# Patient Record
Sex: MALE | Race: BLACK OR AFRICAN AMERICAN | NOT HISPANIC OR LATINO | Employment: OTHER | URBAN - METROPOLITAN AREA
[De-identification: names, ages, dates, MRNs, and addresses within clinical notes are randomized per-mention and may not be internally consistent; named-entity substitution may affect disease eponyms.]

---

## 2017-01-06 DIAGNOSIS — I10 ESSENTIAL HYPERTENSION: ICD-10-CM

## 2017-01-06 RX ORDER — LISINOPRIL AND HYDROCHLOROTHIAZIDE 12.5; 2 MG/1; MG/1
TABLET ORAL
Qty: 90 TABLET | Refills: 11 | Status: SHIPPED | OUTPATIENT
Start: 2017-01-06 | End: 2018-01-10 | Stop reason: SDUPTHER

## 2017-03-16 ENCOUNTER — OFFICE VISIT (OUTPATIENT)
Dept: INTERNAL MEDICINE | Facility: CLINIC | Age: 62
End: 2017-03-16
Payer: OTHER GOVERNMENT

## 2017-03-16 VITALS
OXYGEN SATURATION: 99 % | HEIGHT: 66 IN | SYSTOLIC BLOOD PRESSURE: 134 MMHG | WEIGHT: 152.56 LBS | HEART RATE: 72 BPM | DIASTOLIC BLOOD PRESSURE: 86 MMHG | BODY MASS INDEX: 24.52 KG/M2

## 2017-03-16 DIAGNOSIS — Z00.00 ROUTINE GENERAL MEDICAL EXAMINATION AT A HEALTH CARE FACILITY: Primary | ICD-10-CM

## 2017-03-16 PROCEDURE — 3079F DIAST BP 80-89 MM HG: CPT | Mod: S$GLB,,, | Performed by: INTERNAL MEDICINE

## 2017-03-16 PROCEDURE — 3075F SYST BP GE 130 - 139MM HG: CPT | Mod: S$GLB,,, | Performed by: INTERNAL MEDICINE

## 2017-03-16 PROCEDURE — 99999 PR PBB SHADOW E&M-EST. PATIENT-LVL III: CPT | Mod: PBBFAC,,, | Performed by: INTERNAL MEDICINE

## 2017-03-16 PROCEDURE — 99396 PREV VISIT EST AGE 40-64: CPT | Mod: S$GLB,,, | Performed by: INTERNAL MEDICINE

## 2017-03-16 NOTE — PROGRESS NOTES
Subjective:       Patient ID: Jeremy Martinez is a 62 y.o. male.    Chief Complaint: Annual Exam    HPI Comments: Here for yrly.    Still R shoulder arthritis, occ flares. Exercises from PT help.    No new other symptoms.    Review of Systems   Constitutional: Negative for appetite change and unexpected weight change.   Respiratory: Negative for chest tightness and shortness of breath.    Cardiovascular: Negative for chest pain.   Gastrointestinal: Negative for abdominal pain.   Genitourinary: Negative for difficulty urinating, scrotal swelling and testicular pain.   Musculoskeletal: Positive for arthralgias (R shoulder). Negative for joint swelling.   Skin:        No lesions       Objective:      Physical Exam   Constitutional: He is oriented to person, place, and time. He appears well-developed and well-nourished. No distress.   HENT:   Head: Normocephalic and atraumatic.   Eyes: No scleral icterus.   Neck: Normal range of motion. No thyromegaly present.   Cardiovascular: Normal rate, regular rhythm and normal heart sounds.  Exam reveals no gallop and no friction rub.    No murmur heard.  Pulmonary/Chest: Effort normal and breath sounds normal. No respiratory distress. He has no wheezes. He has no rales.   Abdominal: Soft. Bowel sounds are normal. He exhibits no distension and no mass. There is no tenderness. There is no rebound and no guarding.   Musculoskeletal: Normal range of motion. He exhibits no edema.   R shoulder crepituis and some focal tenderness to deep palpation at AC jt.  No rotator cuff weakness and FAROM     Lymphadenopathy:     He has no cervical adenopathy.   Neurological: He is alert and oriented to person, place, and time.   Skin: No lesion and no rash noted.   Psychiatric: He has a normal mood and affect. Thought content normal.       Assessment:       1. Routine general medical examination at a health care facility        Plan:       Jeremy ARORA was seen today for annual exam.    Diagnoses and  all orders for this visit:    Routine general medical examination at a health care facility  -     CBC auto differential; Future  -     Comprehensive metabolic panel; Future  -     Lipid panel; Future    HTN controlled    Health Maintenance       Date Due Completion Date    TETANUS VACCINE 3/1/1972 ---    Zoster Vaccine 3/1/2014 ---    Influenza Vaccine 8/1/2016 1/26/2016    Colonoscopy 6/4/2017 6/4/2007 (Done)    Override on 6/4/2007: Done    Lipid Panel 3/16/2022 3/16/2017        Next yr colo due.

## 2017-03-16 NOTE — MR AVS SNAPSHOT
[facility]  [address]  [facility] [address]  Phone: [phone]  Fax: [phone]                  Jeremy Martinez   3/16/2017 8:20 AM   Office Visit    Description:  Male : 1955   Provider:  Jose Abbott MD   Department:  [facility]           Reason for Visit     Annual Exam           Diagnoses this Visit        Comments    Routine general medical examination at a health care facility    -  Primary            To Do List           Future Appointments        Provider Department Dept Phone    3/16/2017 9:15 AM LAB, APPOINTMENT [facility] [phone]      Goals (5 Years of Data)     None      Follow-Up and Disposition     Return in about 1 year (around 3/16/2018).      [facility]     [facility] Care Line - 24/ Assistance  Registered nurses in the [facility] provide clinical advisement, health education, appointment booking, and other advisory services.  Call for this free service at [phone].             Medications           Message regarding Medications     Verify the changes and/or additions to your medication regime listed below are the same as discussed with your clinician today.  If any of these changes or additions are incorrect, please notify your healthcare provider.        STOP taking these medications     omeprazole (PRILOSEC) 20 MG capsule Take 20 mg by mouth daily as needed.    orphenadrine (NORFLEX) 100 mg tablet     psyllium seed, sugar, (METAMUCIL) Powd Take by mouth. 1 tblspf po in glass of water daily.    cetirizine (ZYRTEC) 10 MG tablet Take 1 tablet (10 mg total) by mouth once daily.           Verify that the below list of medications is an accurate representation of the medications you are currently taking.  If none reported, the list may be blank. If incorrect, please contact your healthcare provider. Carry this list with you in case of emergency.           Current  "Medications     chlorhexidine (PERIDEX) 0.12 % solution Use as directed 1 mL in the mouth or throat once daily.    lisinopril-hydrochlorothiazide (PRINZIDE,ZESTORETIC) 20-12.5 mg per tablet TAKE ONE TABLET BY MOUTH ONCE DAILY    VIAGRA 100 mg tablet TAKE ONE-HALF TABLET BY MOUTH ONCE DAILY AS NEEDED FOR ERECTILE DYSFUNCTION           Clinical Reference Information           Your Vitals Were     BP Pulse Height Weight SpO2 BMI    134/86 72 5' 6" (1.676 m) 69.2 kg (152 lb 8.9 oz) 99% 24.62 kg/m2      Blood Pressure          Most Recent Value    BP  134/86      Allergies as of 3/16/2017     Amoxicillin      Immunizations Administered on Date of Encounter - 3/16/2017     None      Orders Placed During Today's Visit     Future Labs/Procedures Expected by Expires    CBC auto differential  3/16/2017 6/14/2017    Comprehensive metabolic panel  3/16/2017 6/14/2017    Lipid panel  3/17/2018 (Approximate) 5/16/2018      [facility] Sign-Up     Activating your [facility] account is as easy as 1-2-3!     1) Visit [email or web address], select Sign Up Now, enter this activation code and your date of birth, then select Next.  [email or web address]  Expires: 4/30/2017  8:59 AM      2) Create a username and password to use when you visit [facility] in the future and select a security question in case you lose your password and select Next.    3) Enter your e-mail address and click Sign Up!    Additional Information  If you have questions, please e-mail [email or web address] or call [phone] to talk to our [facility] staff. Remember, [facility] is NOT to be used for urgent needs. For medical emergencies, dial 911.         Language Assistance Services     ATTENTION: Language assistance services are available, free of charge. Please call [phone].      ATENCIÓN: Si habla español, tiene a schaeffer disposición servicios gratuitos de asistencia lingüística. Llame al [phone].     CHÚ Ý: N?u b?n nói Ti?ng Vi?t, có các d?ch v? h? tr? ngôn ng? mi?n " phí dành cho b?n. G?i s? [phone].         [facility] complies with applicable Federal civil rights laws and does not discriminate on the basis of race, color, national origin, age, disability, or sex.

## 2017-06-21 DIAGNOSIS — Z12.11 SPECIAL SCREENING FOR MALIGNANT NEOPLASMS, COLON: Primary | ICD-10-CM

## 2017-06-21 RX ORDER — POLYETHYLENE GLYCOL 3350, SODIUM SULFATE ANHYDROUS, SODIUM BICARBONATE, SODIUM CHLORIDE, POTASSIUM CHLORIDE 236; 22.74; 6.74; 5.86; 2.97 G/4L; G/4L; G/4L; G/4L; G/4L
4 POWDER, FOR SOLUTION ORAL ONCE
Qty: 4000 ML | Refills: 0 | Status: SHIPPED | OUTPATIENT
Start: 2017-06-21 | End: 2017-06-21

## 2017-08-01 ENCOUNTER — HOSPITAL ENCOUNTER (OUTPATIENT)
Facility: HOSPITAL | Age: 62
Discharge: HOME | End: 2017-08-01
Attending: COLON & RECTAL SURGERY | Admitting: COLON & RECTAL SURGERY
Payer: OTHER GOVERNMENT

## 2017-08-01 ENCOUNTER — ANESTHESIA EVENT (OUTPATIENT)
Dept: ENDOSCOPY | Facility: HOSPITAL | Age: 62
End: 2017-08-01
Payer: OTHER GOVERNMENT

## 2017-08-01 ENCOUNTER — ANESTHESIA (OUTPATIENT)
Dept: ENDOSCOPY | Facility: HOSPITAL | Age: 62
End: 2017-08-01
Payer: OTHER GOVERNMENT

## 2017-08-01 VITALS
WEIGHT: 160 LBS | BODY MASS INDEX: 25.71 KG/M2 | TEMPERATURE: 98 F | SYSTOLIC BLOOD PRESSURE: 125 MMHG | OXYGEN SATURATION: 98 % | HEIGHT: 66 IN | RESPIRATION RATE: 14 BRPM | RESPIRATION RATE: 18 BRPM | HEART RATE: 60 BPM | DIASTOLIC BLOOD PRESSURE: 85 MMHG

## 2017-08-01 DIAGNOSIS — Z12.11 SCREENING FOR COLON CANCER: ICD-10-CM

## 2017-08-01 PROCEDURE — 63600175 PHARM REV CODE 636 W HCPCS: Performed by: NURSE ANESTHETIST, CERTIFIED REGISTERED

## 2017-08-01 PROCEDURE — D9220A PRA ANESTHESIA: Mod: 33,,, | Performed by: ANESTHESIOLOGY

## 2017-08-01 PROCEDURE — G0121 COLON CA SCRN NOT HI RSK IND: HCPCS | Mod: ,,, | Performed by: COLON & RECTAL SURGERY

## 2017-08-01 PROCEDURE — 37000009 HC ANESTHESIA EA ADD 15 MINS: Performed by: COLON & RECTAL SURGERY

## 2017-08-01 PROCEDURE — G0121 COLON CA SCRN NOT HI RSK IND: HCPCS | Performed by: COLON & RECTAL SURGERY

## 2017-08-01 PROCEDURE — 37000008 HC ANESTHESIA 1ST 15 MINUTES: Performed by: COLON & RECTAL SURGERY

## 2017-08-01 PROCEDURE — 25000003 PHARM REV CODE 250: Performed by: NURSE PRACTITIONER

## 2017-08-01 RX ORDER — PROPOFOL 10 MG/ML
VIAL (ML) INTRAVENOUS CONTINUOUS PRN
Status: DISCONTINUED | OUTPATIENT
Start: 2017-08-01 | End: 2017-08-01

## 2017-08-01 RX ORDER — PROPOFOL 10 MG/ML
VIAL (ML) INTRAVENOUS
Status: DISCONTINUED | OUTPATIENT
Start: 2017-08-01 | End: 2017-08-01

## 2017-08-01 RX ORDER — LIDOCAINE HCL/PF 100 MG/5ML
SYRINGE (ML) INTRAVENOUS
Status: DISCONTINUED | OUTPATIENT
Start: 2017-08-01 | End: 2017-08-01

## 2017-08-01 RX ORDER — SODIUM CHLORIDE 9 MG/ML
INJECTION, SOLUTION INTRAVENOUS CONTINUOUS
Status: DISCONTINUED | OUTPATIENT
Start: 2017-08-01 | End: 2017-08-01 | Stop reason: HOSPADM

## 2017-08-01 RX ADMIN — PROPOFOL 60 MG: 10 INJECTION, EMULSION INTRAVENOUS at 08:08

## 2017-08-01 RX ADMIN — PROPOFOL 200 MCG/KG/MIN: 10 INJECTION, EMULSION INTRAVENOUS at 08:08

## 2017-08-01 RX ADMIN — LIDOCAINE HYDROCHLORIDE 50 MG: 20 INJECTION, SOLUTION INTRAVENOUS at 08:08

## 2017-08-01 RX ADMIN — SODIUM CHLORIDE: 0.9 INJECTION, SOLUTION INTRAVENOUS at 07:08

## 2017-08-01 NOTE — PATIENT INSTRUCTIONS
Discharge Summary/Instructions after an Endoscopic Procedure  Patient Name: Jeremy Martinez  Patient MRN: [id number]  Patient YOB: 1955  Tuesday, August 01, 2017  Abilio Restrepo MD  RESTRICTIONS ON ACTIVITY:  - DO NOT drive a car, operate machinery, make legal/financial decisions, or   drink alcohol until the day after the procedure.    - The following day: return to full activity including work, except no heavy   lifting, straining or running for 3 days if polyps were removed.  - Diet: Eat and drink normally unless instructed otherwise.  TREATMENT FOR COMMON SIDE EFFECTS:  - Mild abdominal pain, bloating or excessive gas: rest, eat lightly and use   a heating pad.  - Sore Throat - treat with throat lozenges. Gargle with warm salt water.  SYMPTOMS TO WATCH FOR AND REPORT TO YOUR PHYSICIAN:  1. Severe abdominal pain or bloating.  2. Pain in chest.  3. Chills or fever occurring within 24 hours after a procedure.  4. A large amount of rectal bleeding, which would show as bright red,   maroon, or black stools. (A small amount of blood from the rectum is not   serious, especially if hemorrhoids are present.)  5. Because air was used during the procedure, expelling large amounts of air   from your rectum or belching is normal.  6. If a bowel prep was taken, you may not have a bowel movement for 1-3   days.  This is normal.  7. Go directly to the emergency room if you notice any of the following:   Chills and/or fever over 101 F   Persistent vomiting   Severe abdominal pain, other than gas cramps   Severe chest pain   Black, tarry stools   Any bleeding - exceeding one tablespoon  Your doctor recommends these additional instructions:  If any biopsies were performed, my office will call you in 5 to 6 business   days with any results.  You are being discharged to home.   You have a contact number available for emergencies.  The signs and symptoms   of potential delayed complications were discussed with you.  You may  return   to normal activities tomorrow.  Written discharge instructions were   provided to you.   Eat a high fiber diet for the rest of your life.   Continue your present medications.   Your physician has recommended a repeat colonoscopy in 10 years for   screening purposes.  For questions, problems or results please call your physician - Abilio Restrepo MD at Work:  [phone].  [facility], EMERGENCY ROOM PHONE NUMBER: [phone]  IF A COMPLICATION OR EMERGENCY SITUATION ARISES AND YOU ARE UNABLE TO REACH   YOUR PHYSICIAN - GO TO THE EMERGENCY ROOM.  Abilio Restrepo MD  8/1/2017 9:03:27 AM  This report has been verified and signed electronically.

## 2017-08-01 NOTE — TRANSFER OF CARE
"Anesthesia Transfer of Care Note    Patient: Jeremy Martinez    Procedure(s) Performed: Procedure(s) (LRB):  COLONOSCOPY (N/A)    Patient location: PACU    Transport from OR: Transported from OR on room air with adequate spontaneous ventilation    Post pain: adequate analgesia    Post assessment: no apparent anesthetic complications    Post vital signs: stable    Level of consciousness: awake    Nausea/Vomiting: no nausea/vomiting    Complications: none    Transfer of care protocol was followed      Last vitals:   Visit Vitals  /64 (BP Location: Left arm, Patient Position: Lying, BP Method: Automatic)   Pulse 73   Temp 36.8 °C (98.2 °F) (Oral)   Resp 16   Ht 5' 6" (1.676 m)   Wt 72.6 kg (160 lb)   SpO2 100%   BMI 25.82 kg/m²     "

## 2017-08-01 NOTE — ANESTHESIA PREPROCEDURE EVALUATION
08/01/2017  Jeremy Martinez is a 62 y.o., male.    Anesthesia Evaluation         Review of Systems  Social:  Former Smoker, Alcohol Use   Cardiovascular:   Hypertension hyperlipidemia    Hepatic/GI:   GERD        Physical Exam  General:  Well nourished    Airway/Jaw/Neck:  Airway Findings: Mouth Opening: Normal Tongue: Normal  General Airway Assessment: Adult  Mallampati: II      Dental:  Dental Findings: Upper Dentures        Mental Status:  Mental Status Findings:  Cooperative, Alert and Oriented         Anesthesia Plan  Type of Anesthesia, risks & benefits discussed:  Anesthesia Type:  general  Patient's Preference:   Intra-op Monitoring Plan: standard ASA monitors  Intra-op Monitoring Plan Comments:   Post Op Pain Control Plan:   Post Op Pain Control Plan Comments:   Induction:    Beta Blocker:  Patient is not currently on a Beta-Blocker (No further documentation required).       Informed Consent: Patient understands risks and agrees with Anesthesia plan.  Questions answered. Anesthesia consent signed with patient.  ASA Score: 2     Day of Surgery Review of History & Physical:    H&P update referred to the provider.         Ready For Surgery From Anesthesia Perspective.

## 2017-08-01 NOTE — ANESTHESIA POSTPROCEDURE EVALUATION
"Anesthesia Post Evaluation    Patient: Jeremy Martinez    Procedure(s) Performed: Procedure(s) (LRB):  COLONOSCOPY (N/A)    Final Anesthesia Type: general  Patient location during evaluation: GI PACU  Patient participation: Yes- Able to Participate  Level of consciousness: awake and alert  Post-procedure vital signs: reviewed and stable  Pain management: adequate  Airway patency: patent  PONV status at discharge: No PONV  Anesthetic complications: no      Cardiovascular status: blood pressure returned to baseline  Respiratory status: unassisted  Hydration status: euvolemic  Follow-up not needed.        Visit Vitals  /85 (BP Location: Left arm, Patient Position: Sitting, BP Method: Automatic)   Pulse 60   Temp 36.8 °C (98.2 °F) (Oral)   Resp 18   Ht 5' 6" (1.676 m)   Wt 72.6 kg (160 lb)   SpO2 98%   BMI 25.82 kg/m²       Pain/Mike Score: Pain Assessment Performed: Yes (8/1/2017  9:36 AM)  Presence of Pain: denies (8/1/2017  9:36 AM)  Mike Score: 9 (8/1/2017  9:06 AM)      "

## 2017-08-01 NOTE — H&P
Colonoscopy History and Physical      Procedure : Colonoscopy    Indications:  asymptomatic screening exam    Family Hx of CRC: none    Last Colonoscopy:  10    Hx of sedation problems: none  FHX of sedation problems: none    Past Medical History:   Diagnosis Date    Erectile dysfunction     GERD (gastroesophageal reflux disease) 2010    Hyperlipidemia 2008    Hypertension 2007       Past Surgical History:   Procedure Laterality Date    TOTAL KNEE ARTHROPLASTY      right       Review of patient's allergies indicates:   Allergen Reactions    Amoxicillin Rash       No current facility-administered medications on file prior to encounter.      Current Outpatient Prescriptions on File Prior to Encounter   Medication Sig Dispense Refill    lisinopril-hydrochlorothiazide (PRINZIDE,ZESTORETIC) 20-12.5 mg per tablet TAKE ONE TABLET BY MOUTH ONCE DAILY 90 tablet 11    chlorhexidine (PERIDEX) 0.12 % solution Use as directed 1 mL in the mouth or throat once daily.      VIAGRA 100 mg tablet TAKE ONE-HALF TABLET BY MOUTH ONCE DAILY AS NEEDED FOR ERECTILE DYSFUNCTION 12 tablet 11       Family History   Problem Relation Age of Onset    Diabetes Mother     Kidney disease Son      transplant    Cancer Neg Hx     Colon cancer Neg Hx     Colon polyps Neg Hx     Esophageal cancer Neg Hx     Stomach cancer Neg Hx     Rectal cancer Neg Hx     Ulcerative colitis Neg Hx     Crohn's disease Neg Hx        Social History     Social History    Marital status:      Spouse name: N/A    Number of children: N/A    Years of education: N/A     Occupational History    Not on file.     Social History Main Topics    Smoking status: Former Smoker     Years: 10.00     Quit date: 7/18/2000    Smokeless tobacco: Never Used    Alcohol use Yes      Comment: a few beers every days, shots on weekends    Drug use: No    Sexual activity: Not on file     Other Topics Concern    Not on file     Social History Narrative     , 5 kids. 4 grandkids. 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    Active at work. No formal exercise.       Review of Systems -   Respiratory ROS: negative  Cardiovascular ROS: negative  Gastrointestinal ROS: negative  Musculoskeletal ROS: negative  Neurological ROS: negative    Physical Exam:  General: no distress  Head: normocephalic  Oropharynx clear, Mallampati 1  Lungs:  normal respiratory effort  Heart: regular rate  Abdomen: soft,  Non-tender  Extremities: warm and well perfused  Neuro awake and alert    ASA: II    Patient cleared for Anesthesia:  MAC    Anesthesia/Surgery risks, benefits, and alternative options discussed and understood by patient/family.

## 2017-08-08 ENCOUNTER — TELEPHONE (OUTPATIENT)
Dept: ENDOSCOPY | Facility: HOSPITAL | Age: 62
End: 2017-08-08

## 2018-01-10 ENCOUNTER — TELEPHONE (OUTPATIENT)
Dept: INTERNAL MEDICINE | Facility: CLINIC | Age: 63
End: 2018-01-10

## 2018-01-10 DIAGNOSIS — Z00.00 ROUTINE GENERAL MEDICAL EXAMINATION AT A HEALTH CARE FACILITY: Primary | ICD-10-CM

## 2018-01-10 DIAGNOSIS — I10 ESSENTIAL HYPERTENSION: ICD-10-CM

## 2018-01-10 RX ORDER — LISINOPRIL AND HYDROCHLOROTHIAZIDE 12.5; 2 MG/1; MG/1
1 TABLET ORAL DAILY
Qty: 90 TABLET | Refills: 3 | Status: SHIPPED | OUTPATIENT
Start: 2018-01-10 | End: 2019-01-07 | Stop reason: SDUPTHER

## 2018-01-10 NOTE — TELEPHONE ENCOUNTER
----- Message from Sara Maddox sent at 1/10/2018 10:08 AM CST -----  Contact: self/ [phone]  Doctor appointment and lab have been scheduled.  Please link lab orders to the lab appointment.  Date of doctor appointment:  3/16/18  Physical or EP:  phys  Date of lab appointment:    Comments:Patient need orders to go to [facility] in March.

## 2018-01-10 NOTE — TELEPHONE ENCOUNTER
"----- Message from Sara Maddox sent at 1/10/2018 10:03 AM CST -----  Contact: self/ [phone]  RX request - refill or new RX.  Is this a refill or new RX:    RX name and strength: lisinopril-hydrochlorothiazide (PRINZIDE,ZESTORETIC) 20-12.5 mg per tablet 90 tablet   Directions:   Is this a 30 day or 90 day RX:    Pharmacy name and phone # (DON'T enter "on file" or "in chart"): [facility] [address] [place] [address] (N), [place] - [address] [phone] (Phone)  [phone] (Fax)      Comments:        "

## 2018-03-09 LAB
ALBUMIN SERPL-MCNC: 4.4 G/DL (ref 3.6–5.1)
ALBUMIN/GLOB SERPL: 1.4 (CALC) (ref 1–2.5)
ALP SERPL-CCNC: 62 U/L (ref 40–115)
ALT SERPL-CCNC: 14 U/L (ref 9–46)
AST SERPL-CCNC: 17 U/L (ref 10–35)
BASOPHILS # BLD AUTO: 21 CELLS/UL (ref 0–200)
BASOPHILS NFR BLD AUTO: 0.4 %
BILIRUB SERPL-MCNC: 0.8 MG/DL (ref 0.2–1.2)
BUN SERPL-MCNC: 9 MG/DL (ref 7–25)
BUN/CREAT SERPL: ABNORMAL (CALC) (ref 6–22)
CALCIUM SERPL-MCNC: 9.2 MG/DL (ref 8.6–10.3)
CHLORIDE SERPL-SCNC: 95 MMOL/L (ref 98–110)
CHOLEST SERPL-MCNC: 217 MG/DL
CHOLEST/HDLC SERPL: 3.4 (CALC)
CO2 SERPL-SCNC: 26 MMOL/L (ref 20–31)
CREAT SERPL-MCNC: 0.96 MG/DL (ref 0.7–1.25)
EOSINOPHIL # BLD AUTO: 80 CELLS/UL (ref 15–500)
EOSINOPHIL NFR BLD AUTO: 1.5 %
ERYTHROCYTE [DISTWIDTH] IN BLOOD BY AUTOMATED COUNT: 13.9 % (ref 11–15)
GFR SERPL CREATININE-BSD FRML MDRD: 83 ML/MIN/1.73M2
GLOBULIN SER CALC-MCNC: 3.2 G/DL (CALC) (ref 1.9–3.7)
GLUCOSE SERPL-MCNC: 91 MG/DL (ref 65–99)
HCT VFR BLD AUTO: 39.2 % (ref 38.5–50)
HDLC SERPL-MCNC: 63 MG/DL
HGB BLD-MCNC: 12.4 G/DL (ref 13.2–17.1)
LDLC SERPL CALC-MCNC: 129 MG/DL (CALC)
LYMPHOCYTES # BLD AUTO: 2300 CELLS/UL (ref 850–3900)
LYMPHOCYTES NFR BLD AUTO: 43.4 %
MCH RBC QN AUTO: 24.4 PG (ref 27–33)
MCHC RBC AUTO-ENTMCNC: 31.6 G/DL (ref 32–36)
MCV RBC AUTO: 77 FL (ref 80–100)
MONOCYTES # BLD AUTO: 610 CELLS/UL (ref 200–950)
MONOCYTES NFR BLD AUTO: 11.5 %
NEUTROPHILS # BLD AUTO: 2290 CELLS/UL (ref 1500–7800)
NEUTROPHILS NFR BLD AUTO: 43.2 %
NONHDLC SERPL-MCNC: 154 MG/DL (CALC)
PLATELET # BLD AUTO: 295 THOUSAND/UL (ref 140–400)
PMV BLD REES-ECKER: 9.7 FL (ref 7.5–12.5)
POTASSIUM SERPL-SCNC: 4.3 MMOL/L (ref 3.5–5.3)
PROT SERPL-MCNC: 7.6 G/DL (ref 6.1–8.1)
RBC # BLD AUTO: 5.09 MILLION/UL (ref 4.2–5.8)
SODIUM SERPL-SCNC: 128 MMOL/L (ref 135–146)
TRIGL SERPL-MCNC: 142 MG/DL
WBC # BLD AUTO: 5.3 THOUSAND/UL (ref 3.8–10.8)

## 2018-03-16 ENCOUNTER — OFFICE VISIT (OUTPATIENT)
Dept: INTERNAL MEDICINE | Facility: CLINIC | Age: 63
End: 2018-03-16
Payer: OTHER GOVERNMENT

## 2018-03-16 VITALS
BODY MASS INDEX: 26.4 KG/M2 | HEART RATE: 75 BPM | DIASTOLIC BLOOD PRESSURE: 76 MMHG | OXYGEN SATURATION: 99 % | WEIGHT: 164.25 LBS | SYSTOLIC BLOOD PRESSURE: 130 MMHG | HEIGHT: 66 IN

## 2018-03-16 DIAGNOSIS — E78.5 HYPERLIPIDEMIA, UNSPECIFIED HYPERLIPIDEMIA TYPE: ICD-10-CM

## 2018-03-16 DIAGNOSIS — I10 ESSENTIAL HYPERTENSION: ICD-10-CM

## 2018-03-16 DIAGNOSIS — Z00.00 ROUTINE GENERAL MEDICAL EXAMINATION AT A HEALTH CARE FACILITY: Primary | ICD-10-CM

## 2018-03-16 PROCEDURE — 99999 PR PBB SHADOW E&M-EST. PATIENT-LVL III: CPT | Mod: PBBFAC,,, | Performed by: INTERNAL MEDICINE

## 2018-03-16 PROCEDURE — 99396 PREV VISIT EST AGE 40-64: CPT | Mod: S$GLB,,, | Performed by: INTERNAL MEDICINE

## 2018-03-16 RX ORDER — ATORVASTATIN CALCIUM 10 MG/1
10 TABLET, FILM COATED ORAL DAILY
Qty: 90 TABLET | Refills: 3 | Status: SHIPPED | OUTPATIENT
Start: 2018-03-16 | End: 2019-03-18

## 2018-03-16 NOTE — PROGRESS NOTES
Subjective:       Patient ID: Jeremy Martinez is a 63 y.o. male.    Chief Complaint: Annual Exam    Here for annual exam.    L knee stiff and a bit swollen.    No new other issues.      Review of Systems   Constitutional: Negative for appetite change and unexpected weight change.   Respiratory: Negative for chest tightness and shortness of breath.    Cardiovascular: Negative for chest pain.   Gastrointestinal: Negative for abdominal pain.   Genitourinary: Negative for difficulty urinating, scrotal swelling and testicular pain.   Musculoskeletal: Positive for arthralgias (knees). Negative for joint swelling.   Skin:        No lesions       Objective:      Physical Exam   Constitutional: He is oriented to person, place, and time. He appears well-developed and well-nourished. No distress.   HENT:   Head: Normocephalic and atraumatic.   Eyes: No scleral icterus.   Neck: Normal range of motion. No thyromegaly present.   Cardiovascular: Normal rate, regular rhythm and normal heart sounds.  Exam reveals no gallop and no friction rub.    No murmur heard.  Pulmonary/Chest: Effort normal and breath sounds normal. No respiratory distress. He has no wheezes. He has no rales.   Abdominal: Soft. Bowel sounds are normal. He exhibits no distension and no mass. There is no tenderness. There is no rebound and no guarding.   Musculoskeletal: Normal range of motion. He exhibits no edema.   L knee patella somewhat hypermobile, maintained rom L knee. Knee not warm or swollen.     Lymphadenopathy:     He has no cervical adenopathy.   Neurological: He is alert and oriented to person, place, and time.   Skin: No lesion and no rash noted.   Psychiatric: He has a normal mood and affect. Thought content normal.       Assessment:       1. Essential hypertension    2. Hyperlipidemia, unspecified hyperlipidemia type        Plan:         Jeremy ARORA was seen today for annual exam.    Diagnoses and all orders for this visit:    Essential  hypertension  controlled    Hyperlipidemia, unspecified hyperlipidemia type  -     atorvastatin (LIPITOR) 10 MG tablet; Take 1 tablet (10 mg total) by mouth once daily.    Lengthy discussion re importance of cutting back etoh.    Health Maintenance       Date Due Completion Date    TETANUS VACCINE 03/01/1972 ---    Zoster Vaccine 03/01/2014 ---    Influenza Vaccine 08/01/2017 1/26/2016    Lipid Panel 03/08/2023 3/8/2018    Colonoscopy 08/01/2027 8/1/2017    Override on 6/4/2007: Done          Follow-up in about 1 year (around 3/16/2019).            The 10-year ASCVD risk score (Wading Rivernova ROSS Jr., et al., 2013) is: 15.3%    Values used to calculate the score:      Age: 63 years      Sex: Male      Is Non- : Yes      Diabetic: No      Tobacco smoker: No      Systolic Blood Pressure: 130 mmHg      Is BP treated: Yes      HDL Cholesterol: 63 mg/dL      Total Cholesterol: 217 mg/dL

## 2018-12-27 ENCOUNTER — TELEPHONE (OUTPATIENT)
Dept: INTERNAL MEDICINE | Facility: CLINIC | Age: 63
End: 2018-12-27

## 2018-12-27 DIAGNOSIS — Z00.00 ROUTINE GENERAL MEDICAL EXAMINATION AT A HEALTH CARE FACILITY: Primary | ICD-10-CM

## 2018-12-27 NOTE — TELEPHONE ENCOUNTER
Hi, please contact the patient to assist in scheduling    Orders Placed This Encounter    CBC auto differential    Comprehensive metabolic panel    Lipid panel    Hemoglobin A1c       Thank you, Jose Abbott

## 2018-12-27 NOTE — TELEPHONE ENCOUNTER
----- Message from Dionna Milian sent at 12/27/2018 10:10 AM CST -----  Contact: Pt Mobile [phone]   Doctor appointment and lab have been scheduled.  Please link lab orders to the lab appointment.  Date of doctor appointment:  03/18/2019  Physical or EP:  Physical   Date of lab appointment:    Comments: Patient would like to put in an order for his labs to be done at [facility] please.

## 2019-01-07 DIAGNOSIS — I10 ESSENTIAL HYPERTENSION: ICD-10-CM

## 2019-01-07 RX ORDER — LISINOPRIL AND HYDROCHLOROTHIAZIDE 12.5; 2 MG/1; MG/1
TABLET ORAL
Qty: 90 TABLET | Refills: 3 | Status: SHIPPED | OUTPATIENT
Start: 2019-01-07 | End: 2019-01-10 | Stop reason: SDUPTHER

## 2019-01-10 DIAGNOSIS — I10 ESSENTIAL HYPERTENSION: ICD-10-CM

## 2019-01-10 RX ORDER — LISINOPRIL AND HYDROCHLOROTHIAZIDE 12.5; 2 MG/1; MG/1
1 TABLET ORAL DAILY
Qty: 90 TABLET | Refills: 3 | Status: SHIPPED | OUTPATIENT
Start: 2019-01-10 | End: 2020-01-08

## 2019-01-10 NOTE — TELEPHONE ENCOUNTER
----- Message from Matilda Nicole sent at 1/10/2019  1:41 PM CST -----  Contact: Jeremy Martinez [phone]  The patient is requesting a refill.    lisinopril-hydrochlorothiazide (PRINZIDE,ZESTORETIC) 20-12.5 mg per tablet    Pharmacy:[address] (), [place] - [address] [phone] (Phone)  [phone] (Fax)

## 2019-01-15 ENCOUNTER — OFFICE VISIT (OUTPATIENT)
Dept: INTERNAL MEDICINE | Facility: CLINIC | Age: 64
End: 2019-01-15
Payer: OTHER GOVERNMENT

## 2019-01-15 VITALS
SYSTOLIC BLOOD PRESSURE: 138 MMHG | HEART RATE: 76 BPM | BODY MASS INDEX: 26.65 KG/M2 | WEIGHT: 165.81 LBS | HEIGHT: 66 IN | OXYGEN SATURATION: 99 % | DIASTOLIC BLOOD PRESSURE: 80 MMHG

## 2019-01-15 DIAGNOSIS — R21 RASH: Primary | ICD-10-CM

## 2019-01-15 DIAGNOSIS — B35.9 TINEA: ICD-10-CM

## 2019-01-15 PROCEDURE — 99999 PR PBB SHADOW E&M-EST. PATIENT-LVL IV: ICD-10-PCS | Mod: PBBFAC,,, | Performed by: PHYSICIAN ASSISTANT

## 2019-01-15 PROCEDURE — 99213 PR OFFICE/OUTPT VISIT, EST, LEVL III, 20-29 MIN: ICD-10-PCS | Mod: S$GLB,,, | Performed by: PHYSICIAN ASSISTANT

## 2019-01-15 PROCEDURE — 99999 PR PBB SHADOW E&M-EST. PATIENT-LVL IV: CPT | Mod: PBBFAC,,, | Performed by: PHYSICIAN ASSISTANT

## 2019-01-15 PROCEDURE — 99213 OFFICE O/P EST LOW 20 MIN: CPT | Mod: S$GLB,,, | Performed by: PHYSICIAN ASSISTANT

## 2019-01-15 RX ORDER — HYDROXYZINE HYDROCHLORIDE 25 MG/1
25 TABLET, FILM COATED ORAL 3 TIMES DAILY
Qty: 30 TABLET | Refills: 0 | Status: SHIPPED | OUTPATIENT
Start: 2019-01-15 | End: 2019-03-18

## 2019-01-15 RX ORDER — KETOCONAZOLE 20 MG/G
CREAM TOPICAL 2 TIMES DAILY
Qty: 30 G | Refills: 0 | Status: SHIPPED | OUTPATIENT
Start: 2019-01-15 | End: 2019-01-23 | Stop reason: SDUPTHER

## 2019-01-15 NOTE — PROGRESS NOTES
"Subjective:       Patient ID: Jeremy Martinez is a 63 y.o. male.    Chief Complaint: Rash    HPI     C/o rash under both arms, onset about 1 month ago.   Using alcohol rub, apple cider water without significant relief. He notes he does sweat heavily in this region. No recent change of deodorants. States his wife often changes laundry detergent. No rash on other parts of body. No new medications, No fevers, arthralgias or drainage.     Review of patient's allergies indicates:   Allergen Reactions    Amoxicillin Hives and Rash     Social History     Tobacco Use    Smoking status: Former Smoker     Years: 10.00     Last attempt to quit: 2000     Years since quittin.5    Smokeless tobacco: Never Used   Substance Use Topics    Alcohol use: Yes     Comment: a few beers daily, some days more. not drinking 6 pack.    Drug use: No     Past Medical History:   Diagnosis Date    Erectile dysfunction     GERD (gastroesophageal reflux disease)     Hyperlipidemia 2008    Hypertension 2007           Review of Systems   Constitutional: Negative for chills, diaphoresis, fatigue, fever and unexpected weight change.   Eyes: Negative for visual disturbance.   Respiratory: Negative for cough and shortness of breath.    Cardiovascular: Negative for chest pain and leg swelling.   Gastrointestinal: Negative for abdominal pain, nausea and vomiting.   Musculoskeletal: Negative for arthralgias.   Skin: Positive for rash.   Neurological: Negative for weakness and headaches.   Hematological: Negative for adenopathy.       Objective: /80   Pulse 76   Ht 5' 6" (1.676 m)   Wt 75.2 kg (165 lb 12.6 oz)   SpO2 99%   BMI 26.76 kg/m²         Physical Exam   Constitutional: He is oriented to person, place, and time. He appears well-developed and well-nourished. No distress.   HENT:   Head: Normocephalic and atraumatic.   Mouth/Throat: Oropharynx is clear and moist.   Cardiovascular: Normal rate and regular rhythm. Exam " reveals no friction rub.   No murmur heard.  Pulmonary/Chest: Effort normal and breath sounds normal. He has no wheezes. He has no rales.   Abdominal: Soft. Bowel sounds are normal. There is no tenderness.   Lymphadenopathy:     He has no cervical adenopathy.   Neurological: He is alert and oriented to person, place, and time.   Skin: Skin is warm and dry. Capillary refill takes less than 2 seconds. Rash noted.   See photos below  Large patch to bilateral axilla, well demarcated, mild eyrthematous border with central clearing.    Psychiatric: He has a normal mood and affect.   Vitals reviewed.                    Assessment:       1. Rash    2. Tinea        Plan:         Jeremy ARORA was seen today for rash.    Diagnoses and all orders for this visit:    Rash  -     hydrOXYzine HCl (ATARAX) 25 MG tablet; Take 1 tablet (25 mg total) by mouth 3 (three) times daily.  -     ketoconazole (NIZORAL) 2 % cream; Apply topically 2 (two) times daily. For 2 weeks.    Tinea  -     hydrOXYzine HCl (ATARAX) 25 MG tablet; Take 1 tablet (25 mg total) by mouth 3 (three) times daily.  -     ketoconazole (NIZORAL) 2 % cream; Apply topically 2 (two) times daily. For 2 weeks.      Suspect fungal rash of axilla  Trial of topical antifungal  Keep area clean and dry  RTC if symptoms worsen or fail to improve  Consider Derm referral if no resolution  Keep PCP follow up.     Future Appointments   Date Time Provider Department Center   3/18/2019  8:00 AM Jose Abbott MD [facility]

## 2019-01-15 NOTE — PATIENT INSTRUCTIONS
Fungal Skin Infection (Tinea) (Child)  A fungal infection happens when too much fungus grows on or in the body. Fungus normally lives on the skin in small amounts and does not cause harm. But when too much grows on the skin, it causes an infection. This is also known as tinea. Fungal skin infections are common in children and usually not serious.  The infection often starts as a small red area the size of a pea. The skin may turn dry and flaky. The area may itch. As the fungus grows, it spreads out in a red [place]. Because of how it looks, fungal skin infection is often called ringworm, but it is not caused by a worm. Fungal skin infections can occur on many parts of the body. They can grow on the head, chest, arms, or legs. They can occur on the buttocks. On the feet, fungal infection is known as athletes foot. It causes itchy, sometimes painful sores between the toes and on the bottom or sides of the feet.  In babies and children, a fungal skin infection is often caused by contact with a person or animal that is infected. A child who has been on antibiotics can get the infection more easily. A child with a weakened immune system can also get fungal infections more easily. Children who have diabetes or are obese also are more likely to get a fungal infection.   In most cases, treatment is done with antifungal cream or ointment. If the infection is on your childs scalp, oral medicine may be given. In some cases, a tiny piece of the skin may be taken. This is so it can be tested in a lab.  Home care  Follow all instructions when using antifungal cream or ointment on your child. For diaper areas, the healthcare provider may advise using cornstarch powder to keep the skin dry or petroleum jelly to provide a barrier. Dont use talcum powder. It can harm the lungs.  General care  · Expose the affected skin to the air so that it dries completely. Don't use a hair dryer on the skin. Carefully dry the feet and between  the toes after bathing.  · Dress your child in loose-fitting cotton clothing.  · Make sure your child does not scratch the affected area. This can delay healing and may spread the infection. It can also cause a bacterial infection. You may need to use scratch mittens that cover your childs hands.  · Keep your childs skin clean, but dont wash the skin too much. This can irritate the skin.  For children in diapers:  · Keep your childs skin dry by changing wet or soiled diapers right away.  · Use cold cream on a cotton ball to wipe urine off the skin. Use warm water and a mild soap to clean stool off the skin.  · Use mineral oil on a cotton ball to gently remove soiled ointment. Keep clean ointment on the skin. Apply more ointment after each diaper change.  · Use superabsorbent disposable diapers to help keep your child's skin dry. If you use cloth diapers, use overwraps that breathe. Don't use rubber pants over the diaper.  Follow-up care  Follow up with your childs healthcare provider, or as advised.  Special note to parents  Wash your hands well with soap and warm water before and after caring for your child. This is to help avoid spreading the infection.  When to seek medical advice  Call your child's healthcare provider right away if any of these occur:  · Fever of 100.4°F (38°C) or higher, or as directed by your child's healthcare provider  · Redness or swelling that gets worse  · Pain that gets worse  · Foul-smelling fluid leaking from the skin  Date Last Reviewed: 1/1/2017 © 2000-2017 [employer]. [address] [place] [address], [place] [address]. All rights reserved. This information is not intended as a substitute for professional medical care. Always follow your healthcare professional's instructions.

## 2019-01-23 DIAGNOSIS — R21 RASH: ICD-10-CM

## 2019-01-23 DIAGNOSIS — B35.9 TINEA: ICD-10-CM

## 2019-01-23 RX ORDER — KETOCONAZOLE 20 MG/G
CREAM TOPICAL 2 TIMES DAILY
Qty: 30 G | Refills: 11 | Status: SHIPPED | OUTPATIENT
Start: 2019-01-23 | End: 2020-01-13

## 2019-01-23 NOTE — TELEPHONE ENCOUNTER
----- Message from Sandi Antunez sent at 1/23/2019  8:39 AM CST -----  Contact: [phone]  RX request - refill or new RX.  Is this a refill or new RX:  Refill   RX name and strength: ketoconazole (NIZORAL) 2 % cream    Local pharmacy or mail order pharmacy:  [address] ([address]), [place] - [address]     [phone] (Phone)  [phone] (Fax)      Comments:  Please advise ,thanks

## 2019-03-18 ENCOUNTER — OFFICE VISIT (OUTPATIENT)
Dept: INTERNAL MEDICINE | Facility: CLINIC | Age: 64
End: 2019-03-18
Payer: OTHER GOVERNMENT

## 2019-03-18 VITALS
HEIGHT: 66 IN | SYSTOLIC BLOOD PRESSURE: 118 MMHG | HEART RATE: 56 BPM | OXYGEN SATURATION: 99 % | BODY MASS INDEX: 26.93 KG/M2 | DIASTOLIC BLOOD PRESSURE: 66 MMHG | WEIGHT: 167.56 LBS

## 2019-03-18 DIAGNOSIS — E78.5 HYPERLIPIDEMIA, UNSPECIFIED HYPERLIPIDEMIA TYPE: ICD-10-CM

## 2019-03-18 DIAGNOSIS — B35.9 TINEA: ICD-10-CM

## 2019-03-18 DIAGNOSIS — Z00.00 ROUTINE GENERAL MEDICAL EXAMINATION AT A HEALTH CARE FACILITY: Primary | ICD-10-CM

## 2019-03-18 DIAGNOSIS — I10 ESSENTIAL HYPERTENSION: ICD-10-CM

## 2019-03-18 DIAGNOSIS — R21 RASH: ICD-10-CM

## 2019-03-18 PROCEDURE — 99999 PR PBB SHADOW E&M-EST. PATIENT-LVL III: ICD-10-PCS | Mod: PBBFAC,,, | Performed by: INTERNAL MEDICINE

## 2019-03-18 PROCEDURE — 99397 PR PREVENTIVE VISIT,EST,65 & OVER: ICD-10-PCS | Mod: S$GLB,,, | Performed by: INTERNAL MEDICINE

## 2019-03-18 PROCEDURE — 99397 PER PM REEVAL EST PAT 65+ YR: CPT | Mod: S$GLB,,, | Performed by: INTERNAL MEDICINE

## 2019-03-18 PROCEDURE — 99999 PR PBB SHADOW E&M-EST. PATIENT-LVL III: CPT | Mod: PBBFAC,,, | Performed by: INTERNAL MEDICINE

## 2019-03-18 NOTE — PROGRESS NOTES
Subjective:       Patient ID: Jeremy Martinez is a 64 y.o. male.    Chief Complaint: Annual Exam    Herd for checkup and Patient is here for followup for chronic conditions.    L ring finger knuckles stiffness and pains. Mild injury -- jammed it 6 weeks ago. No locking.    Underarm rash itching again but rash improved with recent med.          Review of Systems   Constitutional: Negative for appetite change and unexpected weight change.   Respiratory: Negative for chest tightness and shortness of breath.    Cardiovascular: Negative for chest pain.   Gastrointestinal: Negative for abdominal pain.   Genitourinary: Negative for difficulty urinating, scrotal swelling and testicular pain.   Musculoskeletal: Positive for arthralgias (L ring finger). Negative for joint swelling and myalgias.   Skin: Positive for rash (under arms).        No lesions   Neurological: Negative for tremors, syncope and weakness.   Hematological: Negative for adenopathy. Does not bruise/bleed easily.       Objective:      Physical Exam   Constitutional: He is oriented to person, place, and time. He appears well-developed and well-nourished. No distress.   HENT:   Head: Normocephalic and atraumatic.   Eyes: No scleral icterus.   Neck: Normal range of motion. No thyromegaly present.   Cardiovascular: Normal rate, regular rhythm and normal heart sounds. Exam reveals no gallop and no friction rub.   No murmur heard.  Pulmonary/Chest: Effort normal and breath sounds normal. No respiratory distress. He has no wheezes. He has no rales.   Abdominal: Soft. Bowel sounds are normal. He exhibits no distension and no mass. There is no tenderness. There is no rebound and no guarding.   Musculoskeletal: Normal range of motion. He exhibits no edema.   bilat knees normal rom    L ring finger, mild MCP tenderness w/o any swelling or warmth, no triggering and no nodule detected.   Lymphadenopathy:     He has no cervical adenopathy.   Neurological: He is alert and  oriented to person, place, and time.   Skin: No lesion and no rash noted.   Chronic under arm hyperpigmented rash seen   Psychiatric: He has a normal mood and affect. Thought content normal.       Assessment:       1. Essential hypertension    2. Rash    3. Tinea    4. Hyperlipidemia, unspecified hyperlipidemia type    5. Routine general medical examination at a health care facility        Plan:       Jeremy ARORA was seen today for annual exam.    Diagnoses and all orders for this visit:    Essential hypertension  controlled    Rash  He will work more with nizoral and Explained that if not better in 1-2 weeks, pt should rtc/call PCP then see derm  Tinea    Hyperlipidemia, unspecified hyperlipidemia type  He prefers no statin, discussed kevin ASCVD risk. He only would like statin if numbers very high    L ring finger jamming inj -- discussed working with hand and finger with stretching and resistance ball.  Explained that if not better in 1-2 mos, pt should rtc/call PCP        Routine general medical examination at a health care facility  -     CBC auto differential; Future  -     Comprehensive metabolic panel; Future  -     Lipid panel; Future  -     CBC auto differential  -     Comprehensive metabolic panel  -     Lipid panel        Health Maintenance       Date Due Completion Date    TETANUS VACCINE 03/01/1972 ---    Zoster Vaccine 03/01/2014 ---    Influenza Vaccine 08/01/2018 1/26/2016    Pneumococcal Vaccine (65+ Low/Medium Risk) (1 of 2 - PCV13) 03/01/2019 ---    Abdominal Aortic Aneurysm Screening 03/01/2019 ---    Lipid Panel 03/08/2023 3/8/2018    Colonoscopy 08/01/2027 8/1/2017    Override on 6/4/2007: Done      Next time AAA screening and vaccines    Follow-up in about 1 year (around 3/18/2020).    No future appointments.

## 2019-03-19 LAB
ALBUMIN SERPL-MCNC: 4.6 G/DL (ref 3.6–5.1)
ALBUMIN/GLOB SERPL: 1.3 (CALC) (ref 1–2.5)
ALP SERPL-CCNC: 76 U/L (ref 40–115)
ALT SERPL-CCNC: 14 U/L (ref 9–46)
AST SERPL-CCNC: 20 U/L (ref 10–35)
BASOPHILS # BLD AUTO: 28 CELLS/UL (ref 0–200)
BASOPHILS NFR BLD AUTO: 0.5 %
BILIRUB SERPL-MCNC: 0.5 MG/DL (ref 0.2–1.2)
BUN SERPL-MCNC: 9 MG/DL (ref 7–25)
BUN/CREAT SERPL: ABNORMAL (CALC) (ref 6–22)
CALCIUM SERPL-MCNC: 9.7 MG/DL (ref 8.6–10.3)
CHLORIDE SERPL-SCNC: 94 MMOL/L (ref 98–110)
CHOLEST SERPL-MCNC: 201 MG/DL
CHOLEST/HDLC SERPL: 3 (CALC)
CO2 SERPL-SCNC: 25 MMOL/L (ref 20–32)
CREAT SERPL-MCNC: 0.94 MG/DL (ref 0.7–1.25)
EOSINOPHIL # BLD AUTO: 72 CELLS/UL (ref 15–500)
EOSINOPHIL NFR BLD AUTO: 1.3 %
ERYTHROCYTE [DISTWIDTH] IN BLOOD BY AUTOMATED COUNT: 15 % (ref 11–15)
GFRSERPLBLD MDRD-ARVRAT: 85 ML/MIN/1.73M2
GLOBULIN SER CALC-MCNC: 3.6 G/DL (CALC) (ref 1.9–3.7)
GLUCOSE SERPL-MCNC: 97 MG/DL (ref 65–99)
HCT VFR BLD AUTO: 40 % (ref 38.5–50)
HDLC SERPL-MCNC: 67 MG/DL
HGB BLD-MCNC: 12.7 G/DL (ref 13.2–17.1)
LDLC SERPL CALC-MCNC: 112 MG/DL (CALC)
LYMPHOCYTES # BLD AUTO: 2096 CELLS/UL (ref 850–3900)
LYMPHOCYTES NFR BLD AUTO: 38.1 %
MCH RBC QN AUTO: 24.4 PG (ref 27–33)
MCHC RBC AUTO-ENTMCNC: 31.8 G/DL (ref 32–36)
MCV RBC AUTO: 76.9 FL (ref 80–100)
MONOCYTES # BLD AUTO: 627 CELLS/UL (ref 200–950)
MONOCYTES NFR BLD AUTO: 11.4 %
NEUTROPHILS # BLD AUTO: 2679 CELLS/UL (ref 1500–7800)
NEUTROPHILS NFR BLD AUTO: 48.7 %
NONHDLC SERPL-MCNC: 134 MG/DL (CALC)
PLATELET # BLD AUTO: 305 THOUSAND/UL (ref 140–400)
PMV BLD REES-ECKER: 10.2 FL (ref 7.5–12.5)
POTASSIUM SERPL-SCNC: 4.2 MMOL/L (ref 3.5–5.3)
PROT SERPL-MCNC: 8.2 G/DL (ref 6.1–8.1)
RBC # BLD AUTO: 5.2 MILLION/UL (ref 4.2–5.8)
SODIUM SERPL-SCNC: 132 MMOL/L (ref 135–146)
TRIGL SERPL-MCNC: 112 MG/DL
WBC # BLD AUTO: 5.5 THOUSAND/UL (ref 3.8–10.8)

## 2020-01-08 ENCOUNTER — TELEPHONE (OUTPATIENT)
Dept: INTERNAL MEDICINE | Facility: CLINIC | Age: 65
End: 2020-01-08

## 2020-01-08 DIAGNOSIS — I10 ESSENTIAL HYPERTENSION: ICD-10-CM

## 2020-01-08 RX ORDER — LISINOPRIL AND HYDROCHLOROTHIAZIDE 12.5; 2 MG/1; MG/1
1 TABLET ORAL DAILY
Qty: 90 TABLET | Refills: 0 | Status: SHIPPED | OUTPATIENT
Start: 2020-01-08 | End: 2020-03-19

## 2020-01-08 NOTE — TELEPHONE ENCOUNTER
Hi, fyi prescription electronically sent in, Jose Abbott    Future Appointments   Date Time Provider Department Center   3/19/2020  8:00 AM [facility]

## 2020-01-08 NOTE — TELEPHONE ENCOUNTER
----- Message from Mila Way sent at 1/8/2020 10:41 AM CST -----  Contact: Patient [phone]  1.  Patient is calling for an RX refill or new RX.  Is this a refill or new RX:  refill  RX name and strength: lisinopril-hydrochlorothiazide (PRINZIDE,ZESTORETIC) 20-12.5 mg per tablet  Directions (copy/paste from chart):  Take 1 tablet by mouth once daily. - Oral   Is this a 30 day or 90 day RX:  90  Local pharmacy or mail order pharmacy:  local  Pharmacy name and phone #[facility] - [id number] - [address] [phone] (Phone) [phone] (Fax)    Comments:      2.  Doctor appointment has been scheduled.  Please schedule and link lab orders to the lab appointment.  Date of doctor appointment:  3/19/21      Physical or EP: Physical

## 2020-01-13 ENCOUNTER — OFFICE VISIT (OUTPATIENT)
Dept: INTERNAL MEDICINE | Facility: CLINIC | Age: 65
End: 2020-01-13
Payer: OTHER GOVERNMENT

## 2020-01-13 VITALS
WEIGHT: 166.69 LBS | OXYGEN SATURATION: 99 % | HEART RATE: 80 BPM | SYSTOLIC BLOOD PRESSURE: 150 MMHG | DIASTOLIC BLOOD PRESSURE: 90 MMHG | BODY MASS INDEX: 26.9 KG/M2

## 2020-01-13 DIAGNOSIS — R21 RASH: Primary | ICD-10-CM

## 2020-01-13 DIAGNOSIS — B35.9 TINEA: ICD-10-CM

## 2020-01-13 PROCEDURE — 99999 PR PBB SHADOW E&M-EST. PATIENT-LVL III: ICD-10-PCS | Mod: PBBFAC,,, | Performed by: PHYSICIAN ASSISTANT

## 2020-01-13 PROCEDURE — 99999 PR PBB SHADOW E&M-EST. PATIENT-LVL III: CPT | Mod: PBBFAC,,, | Performed by: PHYSICIAN ASSISTANT

## 2020-01-13 PROCEDURE — 99213 PR OFFICE/OUTPT VISIT, EST, LEVL III, 20-29 MIN: ICD-10-PCS | Mod: S$GLB,,, | Performed by: PHYSICIAN ASSISTANT

## 2020-01-13 PROCEDURE — 99213 OFFICE O/P EST LOW 20 MIN: CPT | Mod: S$GLB,,, | Performed by: PHYSICIAN ASSISTANT

## 2020-01-13 RX ORDER — CLOTRIMAZOLE AND BETAMETHASONE DIPROPIONATE 10; .64 MG/G; MG/G
CREAM TOPICAL 2 TIMES DAILY
Qty: 45 G | Refills: 0 | Status: SHIPPED | OUTPATIENT
Start: 2020-01-13 | End: 2023-02-18 | Stop reason: CLARIF

## 2020-01-13 NOTE — PROGRESS NOTES
Subjective:       Patient ID: Jeremy Martinez is a 64 y.o. male.    Chief Complaint: Rash (right arm x2 weeks )    HPI   Established pt of Jose Abbott MD     C/o rash to right upper arm. Onset about 2 week ago. Initially  thought it was an insect bite. He works frequently in his garden. Rash is occ itchy. He tried alcohol rub and neosporin without much improvement. He also tried left over ketoconazole cream for a few days. Made it more scaly. No new medications or other exposures.       Past Medical History:   Diagnosis Date    Erectile dysfunction     GERD (gastroesophageal reflux disease)     Hyperlipidemia 2008    Hypertension      Social History     Tobacco Use    Smoking status: Former Smoker     Years: 10.00     Last attempt to quit: 2000     Years since quittin.5    Smokeless tobacco: Never Used   Substance Use Topics    Alcohol use: Yes     Comment: beers 5 d per week, 3 at a time    Drug use: No     Review of patient's allergies indicates:   Allergen Reactions    Amoxicillin Hives and Rash          Review of Systems   Constitutional: Negative for chills and fever.   Respiratory: Negative for cough and shortness of breath.    Cardiovascular: Negative for chest pain and leg swelling.   Gastrointestinal: Negative for nausea and vomiting.   Skin: Positive for rash. Negative for color change and wound.   Neurological: Negative for weakness and headaches.       Objective: BP (!) 150/90   Pulse 80   Wt 75.6 kg (166 lb 10.7 oz)   SpO2 99%   BMI 26.90 kg/m²         Physical Exam   Constitutional: He appears well-developed and well-nourished. No distress.   HENT:   Head: Normocephalic and atraumatic.   Mouth/Throat: Oropharynx is clear and moist.   Cardiovascular: Normal rate and regular rhythm. Exam reveals no friction rub.   No murmur heard.  Pulmonary/Chest: Effort normal and breath sounds normal. He has no wheezes. He has no rales.   Neurological: He is alert.   Skin: Skin is warm  and dry. Capillary refill takes less than 2 seconds. No rash noted.   annular scaly patch to RUE with well demarcated border   Vitals reviewed.              Assessment:       1. Rash    2. Tinea        Plan:       Jeremy ARORA was seen today for rash.    Diagnoses and all orders for this visit:    Rash  Tinea  -     clotrimazole-betamethasone 1-0.05% (LOTRISONE) cream; Apply topically 2 (two) times daily.  - Notify if no improvement in 1-3 weeks, if so will place Derm referral for follow up.     Teri Webster PA-C

## 2020-01-13 NOTE — PATIENT INSTRUCTIONS
IF RASH DOES NOT IMPROVE OR WORSEN PLEASE CALL US AND WE WILL GET YOU SETUP IN DERMATOLOGY CLINIC.

## 2020-03-16 ENCOUNTER — TELEPHONE (OUTPATIENT)
Dept: INTERNAL MEDICINE | Facility: CLINIC | Age: 65
End: 2020-03-16

## 2020-03-16 DIAGNOSIS — Z00.00 ROUTINE GENERAL MEDICAL EXAMINATION AT A HEALTH CARE FACILITY: Primary | ICD-10-CM

## 2020-03-16 NOTE — TELEPHONE ENCOUNTER
----- Message from Sahara Carrillo sent at 3/16/2020  7:54 AM CDT -----  Contact: Jeremy--[phone]  Patient Requesting Order    Order Needed:  Blood work going to [facility]    Communication Preference: Jeremy-- [phone] or [phone]       Additional Information:  Mr. Luna is requesting a call back to schedule is blood work at the [facility] and needs to get his orders in order to go. He has an appt on 3/19 with the provider.

## 2020-03-16 NOTE — TELEPHONE ENCOUNTER
Hi, these orders are in for [facility] --  Orders Placed This Encounter    CBC auto differential    Comprehensive metabolic panel    Lipid panel    PSA, Screening     Let me know if patient has any questions.  Thank you, Jose Abbott

## 2020-03-18 LAB
ALBUMIN SERPL-MCNC: 4.3 G/DL (ref 3.6–5.1)
ALBUMIN/GLOB SERPL: 1.4 (CALC) (ref 1–2.5)
ALP SERPL-CCNC: 72 U/L (ref 35–144)
ALT SERPL-CCNC: 15 U/L (ref 9–46)
AST SERPL-CCNC: 16 U/L (ref 10–35)
BASOPHILS # BLD AUTO: 12 CELLS/UL (ref 0–200)
BASOPHILS NFR BLD AUTO: 0.2 %
BILIRUB SERPL-MCNC: 0.6 MG/DL (ref 0.2–1.2)
BUN SERPL-MCNC: 9 MG/DL (ref 7–25)
BUN/CREAT SERPL: ABNORMAL (CALC) (ref 6–22)
CALCIUM SERPL-MCNC: 9.4 MG/DL (ref 8.6–10.3)
CHLORIDE SERPL-SCNC: 96 MMOL/L (ref 98–110)
CHOLEST SERPL-MCNC: 213 MG/DL
CHOLEST/HDLC SERPL: 3.3 (CALC)
CO2 SERPL-SCNC: 24 MMOL/L (ref 20–32)
CREAT SERPL-MCNC: 0.96 MG/DL (ref 0.7–1.25)
EOSINOPHIL # BLD AUTO: 71 CELLS/UL (ref 15–500)
EOSINOPHIL NFR BLD AUTO: 1.2 %
ERYTHROCYTE [DISTWIDTH] IN BLOOD BY AUTOMATED COUNT: 14.6 % (ref 11–15)
GFRSERPLBLD MDRD-ARVRAT: 82 ML/MIN/1.73M2
GLOBULIN SER CALC-MCNC: 3 G/DL (CALC) (ref 1.9–3.7)
GLUCOSE SERPL-MCNC: 94 MG/DL (ref 65–99)
HCT VFR BLD AUTO: 39.1 % (ref 38.5–50)
HDLC SERPL-MCNC: 65 MG/DL
HGB BLD-MCNC: 12.5 G/DL (ref 13.2–17.1)
LDLC SERPL CALC-MCNC: 126 MG/DL (CALC)
LYMPHOCYTES # BLD AUTO: 2761 CELLS/UL (ref 850–3900)
LYMPHOCYTES NFR BLD AUTO: 46.8 %
MCH RBC QN AUTO: 24.5 PG (ref 27–33)
MCHC RBC AUTO-ENTMCNC: 32 G/DL (ref 32–36)
MCV RBC AUTO: 76.7 FL (ref 80–100)
MONOCYTES # BLD AUTO: 578 CELLS/UL (ref 200–950)
MONOCYTES NFR BLD AUTO: 9.8 %
NEUTROPHILS # BLD AUTO: 2478 CELLS/UL (ref 1500–7800)
NEUTROPHILS NFR BLD AUTO: 42 %
NONHDLC SERPL-MCNC: 148 MG/DL (CALC)
PLATELET # BLD AUTO: 297 THOUSAND/UL (ref 140–400)
PMV BLD REES-ECKER: 9.5 FL (ref 7.5–12.5)
POTASSIUM SERPL-SCNC: 4.2 MMOL/L (ref 3.5–5.3)
PROT SERPL-MCNC: 7.3 G/DL (ref 6.1–8.1)
PSA SERPL-MCNC: 0.4 NG/ML
RBC # BLD AUTO: 5.1 MILLION/UL (ref 4.2–5.8)
SODIUM SERPL-SCNC: 131 MMOL/L (ref 135–146)
TRIGL SERPL-MCNC: 109 MG/DL
WBC # BLD AUTO: 5.9 THOUSAND/UL (ref 3.8–10.8)

## 2020-03-19 ENCOUNTER — CLINICAL SUPPORT (OUTPATIENT)
Dept: INTERNAL MEDICINE | Facility: CLINIC | Age: 65
End: 2020-03-19
Payer: OTHER GOVERNMENT

## 2020-03-19 ENCOUNTER — OFFICE VISIT (OUTPATIENT)
Dept: INTERNAL MEDICINE | Facility: CLINIC | Age: 65
End: 2020-03-19
Payer: OTHER GOVERNMENT

## 2020-03-19 VITALS
DIASTOLIC BLOOD PRESSURE: 80 MMHG | SYSTOLIC BLOOD PRESSURE: 130 MMHG | HEART RATE: 68 BPM | OXYGEN SATURATION: 99 % | HEIGHT: 66 IN | WEIGHT: 164.88 LBS | BODY MASS INDEX: 26.5 KG/M2

## 2020-03-19 DIAGNOSIS — I10 ESSENTIAL HYPERTENSION: ICD-10-CM

## 2020-03-19 PROCEDURE — 90662 IIV NO PRSV INCREASED AG IM: CPT | Mod: S$GLB,,, | Performed by: INTERNAL MEDICINE

## 2020-03-19 PROCEDURE — 99214 OFFICE O/P EST MOD 30 MIN: CPT | Mod: 25,S$GLB,, | Performed by: INTERNAL MEDICINE

## 2020-03-19 PROCEDURE — 90471 IMMUNIZATION ADMIN: CPT | Mod: S$GLB,,, | Performed by: INTERNAL MEDICINE

## 2020-03-19 PROCEDURE — 99214 PR OFFICE/OUTPT VISIT, EST, LEVL IV, 30-39 MIN: ICD-10-PCS | Mod: 25,S$GLB,, | Performed by: INTERNAL MEDICINE

## 2020-03-19 PROCEDURE — 99999 PR PBB SHADOW E&M-EST. PATIENT-LVL I: ICD-10-PCS | Mod: PBBFAC,,,

## 2020-03-19 PROCEDURE — 90471 FLU VACCINE - HIGH DOSE (65+) PRESERVATIVE FREE IM: ICD-10-PCS | Mod: S$GLB,,, | Performed by: INTERNAL MEDICINE

## 2020-03-19 PROCEDURE — 99999 PR PBB SHADOW E&M-EST. PATIENT-LVL I: CPT | Mod: PBBFAC,,,

## 2020-03-19 PROCEDURE — 90662 FLU VACCINE - HIGH DOSE (65+) PRESERVATIVE FREE IM: ICD-10-PCS | Mod: S$GLB,,, | Performed by: INTERNAL MEDICINE

## 2020-03-19 PROCEDURE — 99999 PR PBB SHADOW E&M-EST. PATIENT-LVL III: ICD-10-PCS | Mod: PBBFAC,,, | Performed by: INTERNAL MEDICINE

## 2020-03-19 PROCEDURE — 99999 PR PBB SHADOW E&M-EST. PATIENT-LVL III: CPT | Mod: PBBFAC,,, | Performed by: INTERNAL MEDICINE

## 2020-03-19 RX ORDER — LISINOPRIL AND HYDROCHLOROTHIAZIDE 12.5; 2 MG/1; MG/1
1 TABLET ORAL DAILY
Qty: 90 TABLET | Refills: 11 | Status: SHIPPED | OUTPATIENT
Start: 2020-03-19 | End: 2021-01-05

## 2020-03-19 NOTE — PROGRESS NOTES
Subjective     HPI:  Mr. Jeremy Martinez, a 65 year old gentleman with a PMHx of HTN presents to clinic today for an annual exam. He reports he has been well and has no complaints about his health. In January, he saw BRITTANEY Avendano for a skin rash on his R upper arm. He was prescribed a topical anti-fungal and it has since resolved. It has left a scar in the location of the rash. His underarm rash from last year also resolved with anti-fungal cream. He reports good compliance with his HTN medications. He states that he remains very active in both landscaping and working in his vegetable garden at his home. He reports his diet could use some work.    Review of Systems   Constitutional: Negative for chills, fever and malaise/fatigue.   HENT: Negative for congestion, hearing loss and sore throat.    Eyes: Negative for blurred vision and double vision.        Uses artificial tears for dry eyes. Needs reading glasses   Respiratory: Negative for cough, hemoptysis and shortness of breath.    Cardiovascular: Negative for chest pain, palpitations and leg swelling.   Gastrointestinal: Negative for abdominal pain, constipation, diarrhea, nausea and vomiting.   Genitourinary: Negative for dysuria and hematuria.   Musculoskeletal: Negative for back pain and joint pain.   Skin: Negative for rash (resolved (both underarm and R upper arm)).   Neurological: Negative for dizziness, tremors and headaches.   Psychiatric/Behavioral: Negative for depression. The patient is not nervous/anxious.        Objective     Vitals:    03/19/20 0757   BP: 130/80   Pulse: 68     Physical Exam   Constitutional: He is oriented to person, place, and time. He appears well-developed and well-nourished. No distress.   HENT:   Mouth/Throat: Oropharynx is clear and moist. No oropharyngeal exudate.   Eyes: Conjunctivae and EOM are normal. No scleral icterus.   Neck: Normal range of motion. Neck supple.   Cardiovascular: Normal rate, regular rhythm and normal  heart sounds. Exam reveals no friction rub.   No murmur heard.  Pulmonary/Chest: Effort normal and breath sounds normal. No respiratory distress. He has no wheezes.   Abdominal: Soft. Bowel sounds are normal. He exhibits no distension. There is no tenderness.   Musculoskeletal: Normal range of motion. He exhibits no edema.   Lymphadenopathy:     He has no cervical adenopathy.   Neurological: He is alert and oriented to person, place, and time.   Skin: Skin is warm and dry. No rash noted.   Psychiatric: He has a normal mood and affect. His behavior is normal.   Vitals reviewed.    Assessment     1. HTN  2. HLD  3. Microcytic Anemia  4. Chronic Hyponatremia  5. Preventative Health    Plan     1. HTN - well controlled on current regimen   - Continue Lisinopril-HCTZ 20-12.5 mg daily   - Recommend continued exercise and dietary modifications    2. HLD - labs stable from previous 2 years   - Last visit, Mr. Martinez preferred not to begin statin therapy because of adverse effects from the medication (myalgia)   - We discussed lifestyle modifications he could adapt to try and reduce his LDL and cholesterol levels   - Will continue to monitor and discuss beginning a statin therapy if levels increase   - Repeat Lipid Panel in 1 year    3. Microcytic Anemia - chronic, stable, asymptomatic   - Repeat CBC in 1 year    4. Chronic Hyponatremia - chronic, stable, asymptomatic   - Repeat CMP in 1 year    5. Preventative Health   - Discussed pneumococcal, influenza, shingles vaccines   - He would like to receive the Flu vaccine today   - He is going to read about the shingles and pneumonia vaccines and schedule an appointment if he would like to receive them. We discussed the risks of both of these infections if he were to not receive the vaccines.   - He is going to consider a AAA screen due to his smoking history. Handout given on AAA screening in men over 65 years.    Patient Instructions   Consider getting the --  Pneumonia and  shingles vaccines    Consider screening for aneurysm    Consider seeing an eye doctor        RTC in 1 year for annual exam or as needed. Please contact if planning to receive the vaccine/screening test.    Everton Gaming, MS4  [employer]    I hereby acknowledge that I am relying upon documentation authored by a medical student working under my supervision and further I hereby attest that I have verified the student documentation or findings by personally performing the physical exam and medical decision making activities of the Evaluation and Management service to be billed.  Jose Abbott

## 2020-03-19 NOTE — PATIENT INSTRUCTIONS
Consider getting the --  Pneumonia and shingles vaccines    Consider screening for aneurysm    Consider seeing an eye doctor

## 2020-12-23 ENCOUNTER — NURSE TRIAGE (OUTPATIENT)
Dept: ADMINISTRATIVE | Facility: CLINIC | Age: 65
End: 2020-12-23

## 2020-12-23 NOTE — TELEPHONE ENCOUNTER
Pt offered and accepted ready responders. Pt stated he has been fluttering in his chest like his heart is racing. Pt stated it happens more when he is thinking about it. Pt stated he not sure if its anxiety. Pt stated it comes and goes. Nasal congestion. Yesterday bp was elevated but his heart was fine. Pt denies any chest pain. No difficulty breathing. Pt denies fluttering feeling at present time. Stated it happens more when he thinks about it. Care advice recommend pt see Md today. No appointments available. Reason for Disposition   Heart beating very rapidly (e.g., > 140 / minute) and not present now (Exception: during exercise)    Additional Information   Negative: Passed out (i.e., fainted, collapsed and was not responding)   Negative: Shock suspected (e.g., cold/pale/clammy skin, too weak to stand, low BP, rapid pulse)   Negative: Difficult to awaken or acting confused (e.g., disoriented, slurred speech)   Negative: Visible sweat on face or sweat dripping down face   Negative: Unable to walk, or can only walk with assistance (e.g., requires support)   Negative: Received SHOCK from implantable cardiac defibrillator and has persisting symptoms (i.e., palpitations, lightheadedness)   Negative: Dizziness, lightheadedness, or weakness and heart beating very slowly (e.g., < 50 / minute)   Negative: Dizziness, lightheadedness, or weakness and heart beating very rapidly (e.g., > 140 / minute)   Negative: Sounds like a life-threatening emergency to the triager   Negative: Chest pain   Negative: Difficulty breathing   Negative: Heart beating very rapidly (e.g., > 140 / minute) and present now (Exception: during exercise)   Negative: Dizziness, lightheadedness, or weakness   Negative: Heart beating very slowly (e.g., < 50 / minute) (Exception: athlete)   Negative: New or worsened shortness of breath with activity (dyspnea on exertion)   Negative: Patient sounds very sick or weak to the triager    Negative: Wearing a 'Holter monitor' or 'cardiac event monitor'   Negative: Received SHOCK from implantable cardiac defibrillator (and now feels well)    Protocols used: HEART RATE AND HEARTBEAT [id number]

## 2021-01-05 ENCOUNTER — OFFICE VISIT (OUTPATIENT)
Dept: INTERNAL MEDICINE | Facility: CLINIC | Age: 66
End: 2021-01-05
Payer: OTHER GOVERNMENT

## 2021-01-05 VITALS
BODY MASS INDEX: 25.54 KG/M2 | OXYGEN SATURATION: 99 % | DIASTOLIC BLOOD PRESSURE: 84 MMHG | WEIGHT: 158.94 LBS | HEART RATE: 60 BPM | SYSTOLIC BLOOD PRESSURE: 138 MMHG | HEIGHT: 66 IN

## 2021-01-05 DIAGNOSIS — E78.5 HYPERLIPIDEMIA, UNSPECIFIED HYPERLIPIDEMIA TYPE: ICD-10-CM

## 2021-01-05 DIAGNOSIS — D64.9 ANEMIA, UNSPECIFIED TYPE: ICD-10-CM

## 2021-01-05 DIAGNOSIS — I10 ESSENTIAL HYPERTENSION: Primary | ICD-10-CM

## 2021-01-05 DIAGNOSIS — E87.1 HYPONATREMIA: ICD-10-CM

## 2021-01-05 PROCEDURE — 99999 PR PBB SHADOW E&M-EST. PATIENT-LVL III: CPT | Mod: PBBFAC,,, | Performed by: NURSE PRACTITIONER

## 2021-01-05 PROCEDURE — 99214 PR OFFICE/OUTPT VISIT, EST, LEVL IV, 30-39 MIN: ICD-10-PCS | Mod: S$GLB,,, | Performed by: NURSE PRACTITIONER

## 2021-01-05 PROCEDURE — 99999 PR PBB SHADOW E&M-EST. PATIENT-LVL III: ICD-10-PCS | Mod: PBBFAC,,, | Performed by: NURSE PRACTITIONER

## 2021-01-05 PROCEDURE — 99214 OFFICE O/P EST MOD 30 MIN: CPT | Mod: S$GLB,,, | Performed by: NURSE PRACTITIONER

## 2021-01-05 RX ORDER — LISINOPRIL 40 MG/1
40 TABLET ORAL DAILY
Qty: 90 TABLET | Refills: 3 | Status: SHIPPED | OUTPATIENT
Start: 2021-01-05 | End: 2021-12-21

## 2021-01-26 ENCOUNTER — LAB VISIT (OUTPATIENT)
Dept: LAB | Facility: HOSPITAL | Age: 66
End: 2021-01-26
Payer: OTHER GOVERNMENT

## 2021-01-26 ENCOUNTER — OFFICE VISIT (OUTPATIENT)
Dept: INTERNAL MEDICINE | Facility: CLINIC | Age: 66
End: 2021-01-26
Payer: OTHER GOVERNMENT

## 2021-01-26 VITALS
BODY MASS INDEX: 25.94 KG/M2 | WEIGHT: 161.38 LBS | OXYGEN SATURATION: 99 % | SYSTOLIC BLOOD PRESSURE: 138 MMHG | HEART RATE: 90 BPM | DIASTOLIC BLOOD PRESSURE: 80 MMHG | HEIGHT: 66 IN

## 2021-01-26 DIAGNOSIS — E87.1 HYPONATREMIA: ICD-10-CM

## 2021-01-26 DIAGNOSIS — I10 ESSENTIAL HYPERTENSION: ICD-10-CM

## 2021-01-26 DIAGNOSIS — E78.5 HYPERLIPIDEMIA, UNSPECIFIED HYPERLIPIDEMIA TYPE: ICD-10-CM

## 2021-01-26 DIAGNOSIS — I10 ESSENTIAL HYPERTENSION: Primary | ICD-10-CM

## 2021-01-26 DIAGNOSIS — D64.9 ANEMIA, UNSPECIFIED TYPE: ICD-10-CM

## 2021-01-26 DIAGNOSIS — Z13.6 ENCOUNTER FOR ABDOMINAL AORTIC ANEURYSM (AAA) SCREENING: ICD-10-CM

## 2021-01-26 LAB
ANION GAP SERPL CALC-SCNC: 7 MMOL/L (ref 8–16)
BUN SERPL-MCNC: 10 MG/DL (ref 8–23)
CALCIUM SERPL-MCNC: 9.4 MG/DL (ref 8.7–10.5)
CHLORIDE SERPL-SCNC: 103 MMOL/L (ref 95–110)
CO2 SERPL-SCNC: 26 MMOL/L (ref 23–29)
CREAT SERPL-MCNC: 1 MG/DL (ref 0.5–1.4)
EST. GFR  (AFRICAN AMERICAN): >60 ML/MIN/1.73 M^2
EST. GFR  (NON AFRICAN AMERICAN): >60 ML/MIN/1.73 M^2
GLUCOSE SERPL-MCNC: 92 MG/DL (ref 70–110)
POTASSIUM SERPL-SCNC: 4.8 MMOL/L (ref 3.5–5.1)
SODIUM SERPL-SCNC: 136 MMOL/L (ref 136–145)

## 2021-01-26 PROCEDURE — 99999 PR PBB SHADOW E&M-EST. PATIENT-LVL III: ICD-10-PCS | Mod: PBBFAC,,, | Performed by: NURSE PRACTITIONER

## 2021-01-26 PROCEDURE — 80048 BASIC METABOLIC PNL TOTAL CA: CPT

## 2021-01-26 PROCEDURE — 99213 PR OFFICE/OUTPT VISIT, EST, LEVL III, 20-29 MIN: ICD-10-PCS | Mod: S$GLB,,, | Performed by: NURSE PRACTITIONER

## 2021-01-26 PROCEDURE — 99213 OFFICE O/P EST LOW 20 MIN: CPT | Mod: S$GLB,,, | Performed by: NURSE PRACTITIONER

## 2021-01-26 PROCEDURE — 99999 PR PBB SHADOW E&M-EST. PATIENT-LVL III: CPT | Mod: PBBFAC,,, | Performed by: NURSE PRACTITIONER

## 2021-01-26 PROCEDURE — 36415 COLL VENOUS BLD VENIPUNCTURE: CPT

## 2021-01-28 ENCOUNTER — HOSPITAL ENCOUNTER (OUTPATIENT)
Dept: CARDIOLOGY | Facility: HOSPITAL | Age: 66
Discharge: HOME | End: 2021-01-28
Attending: NURSE PRACTITIONER
Payer: OTHER GOVERNMENT

## 2021-01-28 DIAGNOSIS — Z13.6 ENCOUNTER FOR ABDOMINAL AORTIC ANEURYSM (AAA) SCREENING: ICD-10-CM

## 2021-01-28 LAB
ABDOMINAL IMA AP: 1.71 CM
ABDOMINAL IMA ED VEL: 0 CM/S
ABDOMINAL IMA PS VEL: 84 CM/S
ABDOMINAL IMA TRANS: 1.74 CM
ABDOMINAL INFRARENAL AORTA AP: 2.01 CM
ABDOMINAL INFRARENAL AORTA ED VEL: 0 CM/S
ABDOMINAL INFRARENAL AORTA PS VEL: 88 CM/S
ABDOMINAL INFRARENAL AORTA TRANS: 1.98 CM
ABDOMINAL JUXTARENAL AORTA AP: 1.97 CM
ABDOMINAL JUXTARENAL AORTA ED VEL: 0 CM/S
ABDOMINAL JUXTARENAL AORTA PS VEL: 66 CM/S
ABDOMINAL JUXTARENAL AORTA TRANS: 1.8 CM
ABDOMINAL LT COM ILIAC AP: 1.29 CM
ABDOMINAL LT COM ILIAC TRANS: 1.32 CM
ABDOMINAL LT COM ILIAC VEL: 77 CM/S
ABDOMINAL LT COM ILLIAC ED VEL: 0 CM/S
ABDOMINAL RT COM ILIAC AP: 1.46 CM
ABDOMINAL RT COM ILIAC TRANS: 1.37 CM
ABDOMINAL RT COM ILIAC VEL: 94 CM/S
ABDOMINAL RT COM ILLIAC ED VEL: 0 CM/S
ABDOMINAL SUPRARENAL AORTA AP: 2.5 CM
ABDOMINAL SUPRARENAL AORTA ED VEL: 11 CM/S
ABDOMINAL SUPRARENAL AORTA PS VEL: 60 CM/S
ABDOMINAL SUPRARENAL AORTA TRANS: 2.25 CM

## 2021-01-28 PROCEDURE — 93978 VASCULAR STUDY: CPT

## 2021-01-28 PROCEDURE — 93978 CV US ABDOMINAL AORTA EVALUATION (CUPID ONLY): ICD-10-PCS | Mod: 26,,, | Performed by: INTERNAL MEDICINE

## 2021-01-28 PROCEDURE — 93978 VASCULAR STUDY: CPT | Mod: 26,,, | Performed by: INTERNAL MEDICINE

## 2021-02-20 ENCOUNTER — IMMUNIZATION (OUTPATIENT)
Dept: PRIMARY CARE CLINIC | Facility: CLINIC | Age: 66
End: 2021-02-20

## 2021-02-20 DIAGNOSIS — Z23 NEED FOR VACCINATION: Primary | ICD-10-CM

## 2021-02-20 PROCEDURE — 91300 PR SARS-COV- 2 COVID-19 VACCINE, NO PRSV, 30MCG/0.3ML, IM: CPT | Mod: S$GLB,,, | Performed by: INTERNAL MEDICINE

## 2021-02-20 PROCEDURE — 0001A PR IMMUNIZ ADMIN, SARS-COV-2 COVID-19 VACC, 30MCG/0.3ML, 1ST DOSE: ICD-10-PCS | Mod: CV19,S$GLB,, | Performed by: INTERNAL MEDICINE

## 2021-02-20 PROCEDURE — 91300 PR SARS-COV- 2 COVID-19 VACCINE, NO PRSV, 30MCG/0.3ML, IM: ICD-10-PCS | Mod: S$GLB,,, | Performed by: INTERNAL MEDICINE

## 2021-02-20 PROCEDURE — 0001A PR IMMUNIZ ADMIN, SARS-COV-2 COVID-19 VACC, 30MCG/0.3ML, 1ST DOSE: CPT | Mod: CV19,S$GLB,, | Performed by: INTERNAL MEDICINE

## 2021-02-20 RX ADMIN — Medication 0.3 ML: at 02:02

## 2021-03-13 ENCOUNTER — IMMUNIZATION (OUTPATIENT)
Dept: PRIMARY CARE CLINIC | Facility: CLINIC | Age: 66
End: 2021-03-13
Payer: OTHER GOVERNMENT

## 2021-03-13 DIAGNOSIS — Z23 NEED FOR VACCINATION: Primary | ICD-10-CM

## 2021-03-13 PROCEDURE — 0002A PR IMMUNIZ ADMIN, SARS-COV-2 COVID-19 VACC, 30MCG/0.3ML, 2ND DOSE: ICD-10-PCS | Mod: CV19,S$GLB,, | Performed by: INTERNAL MEDICINE

## 2021-03-13 PROCEDURE — 0002A PR IMMUNIZ ADMIN, SARS-COV-2 COVID-19 VACC, 30MCG/0.3ML, 2ND DOSE: CPT | Mod: CV19,S$GLB,, | Performed by: INTERNAL MEDICINE

## 2021-03-13 PROCEDURE — 91300 PR SARS-COV- 2 COVID-19 VACCINE, NO PRSV, 30MCG/0.3ML, IM: ICD-10-PCS | Mod: S$GLB,,, | Performed by: INTERNAL MEDICINE

## 2021-03-13 PROCEDURE — 91300 PR SARS-COV- 2 COVID-19 VACCINE, NO PRSV, 30MCG/0.3ML, IM: CPT | Mod: S$GLB,,, | Performed by: INTERNAL MEDICINE

## 2021-03-13 RX ADMIN — Medication 0.3 ML: at 12:03

## 2021-06-11 RX ORDER — SILDENAFIL 100 MG/1
100 TABLET, FILM COATED ORAL
Qty: 12 TABLET | Refills: 1 | Status: SHIPPED | OUTPATIENT
Start: 2021-06-11 | End: 2022-04-26 | Stop reason: SDUPTHER

## 2021-07-06 ENCOUNTER — PATIENT OUTREACH (OUTPATIENT)
Dept: ADMINISTRATIVE | Facility: HOSPITAL | Age: 66
End: 2021-07-06

## 2021-07-06 ENCOUNTER — PATIENT MESSAGE (OUTPATIENT)
Dept: ADMINISTRATIVE | Facility: HOSPITAL | Age: 66
End: 2021-07-06

## 2021-07-09 ENCOUNTER — TELEPHONE (OUTPATIENT)
Dept: INTERNAL MEDICINE | Facility: CLINIC | Age: 66
End: 2021-07-09

## 2021-07-09 DIAGNOSIS — E78.5 HYPERLIPIDEMIA, UNSPECIFIED HYPERLIPIDEMIA TYPE: ICD-10-CM

## 2021-07-09 DIAGNOSIS — I10 ESSENTIAL HYPERTENSION: Primary | ICD-10-CM

## 2021-07-09 DIAGNOSIS — E87.1 HYPONATREMIA: ICD-10-CM

## 2021-07-09 DIAGNOSIS — Z12.5 SCREENING PSA (PROSTATE SPECIFIC ANTIGEN): ICD-10-CM

## 2021-09-24 LAB
ALBUMIN SERPL-MCNC: 4.3 G/DL (ref 3.6–5.1)
ALBUMIN/GLOB SERPL: 1.4 (CALC) (ref 1–2.5)
ALP SERPL-CCNC: 63 U/L (ref 35–144)
ALT SERPL-CCNC: 10 U/L (ref 9–46)
AST SERPL-CCNC: 17 U/L (ref 10–35)
BASOPHILS # BLD AUTO: 20 CELLS/UL (ref 0–200)
BASOPHILS NFR BLD AUTO: 0.3 %
BILIRUB SERPL-MCNC: 0.9 MG/DL (ref 0.2–1.2)
BUN SERPL-MCNC: 15 MG/DL (ref 7–25)
BUN/CREAT SERPL: ABNORMAL (CALC) (ref 6–22)
CALCIUM SERPL-MCNC: 9.1 MG/DL (ref 8.6–10.3)
CHLORIDE SERPL-SCNC: 95 MMOL/L (ref 98–110)
CHOLEST SERPL-MCNC: 220 MG/DL
CHOLEST/HDLC SERPL: 3.5 (CALC)
CO2 SERPL-SCNC: 24 MMOL/L (ref 20–32)
CREAT SERPL-MCNC: 1.1 MG/DL (ref 0.7–1.25)
EOSINOPHIL # BLD AUTO: 150 CELLS/UL (ref 15–500)
EOSINOPHIL NFR BLD AUTO: 2.3 %
ERYTHROCYTE [DISTWIDTH] IN BLOOD BY AUTOMATED COUNT: 14.7 % (ref 11–15)
GLOBULIN SER CALC-MCNC: 3.1 G/DL (CALC) (ref 1.9–3.7)
GLUCOSE SERPL-MCNC: 75 MG/DL (ref 65–99)
HCT VFR BLD AUTO: 39 % (ref 38.5–50)
HDLC SERPL-MCNC: 63 MG/DL
HGB BLD-MCNC: 11.9 G/DL (ref 13.2–17.1)
LDLC SERPL CALC-MCNC: 140 MG/DL (CALC)
LYMPHOCYTES # BLD AUTO: 3231 CELLS/UL (ref 850–3900)
LYMPHOCYTES NFR BLD AUTO: 49.7 %
MCH RBC QN AUTO: 23.7 PG (ref 27–33)
MCHC RBC AUTO-ENTMCNC: 30.5 G/DL (ref 32–36)
MCV RBC AUTO: 77.5 FL (ref 80–100)
MONOCYTES # BLD AUTO: 644 CELLS/UL (ref 200–950)
MONOCYTES NFR BLD AUTO: 9.9 %
NEUTROPHILS # BLD AUTO: 2457 CELLS/UL (ref 1500–7800)
NEUTROPHILS NFR BLD AUTO: 37.8 %
NONHDLC SERPL-MCNC: 157 MG/DL (CALC)
PLATELET # BLD AUTO: 283 THOUSAND/UL (ref 140–400)
PMV BLD REES-ECKER: 9.9 FL (ref 7.5–12.5)
POTASSIUM SERPL-SCNC: 4.5 MMOL/L (ref 3.5–5.3)
PROT SERPL-MCNC: 7.4 G/DL (ref 6.1–8.1)
PSA SERPL-MCNC: 0.56 NG/ML
RBC # BLD AUTO: 5.03 MILLION/UL (ref 4.2–5.8)
SODIUM SERPL-SCNC: 129 MMOL/L (ref 135–146)
TRIGL SERPL-MCNC: 76 MG/DL
WBC # BLD AUTO: 6.5 THOUSAND/UL (ref 3.8–10.8)

## 2021-09-27 ENCOUNTER — OFFICE VISIT (OUTPATIENT)
Dept: INTERNAL MEDICINE | Facility: CLINIC | Age: 66
End: 2021-09-27
Payer: OTHER GOVERNMENT

## 2021-09-27 VITALS
BODY MASS INDEX: 26.58 KG/M2 | DIASTOLIC BLOOD PRESSURE: 84 MMHG | WEIGHT: 165.38 LBS | HEIGHT: 66 IN | SYSTOLIC BLOOD PRESSURE: 138 MMHG | RESPIRATION RATE: 18 BRPM | OXYGEN SATURATION: 98 % | HEART RATE: 70 BPM

## 2021-09-27 DIAGNOSIS — E87.1 HYPONATREMIA: Primary | ICD-10-CM

## 2021-09-27 PROCEDURE — 99214 PR OFFICE/OUTPT VISIT, EST, LEVL IV, 30-39 MIN: ICD-10-PCS | Mod: S$GLB,,, | Performed by: INTERNAL MEDICINE

## 2021-09-27 PROCEDURE — 99999 PR PBB SHADOW E&M-EST. PATIENT-LVL IV: ICD-10-PCS | Mod: PBBFAC,,, | Performed by: INTERNAL MEDICINE

## 2021-09-27 PROCEDURE — 99214 OFFICE O/P EST MOD 30 MIN: CPT | Mod: S$GLB,,, | Performed by: INTERNAL MEDICINE

## 2021-09-27 PROCEDURE — 99999 PR PBB SHADOW E&M-EST. PATIENT-LVL IV: CPT | Mod: PBBFAC,,, | Performed by: INTERNAL MEDICINE

## 2021-09-29 LAB
CORTIS AM PEAK SERPL-MCNC: 15.8 MCG/DL
TSH SERPL-ACNC: 1.45 MIU/L (ref 0.4–4.5)

## 2022-03-28 DIAGNOSIS — E87.1 HYPONATREMIA: ICD-10-CM

## 2022-03-28 DIAGNOSIS — I10 ESSENTIAL HYPERTENSION: ICD-10-CM

## 2022-03-29 RX ORDER — LISINOPRIL 40 MG/1
40 TABLET ORAL DAILY
Qty: 90 TABLET | Refills: 0 | Status: SHIPPED | OUTPATIENT
Start: 2022-03-29 | End: 2022-06-27

## 2022-04-27 RX ORDER — SILDENAFIL 100 MG/1
100 TABLET, FILM COATED ORAL
Qty: 12 TABLET | Refills: 1 | Status: SHIPPED | OUTPATIENT
Start: 2022-04-27 | End: 2023-09-06 | Stop reason: SDUPTHER

## 2022-04-27 NOTE — TELEPHONE ENCOUNTER
Refill Authorization Note   Jeremy Martinez  is requesting a refill authorization.  Brief Assessment and Rationale for Refill:  Approve     Medication Therapy Plan:       Medication Reconciliation Completed: No   Comments:     No Care Gaps recommended.     Note composed:11:00 AM 04/27/2022

## 2022-04-27 NOTE — TELEPHONE ENCOUNTER
No new care gaps identified.  Powered by [employer]. Reference number: [id number].   4/26/2022 7:02:17 PM CDT

## 2022-04-27 NOTE — TELEPHONE ENCOUNTER
Refill Routing Note   Medication(s) are not appropriate for processing by [facility] for the following reason(s):      - Patient displays non-adherence to medications (has run out of medication supply over 6 months ago)    [facility] action(s):  Defer Medication-related problems identified: Non-adherence - intentional        Medication reconciliation completed: No     Appointments  past 12m or future 3m with PCP    Date Provider   Last Visit   9/27/2021 Jose Abbott MD   Next Visit   Visit date not found Jose Abbott MD   ED visits in past 90 days: 0        Note composed:3:32 PM 04/27/2022

## 2022-10-03 ENCOUNTER — OFFICE VISIT (OUTPATIENT)
Dept: INTERNAL MEDICINE | Facility: CLINIC | Age: 67
End: 2022-10-03
Payer: MEDICARE

## 2022-10-03 ENCOUNTER — LAB VISIT (OUTPATIENT)
Dept: LAB | Facility: HOSPITAL | Age: 67
End: 2022-10-03
Attending: INTERNAL MEDICINE
Payer: MEDICARE

## 2022-10-03 ENCOUNTER — IMMUNIZATION (OUTPATIENT)
Dept: INTERNAL MEDICINE | Facility: CLINIC | Age: 67
End: 2022-10-03
Payer: MEDICARE

## 2022-10-03 VITALS
DIASTOLIC BLOOD PRESSURE: 96 MMHG | WEIGHT: 158.75 LBS | HEIGHT: 66 IN | OXYGEN SATURATION: 98 % | SYSTOLIC BLOOD PRESSURE: 150 MMHG | BODY MASS INDEX: 25.51 KG/M2 | HEART RATE: 82 BPM | RESPIRATION RATE: 18 BRPM

## 2022-10-03 DIAGNOSIS — M65.4 TENOSYNOVITIS, DE QUERVAIN: ICD-10-CM

## 2022-10-03 DIAGNOSIS — E87.1 HYPONATREMIA: ICD-10-CM

## 2022-10-03 DIAGNOSIS — Z23 NEED FOR VACCINATION: Primary | ICD-10-CM

## 2022-10-03 DIAGNOSIS — Z12.5 SCREENING PSA (PROSTATE SPECIFIC ANTIGEN): ICD-10-CM

## 2022-10-03 DIAGNOSIS — I10 ESSENTIAL HYPERTENSION: ICD-10-CM

## 2022-10-03 DIAGNOSIS — I10 ESSENTIAL HYPERTENSION: Primary | ICD-10-CM

## 2022-10-03 LAB
ALBUMIN SERPL BCP-MCNC: 4 G/DL (ref 3.5–5.2)
ALP SERPL-CCNC: 75 U/L (ref 55–135)
ALT SERPL W/O P-5'-P-CCNC: 12 U/L (ref 10–44)
ANION GAP SERPL CALC-SCNC: 12 MMOL/L (ref 8–16)
AST SERPL-CCNC: 18 U/L (ref 10–40)
BASOPHILS # BLD AUTO: 0.02 K/UL (ref 0–0.2)
BASOPHILS NFR BLD: 0.3 % (ref 0–1.9)
BILIRUB SERPL-MCNC: 0.6 MG/DL (ref 0.1–1)
BUN SERPL-MCNC: 8 MG/DL (ref 8–23)
CALCIUM SERPL-MCNC: 9.7 MG/DL (ref 8.7–10.5)
CHLORIDE SERPL-SCNC: 97 MMOL/L (ref 95–110)
CHOLEST SERPL-MCNC: 232 MG/DL (ref 120–199)
CHOLEST/HDLC SERPL: 4 {RATIO} (ref 2–5)
CO2 SERPL-SCNC: 20 MMOL/L (ref 23–29)
COMPLEXED PSA SERPL-MCNC: 0.45 NG/ML (ref 0–4)
CREAT SERPL-MCNC: 1 MG/DL (ref 0.5–1.4)
DIFFERENTIAL METHOD: ABNORMAL
EOSINOPHIL # BLD AUTO: 0.1 K/UL (ref 0–0.5)
EOSINOPHIL NFR BLD: 1 % (ref 0–8)
ERYTHROCYTE [DISTWIDTH] IN BLOOD BY AUTOMATED COUNT: 15.3 % (ref 11.5–14.5)
EST. GFR  (NO RACE VARIABLE): >60 ML/MIN/1.73 M^2
GLUCOSE SERPL-MCNC: 81 MG/DL (ref 70–110)
HCT VFR BLD AUTO: 40.6 % (ref 40–54)
HDLC SERPL-MCNC: 58 MG/DL (ref 40–75)
HDLC SERPL: 25 % (ref 20–50)
HGB BLD-MCNC: 12.9 G/DL (ref 14–18)
IMM GRANULOCYTES # BLD AUTO: 0.01 K/UL (ref 0–0.04)
IMM GRANULOCYTES NFR BLD AUTO: 0.2 % (ref 0–0.5)
LDLC SERPL CALC-MCNC: 158.4 MG/DL (ref 63–159)
LYMPHOCYTES # BLD AUTO: 2.3 K/UL (ref 1–4.8)
LYMPHOCYTES NFR BLD: 37.9 % (ref 18–48)
MCH RBC QN AUTO: 24 PG (ref 27–31)
MCHC RBC AUTO-ENTMCNC: 31.8 G/DL (ref 32–36)
MCV RBC AUTO: 76 FL (ref 82–98)
MONOCYTES # BLD AUTO: 0.7 K/UL (ref 0.3–1)
MONOCYTES NFR BLD: 10.8 % (ref 4–15)
NEUTROPHILS # BLD AUTO: 3 K/UL (ref 1.8–7.7)
NEUTROPHILS NFR BLD: 49.8 % (ref 38–73)
NONHDLC SERPL-MCNC: 174 MG/DL
NRBC BLD-RTO: 0 /100 WBC
PLATELET # BLD AUTO: 326 K/UL (ref 150–450)
PMV BLD AUTO: 10.3 FL (ref 9.2–12.9)
POTASSIUM SERPL-SCNC: 5.1 MMOL/L (ref 3.5–5.1)
PROT SERPL-MCNC: 8.1 G/DL (ref 6–8.4)
RBC # BLD AUTO: 5.37 M/UL (ref 4.6–6.2)
SODIUM SERPL-SCNC: 129 MMOL/L (ref 136–145)
TRIGL SERPL-MCNC: 78 MG/DL (ref 30–150)
WBC # BLD AUTO: 6.04 K/UL (ref 3.9–12.7)

## 2022-10-03 PROCEDURE — 3077F PR MOST RECENT SYSTOLIC BLOOD PRESSURE >= 140 MM HG: ICD-10-PCS | Mod: CPTII,S$GLB,, | Performed by: INTERNAL MEDICINE

## 2022-10-03 PROCEDURE — 91312 COVID-19, MRNA, LNP-S, BIVALENT BOOSTER, PF, 30 MCG/0.3 ML DOSE: CPT | Mod: S$GLB,,, | Performed by: INTERNAL MEDICINE

## 2022-10-03 PROCEDURE — 99214 OFFICE O/P EST MOD 30 MIN: CPT | Mod: S$GLB,,, | Performed by: INTERNAL MEDICINE

## 2022-10-03 PROCEDURE — 99999 PR PBB SHADOW E&M-EST. PATIENT-LVL IV: CPT | Mod: PBBFAC,,, | Performed by: INTERNAL MEDICINE

## 2022-10-03 PROCEDURE — 1125F AMNT PAIN NOTED PAIN PRSNT: CPT | Mod: CPTII,S$GLB,, | Performed by: INTERNAL MEDICINE

## 2022-10-03 PROCEDURE — 0124A PR IMMUNIZ ADMIN, SARS-COV- 2 COVID-19 VACCINE, 30MCG/0.3ML, BIVALENT, BOOSTER DOSE: CPT | Mod: S$GLB,,, | Performed by: INTERNAL MEDICINE

## 2022-10-03 PROCEDURE — 80053 COMPREHEN METABOLIC PANEL: CPT | Performed by: INTERNAL MEDICINE

## 2022-10-03 PROCEDURE — 80061 LIPID PANEL: CPT | Performed by: INTERNAL MEDICINE

## 2022-10-03 PROCEDURE — 91312 COVID-19, MRNA, LNP-S, BIVALENT BOOSTER, PF, 30 MCG/0.3 ML DOSE: ICD-10-PCS | Mod: S$GLB,,, | Performed by: INTERNAL MEDICINE

## 2022-10-03 PROCEDURE — 99999 PR PBB SHADOW E&M-EST. PATIENT-LVL IV: ICD-10-PCS | Mod: PBBFAC,,, | Performed by: INTERNAL MEDICINE

## 2022-10-03 PROCEDURE — 36415 COLL VENOUS BLD VENIPUNCTURE: CPT | Performed by: INTERNAL MEDICINE

## 2022-10-03 PROCEDURE — 3288F PR FALLS RISK ASSESSMENT DOCUMENTED: ICD-10-PCS | Mod: CPTII,S$GLB,, | Performed by: INTERNAL MEDICINE

## 2022-10-03 PROCEDURE — 1125F PR PAIN SEVERITY QUANTIFIED, PAIN PRESENT: ICD-10-PCS | Mod: CPTII,S$GLB,, | Performed by: INTERNAL MEDICINE

## 2022-10-03 PROCEDURE — 84153 ASSAY OF PSA TOTAL: CPT | Performed by: INTERNAL MEDICINE

## 2022-10-03 PROCEDURE — 1101F PR PT FALLS ASSESS DOC 0-1 FALLS W/OUT INJ PAST YR: ICD-10-PCS | Mod: CPTII,S$GLB,, | Performed by: INTERNAL MEDICINE

## 2022-10-03 PROCEDURE — 0124A PR IMMUNIZ ADMIN, SARS-COV- 2 COVID-19 VACCINE, 30MCG/0.3ML, BIVALENT, BOOSTER DOSE: ICD-10-PCS | Mod: S$GLB,,, | Performed by: INTERNAL MEDICINE

## 2022-10-03 PROCEDURE — 4010F PR ACE/ARB THEARPY RXD/TAKEN: ICD-10-PCS | Mod: CPTII,S$GLB,, | Performed by: INTERNAL MEDICINE

## 2022-10-03 PROCEDURE — 85025 COMPLETE CBC W/AUTO DIFF WBC: CPT | Performed by: INTERNAL MEDICINE

## 2022-10-03 PROCEDURE — 1101F PT FALLS ASSESS-DOCD LE1/YR: CPT | Mod: CPTII,S$GLB,, | Performed by: INTERNAL MEDICINE

## 2022-10-03 PROCEDURE — 3288F FALL RISK ASSESSMENT DOCD: CPT | Mod: CPTII,S$GLB,, | Performed by: INTERNAL MEDICINE

## 2022-10-03 PROCEDURE — 99214 PR OFFICE/OUTPT VISIT, EST, LEVL IV, 30-39 MIN: ICD-10-PCS | Mod: S$GLB,,, | Performed by: INTERNAL MEDICINE

## 2022-10-03 PROCEDURE — 4010F ACE/ARB THERAPY RXD/TAKEN: CPT | Mod: CPTII,S$GLB,, | Performed by: INTERNAL MEDICINE

## 2022-10-03 PROCEDURE — 0124A COVID-19, MRNA, LNP-S, BIVALENT BOOSTER, PF, 30 MCG/0.3 ML DOSE: CPT | Mod: CV19,PBBFAC | Performed by: INTERNAL MEDICINE

## 2022-10-03 PROCEDURE — 3080F PR MOST RECENT DIASTOLIC BLOOD PRESSURE >= 90 MM HG: ICD-10-PCS | Mod: CPTII,S$GLB,, | Performed by: INTERNAL MEDICINE

## 2022-10-03 PROCEDURE — 3077F SYST BP >= 140 MM HG: CPT | Mod: CPTII,S$GLB,, | Performed by: INTERNAL MEDICINE

## 2022-10-03 PROCEDURE — 3080F DIAST BP >= 90 MM HG: CPT | Mod: CPTII,S$GLB,, | Performed by: INTERNAL MEDICINE

## 2022-10-03 RX ORDER — AMLODIPINE BESYLATE 10 MG/1
10 TABLET ORAL DAILY
Qty: 90 TABLET | Refills: 11 | Status: SHIPPED | OUTPATIENT
Start: 2022-10-03 | End: 2023-02-18 | Stop reason: CLARIF

## 2022-10-03 RX ORDER — LISINOPRIL 40 MG/1
40 TABLET ORAL DAILY
Qty: 90 TABLET | Refills: 11 | Status: SHIPPED | OUTPATIENT
Start: 2022-10-03 | End: 2023-09-05 | Stop reason: SDUPTHER

## 2022-10-03 RX ORDER — ACETAMINOPHEN 325 MG/1
325 TABLET ORAL EVERY 6 HOURS PRN
Status: ON HOLD | COMMUNITY
End: 2023-03-31 | Stop reason: HOSPADM

## 2022-10-03 NOTE — PROGRESS NOTES
.Clinic Note  10/03/2022      Subjective:           Chief Complaint: Annual Exam    Patient ID: Jeremy Martinez is a 67 y.o. male being seen for an established visit.    Pt is a 67 year old male with PMH of HTN, HLD, GERD, and ED presenting for a follow-up and also complaining of some reflux symptoms and L wrist pain. The pain started after he had been picking up and moving things a few weeks ago. Pt reports worsening with pulling back on his thumb. Pt has tried voltaron gel and ace wraps with some benefit but does not fully go away. Pt is a very active hesham and works outside and does not feel like he can take a break and rest it.     Pt's GERD symptoms are on and off, says mainly with acidic, belches, has become a little more persistent and having some pain in the epigastric reason.    Review of Systems   Constitutional:  Negative for activity change, chills, fatigue and fever.   HENT:  Positive for congestion. Negative for sinus pain and sore throat.    Eyes:  Negative for photophobia and visual disturbance.   Respiratory:  Negative for cough and shortness of breath.    Cardiovascular:  Negative for chest pain and leg swelling.   Gastrointestinal:  Positive for abdominal pain (epigastric, with reflux.). Negative for constipation and diarrhea.   Genitourinary:  Negative for difficulty urinating, dysuria and hematuria.   Musculoskeletal:  Positive for arthralgias (with work). Negative for back pain.   Skin:  Negative for rash and wound.   Neurological:  Negative for dizziness, weakness, light-headedness and headaches.   Psychiatric/Behavioral:  Negative for confusion and decreased concentration.      Patient's Medications   New Prescriptions    AMLODIPINE (NORVASC) 10 MG TABLET    Take 1 tablet (10 mg total) by mouth once daily.   Previous Medications    ACETAMINOPHEN (TYLENOL) 325 MG TABLET    Take 325 mg by mouth every 6 (six) hours as needed for Pain.    CLOTRIMAZOLE-BETAMETHASONE 1-0.05% (LOTRISONE) CREAM    Apply  "topically 2 (two) times daily.    SILDENAFIL (VIAGRA) 100 MG TABLET    Take 1 tablet (100 mg total) by mouth as needed for Erectile Dysfunction.   Modified Medications    Modified Medication Previous Medication    LISINOPRIL (PRINIVIL,ZESTRIL) 40 MG TABLET lisinopriL (PRINIVIL,ZESTRIL) 40 MG tablet       Take 1 tablet (40 mg total) by mouth once daily.    Take 1 tablet by mouth once daily   Discontinued Medications    No medications on file       Patient Active Problem List    Diagnosis Date Noted    Screening for colon cancer 08/01/2017    Anemia 10/14/2013    Hypertension     Hyperlipidemia     GERD (gastroesophageal reflux disease)     Erectile dysfunction            Objective:      BP (!) 150/96   Pulse 82   Resp 18   Ht 5' 6" (1.676 m)   Wt 72 kg (158 lb 11.7 oz)   SpO2 98%   BMI 25.62 kg/m²   Estimated body mass index is 25.62 kg/m² as calculated from the following:    Height as of this encounter: 5' 6" (1.676 m).    Weight as of this encounter: 72 kg (158 lb 11.7 oz).    Physical Exam  Vitals reviewed.   Constitutional:       General: He is awake. He is not in acute distress.     Appearance: Normal appearance. He is overweight.   HENT:      Head: Normocephalic and atraumatic.      Nose: Nose normal.   Cardiovascular:      Rate and Rhythm: Normal rate and regular rhythm.      Pulses: Normal pulses.   Pulmonary:      Effort: Pulmonary effort is normal. No respiratory distress.      Breath sounds: Normal breath sounds.   Abdominal:      General: Abdomen is flat. Bowel sounds are normal. There is no distension.      Palpations: Abdomen is soft.      Tenderness: There is no abdominal tenderness.   Musculoskeletal:         General: Tenderness present.      Right lower leg: No edema.      Left lower leg: No edema.      Comments: Pt had some tenderness on his Left wrist near the tendons of the snuff box. Pt has increased tenderness when thumbs was passively extended.   Skin:     General: Skin is warm and dry. "      Findings: No lesion or rash.   Neurological:      General: No focal deficit present.      Mental Status: He is alert and oriented to person, place, and time.      Motor: No weakness.   Psychiatric:         Mood and Affect: Mood normal.         Speech: Speech normal.         Behavior: Behavior normal. Behavior is cooperative.         Thought Content: Thought content normal.         Judgment: Judgment normal.       Assessment & Plan:   Jeremy ARORA was seen today for annual exam.    Diagnoses and all orders for this visit:    Essential hypertension  -     amLODIPine (NORVASC) 10 MG tablet; Take 1 tablet (10 mg total) by mouth once daily.  -     CBC Auto Differential; Future  -     Comprehensive Metabolic Panel; Future  -     Lipid Panel; Future  -     lisinopriL (PRINIVIL,ZESTRIL) 40 MG tablet; Take 1 tablet (40 mg total) by mouth once daily.  - Pt has been having continued elevated BP. Decided to add a second medication on.    Tenosynovitis, de Quervain  -     Ambulatory referral/consult to Orthopedics; Future    Screening PSA (prostate specific antigen)  -     PSA, Screening; Future    Hyponatremia  -     lisinopriL (PRINIVIL,ZESTRIL) 40 MG tablet; Take 1 tablet (40 mg total) by mouth once daily.  - Avoid diuretics due to episodes of Hyponatremia    Other orders   -Pt stated he wanted to get the flu and covid shot today, but did not want any other vaccines at this time.        Patient seen and plan of care discussed with Dr. Abbott     RTGLORIA in 4 weeks for a BP check, then follow up in a year if BP is in a good range and no other complaints    Ulices Amato DO, PGY1  [facility]

## 2022-10-11 DIAGNOSIS — R52 PAIN: Primary | ICD-10-CM

## 2022-10-12 ENCOUNTER — HOSPITAL ENCOUNTER (OUTPATIENT)
Dept: RADIOLOGY | Facility: OTHER | Age: 67
Discharge: HOME | End: 2022-10-12
Attending: PLASTIC SURGERY
Payer: MEDICARE

## 2022-10-12 ENCOUNTER — OFFICE VISIT (OUTPATIENT)
Dept: ORTHOPEDICS | Facility: CLINIC | Age: 67
End: 2022-10-12
Payer: MEDICARE

## 2022-10-12 VITALS
DIASTOLIC BLOOD PRESSURE: 79 MMHG | WEIGHT: 158.75 LBS | HEIGHT: 66 IN | HEART RATE: 77 BPM | BODY MASS INDEX: 25.51 KG/M2 | SYSTOLIC BLOOD PRESSURE: 146 MMHG

## 2022-10-12 DIAGNOSIS — R52 PAIN: ICD-10-CM

## 2022-10-12 DIAGNOSIS — M65.4 DE QUERVAIN'S TENOSYNOVITIS, LEFT: ICD-10-CM

## 2022-10-12 PROCEDURE — 4010F ACE/ARB THERAPY RXD/TAKEN: CPT | Mod: CPTII,S$GLB,, | Performed by: PLASTIC SURGERY

## 2022-10-12 PROCEDURE — 3078F PR MOST RECENT DIASTOLIC BLOOD PRESSURE < 80 MM HG: ICD-10-PCS | Mod: CPTII,S$GLB,, | Performed by: PLASTIC SURGERY

## 2022-10-12 PROCEDURE — 4010F PR ACE/ARB THEARPY RXD/TAKEN: ICD-10-PCS | Mod: CPTII,S$GLB,, | Performed by: PLASTIC SURGERY

## 2022-10-12 PROCEDURE — 73130 XR HAND COMPLETE 3 VIEW LEFT: ICD-10-PCS | Mod: 26,LT,, | Performed by: RADIOLOGY

## 2022-10-12 PROCEDURE — 73130 X-RAY EXAM OF HAND: CPT | Mod: 26,LT,, | Performed by: RADIOLOGY

## 2022-10-12 PROCEDURE — 99203 PR OFFICE/OUTPT VISIT, NEW, LEVL III, 30-44 MIN: ICD-10-PCS | Mod: 25,S$GLB,, | Performed by: PLASTIC SURGERY

## 2022-10-12 PROCEDURE — 1101F PR PT FALLS ASSESS DOC 0-1 FALLS W/OUT INJ PAST YR: ICD-10-PCS | Mod: CPTII,S$GLB,, | Performed by: PLASTIC SURGERY

## 2022-10-12 PROCEDURE — 99999 PR PBB SHADOW E&M-EST. PATIENT-LVL III: ICD-10-PCS | Mod: PBBFAC,,, | Performed by: PLASTIC SURGERY

## 2022-10-12 PROCEDURE — 3077F SYST BP >= 140 MM HG: CPT | Mod: CPTII,S$GLB,, | Performed by: PLASTIC SURGERY

## 2022-10-12 PROCEDURE — 73130 X-RAY EXAM OF HAND: CPT | Mod: TC,FY,LT

## 2022-10-12 PROCEDURE — 73110 XR WRIST COMPLETE 3 VIEWS LEFT: ICD-10-PCS | Mod: 26,LT,, | Performed by: RADIOLOGY

## 2022-10-12 PROCEDURE — 20550 PR INJECT TENDON SHEATH/LIGAMENT: ICD-10-PCS | Mod: LT,S$GLB,, | Performed by: PLASTIC SURGERY

## 2022-10-12 PROCEDURE — 3288F FALL RISK ASSESSMENT DOCD: CPT | Mod: CPTII,S$GLB,, | Performed by: PLASTIC SURGERY

## 2022-10-12 PROCEDURE — 3008F BODY MASS INDEX DOCD: CPT | Mod: CPTII,S$GLB,, | Performed by: PLASTIC SURGERY

## 2022-10-12 PROCEDURE — 1159F MED LIST DOCD IN RCRD: CPT | Mod: CPTII,S$GLB,, | Performed by: PLASTIC SURGERY

## 2022-10-12 PROCEDURE — 73110 X-RAY EXAM OF WRIST: CPT | Mod: TC,FY,LT

## 2022-10-12 PROCEDURE — 99999 PR PBB SHADOW E&M-EST. PATIENT-LVL III: CPT | Mod: PBBFAC,,, | Performed by: PLASTIC SURGERY

## 2022-10-12 PROCEDURE — 1125F AMNT PAIN NOTED PAIN PRSNT: CPT | Mod: CPTII,S$GLB,, | Performed by: PLASTIC SURGERY

## 2022-10-12 PROCEDURE — 3288F PR FALLS RISK ASSESSMENT DOCUMENTED: ICD-10-PCS | Mod: CPTII,S$GLB,, | Performed by: PLASTIC SURGERY

## 2022-10-12 PROCEDURE — 1159F PR MEDICATION LIST DOCUMENTED IN MEDICAL RECORD: ICD-10-PCS | Mod: CPTII,S$GLB,, | Performed by: PLASTIC SURGERY

## 2022-10-12 PROCEDURE — 99203 OFFICE O/P NEW LOW 30 MIN: CPT | Mod: 25,S$GLB,, | Performed by: PLASTIC SURGERY

## 2022-10-12 PROCEDURE — 1101F PT FALLS ASSESS-DOCD LE1/YR: CPT | Mod: CPTII,S$GLB,, | Performed by: PLASTIC SURGERY

## 2022-10-12 PROCEDURE — 20550 NJX 1 TENDON SHEATH/LIGAMENT: CPT | Mod: LT,S$GLB,, | Performed by: PLASTIC SURGERY

## 2022-10-12 PROCEDURE — 3077F PR MOST RECENT SYSTOLIC BLOOD PRESSURE >= 140 MM HG: ICD-10-PCS | Mod: CPTII,S$GLB,, | Performed by: PLASTIC SURGERY

## 2022-10-12 PROCEDURE — 73110 X-RAY EXAM OF WRIST: CPT | Mod: 26,LT,, | Performed by: RADIOLOGY

## 2022-10-12 PROCEDURE — 3008F PR BODY MASS INDEX (BMI) DOCUMENTED: ICD-10-PCS | Mod: CPTII,S$GLB,, | Performed by: PLASTIC SURGERY

## 2022-10-12 PROCEDURE — 1125F PR PAIN SEVERITY QUANTIFIED, PAIN PRESENT: ICD-10-PCS | Mod: CPTII,S$GLB,, | Performed by: PLASTIC SURGERY

## 2022-10-12 PROCEDURE — 3078F DIAST BP <80 MM HG: CPT | Mod: CPTII,S$GLB,, | Performed by: PLASTIC SURGERY

## 2022-10-12 RX ORDER — TRIAMCINOLONE ACETONIDE 40 MG/ML
40 INJECTION, SUSPENSION INTRA-ARTICULAR; INTRAMUSCULAR
Status: COMPLETED | OUTPATIENT
Start: 2022-10-12 | End: 2022-10-12

## 2022-10-12 RX ADMIN — TRIAMCINOLONE ACETONIDE 40 MG: 40 INJECTION, SUSPENSION INTRA-ARTICULAR; INTRAMUSCULAR at 11:10

## 2022-10-12 NOTE — PROGRESS NOTES
Chief Complaint: Pain of the Left Wrist      HPI:    Jeremy Matrinez is a right hand dominant 67 y.o. male presenting today for L left radial sided wrist pain.  The patient states that he was recently working in the [employer] when he began to experience pain on the  radial aspect of his  left wrist.   He complains of swelling and tenderness.  He also complains of difficulty with moving the thumb without eliciting pain.  He denies any recent history of trauma.  He has no other complaints no numbness or tingling  Past Medical History:   Diagnosis Date    Erectile dysfunction     GERD (gastroesophageal reflux disease)     Hyperlipidemia     Hypertension        Past Surgical History:   Procedure Laterality Date    COLONOSCOPY N/A 2017    Procedure: COLONOSCOPY;  Surgeon: AMBER Restrepo MD;  Location: [facility] ([address] [place] [address]);  Service: Endoscopy;  Laterality: N/A;    TOTAL KNEE ARTHROPLASTY  2010    right        Family History   Problem Relation Age of Onset    Diabetes Mother     Stroke Father     Kidney disease Son         transplant    Cancer Neg Hx     Colon cancer Neg Hx     Colon polyps Neg Hx     Esophageal cancer Neg Hx     Stomach cancer Neg Hx     Rectal cancer Neg Hx     Ulcerative colitis Neg Hx     Crohn's disease Neg Hx        Social History     Socioeconomic History    Marital status:    Tobacco Use    Smoking status: Former     Years: 10.00     Types: Cigarettes     Quit date: 2000     Years since quittin.2    Smokeless tobacco: Never   Substance and Sexual Activity    Alcohol use: Yes     Comment: beers 5 d per week, 3 at a time    Drug use: No   Social History Narrative     (in gen healthy), 5 kids. 4 grandkids. 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 No formal exercise but active. 1 son living with him.       Review of patient's allergies indicates:   Allergen Reactions    Amoxicillin Hives and Rash         Current Outpatient Medications:     acetaminophen (TYLENOL) 325 MG tablet,  "Take 325 mg by mouth every 6 (six) hours as needed for Pain., Disp: , Rfl:     amLODIPine (NORVASC) 10 MG tablet, Take 1 tablet (10 mg total) by mouth once daily., Disp: 90 tablet, Rfl: 11    clotrimazole-betamethasone 1-0.05% (LOTRISONE) cream, Apply topically 2 (two) times daily. (Patient not taking: No sig reported), Disp: 45 g, Rfl: 0    lisinopriL (PRINIVIL,ZESTRIL) 40 MG tablet, Take 1 tablet (40 mg total) by mouth once daily., Disp: 90 tablet, Rfl: 11    sildenafiL (VIAGRA) 100 MG tablet, Take 1 tablet (100 mg total) by mouth as needed for Erectile Dysfunction., Disp: 12 tablet, Rfl: 1    Current Facility-Administered Medications:     [COMPLETED] triamcinolone acetonide injection 40 mg, 40 mg, Other, 1 time in Clinic/HOD, Jer Briggs Jr., MD, 40 mg at 10/12/22 1130        Review of Systems:  Constitutional: no fever or chills  ENT: no nasal congestion or sore throat  Respiratory: no cough or shortness of breath  Cardiovascular: no chest pain or palpitations  Gastrointestinal: no nausea or vomiting, PUD, GERD, NSAID intolerance  Genitourinary: no hematuria or dysuria  Integument/Breast: no rash or pruritis  Hematologic/Lymphatic: no easy bruising or lymphadenopathy  Musculoskeletal: see HPI  Neurological: no seizures or tremors  Behavioral/Psych: no auditory or visual hallucinations      Objective:      PHYSICAL EXAM:  Vitals:    10/12/22 1102   BP: (!) 146/79   Pulse: 77   Weight: 72 kg (158 lb 11.7 oz)   Height: 5' 6" (1.676 m)   PainSc:   5     General Appearance: WDWN, NAD  Neuro/Psych: Mood & affect appropriate  Lungs: Respirations equal and unlabored.   CV: No apparent CV dysfunction, distal pulses intact, RRR  Skin: Intact throughout  Extremities:   Left Hand Exam     Tenderness   Left hand tenderness location: tenderness and swelling over the 1st dorsal compartment.     Range of Motion   The patient has normal left wrist ROM.  Hand   MP Thumb: normal   DIP Thumb: normal     Tests "   Finkelstein's test: positive    Other   Erythema: absent  Scars: absent  Sensation: normal  Pulse: present    Comments:    No tenderness to compression over the CMC joint there is moderate swelling over the 1st dorsal compartment and radial styloid            DIAGNOSTICS/IMAGING:    Radiologist's Impression:  XR HAND COMPLETE 3 VIEW LEFT     CLINICAL HISTORY:  . Pain, unspecified     TECHNIQUE:  PA, lateral, and oblique views of the left hand were performed.     COMPARISON:  None     FINDINGS:  No acute fracture or dislocation seen.  Advanced degenerative change 1st CMC joint.  Degenerative change in the PIP joints.     Radiopaque retained foreign body along the volar aspect of the PIP joint 4th digit measures 1 mm..     Impression:     Please see above.          Comments: I have personnaly reviewed the imaging and I agree with the above radiologist's report.    I have explained the risks, benefits, and alternatives of the procedure in detail.  The patient voices understanding and all questions have been answered.  The patient agrees to proceed as planned. After a sterile prep of the skin in the normal the left 1st dorsal compartment is injected from the radial approach using a 25 gauge needle with a combination of 1cc 1% plain xylocaine and 40mg of Kenalog.  The patient is cautioned and immediate relief of pain is secondary to the local anesthetic and will be temporary.  After the anesthetic wears off there may be a increase in pain that may last for a few hours or a few days and they should use ice to help alleviate this flair up of pain.         Assessment:     Encounter Diagnosis   Name Primary?    De Quervain's tenosynovitis, left         Plan/Discussion:   Jeremy ARORA was seen today for pain.    Diagnoses and all orders for this visit:    De Quervain's tenosynovitis, left  -     Ambulatory referral/consult to Orthopedics    Other orders  -     triamcinolone acetonide injection 40 mg       Based on the patient's  presenting symptoms and physical exam he has left de Quervain tenosynovitis.  In addition he was found to have moderate advance degenerative changes of the CMC joint of the left thumb.  On exam he is not tender within the CMC joint and his tenderness is located over the 1st dorsal compartment consistent with de Quervain.  I discussed the pathophysiology progression treatment of this condition and he was offered a corticosteroid injection into the 1st dorsal compartment to help alleviate his symptoms.  He was advised to return in 8 weeks if his symptoms fail to improve worsen otherwise he may follow up p.r.n.

## 2022-10-28 ENCOUNTER — TELEPHONE (OUTPATIENT)
Dept: INTERNAL MEDICINE | Facility: CLINIC | Age: 67
End: 2022-10-28
Payer: MEDICARE

## 2023-01-17 ENCOUNTER — OFFICE VISIT (OUTPATIENT)
Dept: INTERNAL MEDICINE | Facility: CLINIC | Age: 68
End: 2023-01-17
Payer: MEDICARE

## 2023-01-17 VITALS
HEART RATE: 85 BPM | OXYGEN SATURATION: 100 % | HEIGHT: 66 IN | SYSTOLIC BLOOD PRESSURE: 134 MMHG | BODY MASS INDEX: 24.17 KG/M2 | WEIGHT: 150.38 LBS | DIASTOLIC BLOOD PRESSURE: 70 MMHG

## 2023-01-17 DIAGNOSIS — K21.9 GASTROESOPHAGEAL REFLUX DISEASE, UNSPECIFIED WHETHER ESOPHAGITIS PRESENT: Primary | ICD-10-CM

## 2023-01-17 PROCEDURE — 3078F PR MOST RECENT DIASTOLIC BLOOD PRESSURE < 80 MM HG: ICD-10-PCS | Mod: HCNC,CPTII,S$GLB, | Performed by: INTERNAL MEDICINE

## 2023-01-17 PROCEDURE — 3075F PR MOST RECENT SYSTOLIC BLOOD PRESS GE 130-139MM HG: ICD-10-PCS | Mod: HCNC,CPTII,S$GLB, | Performed by: INTERNAL MEDICINE

## 2023-01-17 PROCEDURE — 1160F PR REVIEW ALL MEDS BY PRESCRIBER/CLIN PHARMACIST DOCUMENTED: ICD-10-PCS | Mod: HCNC,CPTII,S$GLB, | Performed by: INTERNAL MEDICINE

## 2023-01-17 PROCEDURE — 3008F BODY MASS INDEX DOCD: CPT | Mod: HCNC,CPTII,S$GLB, | Performed by: INTERNAL MEDICINE

## 2023-01-17 PROCEDURE — 1125F AMNT PAIN NOTED PAIN PRSNT: CPT | Mod: HCNC,CPTII,S$GLB, | Performed by: INTERNAL MEDICINE

## 2023-01-17 PROCEDURE — 3078F DIAST BP <80 MM HG: CPT | Mod: HCNC,CPTII,S$GLB, | Performed by: INTERNAL MEDICINE

## 2023-01-17 PROCEDURE — 99213 OFFICE O/P EST LOW 20 MIN: CPT | Mod: HCNC,S$GLB,, | Performed by: INTERNAL MEDICINE

## 2023-01-17 PROCEDURE — 1159F MED LIST DOCD IN RCRD: CPT | Mod: HCNC,CPTII,S$GLB, | Performed by: INTERNAL MEDICINE

## 2023-01-17 PROCEDURE — 3075F SYST BP GE 130 - 139MM HG: CPT | Mod: HCNC,CPTII,S$GLB, | Performed by: INTERNAL MEDICINE

## 2023-01-17 PROCEDURE — 1159F PR MEDICATION LIST DOCUMENTED IN MEDICAL RECORD: ICD-10-PCS | Mod: HCNC,CPTII,S$GLB, | Performed by: INTERNAL MEDICINE

## 2023-01-17 PROCEDURE — 1160F RVW MEDS BY RX/DR IN RCRD: CPT | Mod: HCNC,CPTII,S$GLB, | Performed by: INTERNAL MEDICINE

## 2023-01-17 PROCEDURE — 99999 PR PBB SHADOW E&M-EST. PATIENT-LVL IV: ICD-10-PCS | Mod: PBBFAC,HCNC,, | Performed by: INTERNAL MEDICINE

## 2023-01-17 PROCEDURE — 99213 PR OFFICE/OUTPT VISIT, EST, LEVL III, 20-29 MIN: ICD-10-PCS | Mod: HCNC,S$GLB,, | Performed by: INTERNAL MEDICINE

## 2023-01-17 PROCEDURE — 99999 PR PBB SHADOW E&M-EST. PATIENT-LVL IV: CPT | Mod: PBBFAC,HCNC,, | Performed by: INTERNAL MEDICINE

## 2023-01-17 PROCEDURE — 3008F PR BODY MASS INDEX (BMI) DOCUMENTED: ICD-10-PCS | Mod: HCNC,CPTII,S$GLB, | Performed by: INTERNAL MEDICINE

## 2023-01-17 PROCEDURE — 1125F PR PAIN SEVERITY QUANTIFIED, PAIN PRESENT: ICD-10-PCS | Mod: HCNC,CPTII,S$GLB, | Performed by: INTERNAL MEDICINE

## 2023-01-17 RX ORDER — OMEPRAZOLE 40 MG/1
40 CAPSULE, DELAYED RELEASE ORAL DAILY
Qty: 90 CAPSULE | Refills: 0 | Status: SHIPPED | OUTPATIENT
Start: 2023-01-17 | End: 2023-03-03 | Stop reason: SDUPTHER

## 2023-01-17 NOTE — PROGRESS NOTES
"Subjective:       Patient ID: Jeremy Martinez is a 67 y.o. male.    Chief Complaint: Gastroesophageal Reflux  This is a 67-year-old who presents today with reflux symptoms reports he is had before and has not been taking Prilosec although he has used that in the past he reports that went on a cruise recently but even before that he was having bit of burning and increased reflux belching and gas patient reports he gets some relief with milk but sometimes certain foods will aggravate his stomach he denies any diarrhea or blood in his stool he did take some Pepto-Bismol and Tums which helped with the symptoms but they continued to happen intermittently.  He denies any pain today  Does get constipation intermittently and is taking Metamucil which usually helps him with his bowels.  He denies any weakness or dizziness  He is not been taking any anti-inflammatories regularly     Gastroesophageal Reflux  Pertinent negatives include no fatigue.   Review of Systems   Constitutional:  Negative for fatigue and fever.   Respiratory:  Negative for shortness of breath.    Gastrointestinal:         Reflux burning at times  No vomiting or nausea   Occasional cramping constipation ueses metamucil      Objective:    Blood pressure 134/70, pulse 85, height 5' 6" (1.676 m), weight 68.2 kg (150 lb 5.7 oz), SpO2 100 %.   Physical Exam  Constitutional:       General: He is not in acute distress.  HENT:      Head: Normocephalic.      Mouth/Throat:      Pharynx: Oropharynx is clear.   Eyes:      General: No scleral icterus.  Cardiovascular:      Rate and Rhythm: Normal rate and regular rhythm.      Heart sounds: Normal heart sounds. No murmur heard.    No friction rub. No gallop.   Pulmonary:      Effort: Pulmonary effort is normal. No respiratory distress.      Breath sounds: Normal breath sounds.   Abdominal:      General: Bowel sounds are normal.      Palpations: Abdomen is soft. There is no mass.      Tenderness: There is no abdominal " tenderness.   Musculoskeletal:      Cervical back: Neck supple.   Skin:     Findings: No erythema.   Neurological:      Mental Status: He is alert.   Psychiatric:         Mood and Affect: Mood normal.       Assessment:       1. Gastroesophageal reflux disease, unspecified whether esophagitis present          Plan:       Jeremy ARORA was seen today for gastroesophageal reflux.    Diagnoses and all orders for this visit:    Gastroesophageal reflux disease, unspecified whether esophagitis present  -     Ambulatory referral/consult to Gastroenterology; Future  -     omeprazole (PRILOSEC) 40 MG capsule; Take 1 capsule (40 mg total) by mouth once daily.  Discussed with patient will start omeprazole then he can use Pepcid or Tums if needed for breakthrough symptoms we discussed dietary measures bland diet and advance as tolerated avoidance of anti-inflammatory medications and minimize alcohol    Referral placed for GI if no improvement or increased concerns follow-up with PCP as recommended

## 2023-01-25 ENCOUNTER — TELEPHONE (OUTPATIENT)
Dept: GASTROENTEROLOGY | Facility: CLINIC | Age: 68
End: 2023-01-25
Payer: MEDICARE

## 2023-01-25 NOTE — TELEPHONE ENCOUNTER
Contact and spoke with patient. Schedule patient appointment with Fellow Dr. Ludwig. Patient verbalized understanding.

## 2023-01-25 NOTE — TELEPHONE ENCOUNTER
----- Message from Josie Lemus sent at 1/25/2023  8:30 AM CST -----  Jeremy Martinez calling regarding Appointment Access  from referral for #GERD (Acid Reflux); Dysphagia, burning in the stomach, call back [phone]

## 2023-02-07 ENCOUNTER — TELEPHONE (OUTPATIENT)
Dept: GASTROENTEROLOGY | Facility: CLINIC | Age: 68
End: 2023-02-07
Payer: MEDICARE

## 2023-02-07 DIAGNOSIS — Z00.00 ENCOUNTER FOR MEDICARE ANNUAL WELLNESS EXAM: ICD-10-CM

## 2023-02-07 NOTE — TELEPHONE ENCOUNTER
LVM for the patient that we do not have any appts sooner that the one that he has on March 8/2023        ----- Message from Anabela Yanes sent at 2/7/2023  1:43 PM CST -----  Contact: Patient wif  Type: Appointment Request please do not cancel [facility] if no appointment       Name of Caller:Patient   Would the patient rather a call back or a response via [facility]? Call back   Best Call Back Number:[phone]  Additional Information: Please assist

## 2023-02-09 DIAGNOSIS — Z00.00 ENCOUNTER FOR MEDICARE ANNUAL WELLNESS EXAM: ICD-10-CM

## 2023-02-19 ENCOUNTER — HOSPITAL ENCOUNTER (INPATIENT)
Facility: HOSPITAL | Age: 68
LOS: 3 days | Discharge: HOME | DRG: 842 | End: 2023-02-22
Attending: HOSPITALIST | Admitting: INTERNAL MEDICINE
Payer: MEDICARE

## 2023-02-19 DIAGNOSIS — K22.89 ESOPHAGEAL MASS: Primary | ICD-10-CM

## 2023-02-19 LAB
FERRITIN SERPL-MCNC: 235 NG/ML (ref 20–300)
IRON SERPL-MCNC: 27 UG/DL (ref 45–160)
SATURATED IRON: 12 % (ref 20–50)
TOTAL IRON BINDING CAPACITY: 218 UG/DL (ref 250–450)
TRANSFERRIN SERPL-MCNC: 147 MG/DL (ref 200–375)

## 2023-02-19 PROCEDURE — 99222 PR INITIAL HOSPITAL CARE,LEVL II: ICD-10-PCS | Mod: AI,HCNC,, | Performed by: INTERNAL MEDICINE

## 2023-02-19 PROCEDURE — 36415 COLL VENOUS BLD VENIPUNCTURE: CPT | Mod: HCNC | Performed by: INTERNAL MEDICINE

## 2023-02-19 PROCEDURE — 20600001 HC STEP DOWN PRIVATE ROOM: Mod: HCNC

## 2023-02-19 PROCEDURE — 25000003 PHARM REV CODE 250: Mod: HCNC | Performed by: HOSPITALIST

## 2023-02-19 PROCEDURE — 99222 1ST HOSP IP/OBS MODERATE 55: CPT | Mod: AI,HCNC,, | Performed by: INTERNAL MEDICINE

## 2023-02-19 PROCEDURE — 82728 ASSAY OF FERRITIN: CPT | Mod: HCNC | Performed by: INTERNAL MEDICINE

## 2023-02-19 PROCEDURE — 84466 ASSAY OF TRANSFERRIN: CPT | Mod: HCNC | Performed by: INTERNAL MEDICINE

## 2023-02-19 PROCEDURE — 63600175 PHARM REV CODE 636 W HCPCS: Mod: HCNC | Performed by: INTERNAL MEDICINE

## 2023-02-19 PROCEDURE — 25000003 PHARM REV CODE 250: Mod: HCNC | Performed by: INTERNAL MEDICINE

## 2023-02-19 RX ORDER — DEXTROSE 40 %
30 GEL (GRAM) ORAL
Status: DISCONTINUED | OUTPATIENT
Start: 2023-02-19 | End: 2023-02-22 | Stop reason: HOSPADM

## 2023-02-19 RX ORDER — TALC
6 POWDER (GRAM) TOPICAL NIGHTLY PRN
Status: DISCONTINUED | OUTPATIENT
Start: 2023-02-19 | End: 2023-02-22 | Stop reason: HOSPADM

## 2023-02-19 RX ORDER — HYDROCODONE BITARTRATE AND ACETAMINOPHEN 5; 325 MG/1; MG/1
1 TABLET ORAL EVERY 6 HOURS PRN
Status: DISCONTINUED | OUTPATIENT
Start: 2023-02-19 | End: 2023-02-22 | Stop reason: HOSPADM

## 2023-02-19 RX ORDER — LISINOPRIL 20 MG/1
40 TABLET ORAL DAILY
Status: DISCONTINUED | OUTPATIENT
Start: 2023-02-19 | End: 2023-02-22 | Stop reason: HOSPADM

## 2023-02-19 RX ORDER — DEXTROSE 40 %
15 GEL (GRAM) ORAL
Status: DISCONTINUED | OUTPATIENT
Start: 2023-02-19 | End: 2023-02-22 | Stop reason: HOSPADM

## 2023-02-19 RX ORDER — GLUCAGON 1 MG
1 KIT INJECTION
Status: DISCONTINUED | OUTPATIENT
Start: 2023-02-19 | End: 2023-02-22 | Stop reason: HOSPADM

## 2023-02-19 RX ORDER — SODIUM CHLORIDE 0.9 % (FLUSH) 0.9 %
10 SYRINGE (ML) INJECTION EVERY 12 HOURS PRN
Status: DISCONTINUED | OUTPATIENT
Start: 2023-02-19 | End: 2023-02-22 | Stop reason: HOSPADM

## 2023-02-19 RX ORDER — HYDROMORPHONE HYDROCHLORIDE 1 MG/ML
1 INJECTION, SOLUTION INTRAMUSCULAR; INTRAVENOUS; SUBCUTANEOUS EVERY 6 HOURS PRN
Status: DISCONTINUED | OUTPATIENT
Start: 2023-02-19 | End: 2023-02-22 | Stop reason: HOSPADM

## 2023-02-19 RX ORDER — NALOXONE HCL 0.4 MG/ML
0.02 VIAL (ML) INJECTION
Status: DISCONTINUED | OUTPATIENT
Start: 2023-02-19 | End: 2023-02-22 | Stop reason: HOSPADM

## 2023-02-19 RX ORDER — ONDANSETRON 2 MG/ML
4 INJECTION INTRAMUSCULAR; INTRAVENOUS EVERY 8 HOURS PRN
Status: DISCONTINUED | OUTPATIENT
Start: 2023-02-19 | End: 2023-02-22 | Stop reason: HOSPADM

## 2023-02-19 RX ORDER — PANTOPRAZOLE SODIUM 40 MG/1
40 TABLET, DELAYED RELEASE ORAL
Status: DISCONTINUED | OUTPATIENT
Start: 2023-02-19 | End: 2023-02-22 | Stop reason: HOSPADM

## 2023-02-19 RX ADMIN — LISINOPRIL 40 MG: 20 TABLET ORAL at 12:02

## 2023-02-19 RX ADMIN — ONDANSETRON 4 MG: 2 INJECTION INTRAMUSCULAR; INTRAVENOUS at 02:02

## 2023-02-19 RX ADMIN — Medication 6 MG: at 09:02

## 2023-02-19 RX ADMIN — PANTOPRAZOLE SODIUM 40 MG: 40 TABLET, DELAYED RELEASE ORAL at 12:02

## 2023-02-19 RX ADMIN — HYDROMORPHONE HYDROCHLORIDE 1 MG: 1 INJECTION, SOLUTION INTRAMUSCULAR; INTRAVENOUS; SUBCUTANEOUS at 02:02

## 2023-02-19 NOTE — H&P
[facility]    History & Physical      Patient Name: Jeremy Martinez  MRN: [id number]  Admission Date: 2/19/2023  Attending Physician: Abilio Macias MD   Primary Care Provider: Jose Abbott MD         Patient information was obtained from patient and ER records.     Subjective:     Principal Problem:Esophageal mass    Chief Complaint:  Worsening epigastric pain for 1 day     HPI:  67-year-old gentleman past medical history of hypertension, hyperlipidemia and GERD with of a couple months of epigastric pain was well until yesterday he had excruciating pain to the epigastrium associated with 1 episode of vomiting.  He went to the emergency room and underwent CT imaging which was suggestive of esophageal mass, he was then transferred to [facility] for further evaluation by specialist.  At time of my review patient had no complaints.  His previous hemoglobin around 12( microcytic anemia) yesterday was 9.9. No reports of rectal bleeding or hematemesis     Past Medical History:   Diagnosis Date    Erectile dysfunction     GERD (gastroesophageal reflux disease) 2010    Hyperlipidemia 2008    Hypertension 2007       Past Surgical History:   Procedure Laterality Date    COLONOSCOPY N/A 8/1/2017    Procedure: COLONOSCOPY;  Surgeon: ABMER Restrepo MD;  Location: [address];  Service: Endoscopy;  Laterality: N/A;    TOTAL KNEE ARTHROPLASTY  2010    right       Review of patient's allergies indicates:   Allergen Reactions    Amoxicillin Hives and Rash       Current Facility-Administered Medications on File Prior to Encounter   Medication    [COMPLETED] aluminum-magnesium hydroxide-simethicone 200-200-20 mg/5 mL suspension 30 mL    And    [COMPLETED] LIDOcaine HCl 2% oral solution 15 mL    [COMPLETED] HYDROmorphone injection 1 mg    [COMPLETED] iohexoL (OMNIPAQUE 350) injection 75 mL    [COMPLETED] lactated ringers bolus 1,000 mL    [COMPLETED] LORazepam injection 0.5 mg    [COMPLETED] morphine injection 4 mg      Current Outpatient Medications on File Prior to Encounter   Medication Sig    acetaminophen (TYLENOL) 325 MG tablet Take 325 mg by mouth every 6 (six) hours as needed for Pain.    lisinopriL (PRINIVIL,ZESTRIL) 40 MG tablet Take 1 tablet (40 mg total) by mouth once daily.    omeprazole (PRILOSEC) 40 MG capsule Take 1 capsule (40 mg total) by mouth once daily.    sildenafiL (VIAGRA) 100 MG tablet Take 1 tablet (100 mg total) by mouth as needed for Erectile Dysfunction.     Family History       Problem Relation (Age of Onset)    Diabetes Mother    Kidney disease Son    Stroke Father          Tobacco Use    Smoking status: Former     Years: 10.00     Types: Cigarettes     Quit date: 2000     Years since quittin.6    Smokeless tobacco: Never   Substance and Sexual Activity    Alcohol use: Yes     Comment: beers 5 d per week, 3 at a time    Drug use: No    Sexual activity: Not on file     Review of Systems   Constitutional:  Negative for activity change and appetite change.   Gastrointestinal:  Positive for abdominal pain and vomiting. Negative for diarrhea.   Objective:     Vital Signs (Most Recent):  Temp: 98 °F (36.7 °C) (23 1201)  Pulse: 84 (23 1201)  Resp: 18 (23 1201)  BP: 130/64 (23 1201)  SpO2: 98 % (23 1201) Vital Signs (24h Range):  Temp:  [96.7 °F (35.9 °C)-98 °F (36.7 °C)] 98 °F (36.7 °C)  Pulse:  [77-97] 84  Resp:  [11-19] 18  SpO2:  [97 %-100 %] 98 %  BP: (115-160)/(64-90) 130/64     Weight: 66 kg (145 lb 8.1 oz)  Body mass index is 23.48 kg/m².    Physical Exam  Constitutional:       General: He is not in acute distress.     Appearance: He is normal weight.   HENT:      Head: Normocephalic.      Right Ear: External ear normal.      Left Ear: External ear normal.      Nose: Nose normal.      Mouth/Throat:      Mouth: Mucous membranes are moist.   Eyes:      General: No scleral icterus.  Cardiovascular:      Rate and Rhythm: Normal rate.      Heart sounds: Normal  heart sounds.   Pulmonary:      Breath sounds: Normal breath sounds.   Abdominal:      Palpations: Abdomen is soft.      Tenderness: There is no abdominal tenderness.   Musculoskeletal:      Right lower leg: No edema.      Left lower leg: No edema.   Skin:     General: Skin is warm.   Neurological:      General: No focal deficit present.      Mental Status: He is alert and oriented to person, place, and time.   Psychiatric:         Mood and Affect: Mood normal.         Thought Content: Thought content normal.          Significant Labs: All pertinent labs within the past 24 hours have been reviewed.    Significant Imaging: I have reviewed all pertinent imaging results/findings within the past 24 hours.    Assessment/Plan:     Active Diagnoses:    Diagnosis Date Noted POA    PRINCIPAL PROBLEM:  Esophageal mass [K22.89] 02/19/2023 Unknown    Anemia [D64.9] 10/14/2013 Yes    Hypertension [I10]  Yes    GERD (gastroesophageal reflux disease) [K21.9]  Yes      Problems Resolved During this Admission:     VTE Risk Mitigation (From admission, onward)           Ordered     IP VTE LOW RISK PATIENT  Once         02/19/23 1212     Place sequential compression device  Until discontinued         02/19/23 1212                  Esophageal mass.  Clear liquid diet for now.  NPO after midnight.  GI consult.  Supportive care  History of GERD.  Continue Protonix  Microcytic anemia.  Monitor hemoglobin.  Iron studies  Hypertension.  Resume home antihypertensive    DVT prophylaxis.  SCDs.  Fall precautions    Wife updated at bedside    Abilio Macias MD  Department of Hospital Medicine   [facility]  [facility]

## 2023-02-20 ENCOUNTER — ANESTHESIA (OUTPATIENT)
Dept: ENDOSCOPY | Facility: HOSPITAL | Age: 68
DRG: 842 | End: 2023-02-20
Payer: MEDICARE

## 2023-02-20 ENCOUNTER — ANESTHESIA EVENT (OUTPATIENT)
Dept: ENDOSCOPY | Facility: HOSPITAL | Age: 68
DRG: 842 | End: 2023-02-20
Payer: MEDICARE

## 2023-02-20 LAB
ANION GAP SERPL CALC-SCNC: 10 MMOL/L (ref 8–16)
BASOPHILS # BLD AUTO: 0.03 K/UL (ref 0–0.2)
BASOPHILS NFR BLD: 0.6 % (ref 0–1.9)
BUN SERPL-MCNC: 7 MG/DL (ref 8–23)
CALCIUM SERPL-MCNC: 9.2 MG/DL (ref 8.7–10.5)
CHLORIDE SERPL-SCNC: 103 MMOL/L (ref 95–110)
CO2 SERPL-SCNC: 24 MMOL/L (ref 23–29)
CREAT SERPL-MCNC: 0.9 MG/DL (ref 0.5–1.4)
DIFFERENTIAL METHOD: ABNORMAL
EOSINOPHIL # BLD AUTO: 0.1 K/UL (ref 0–0.5)
EOSINOPHIL NFR BLD: 2.7 % (ref 0–8)
ERYTHROCYTE [DISTWIDTH] IN BLOOD BY AUTOMATED COUNT: 14.6 % (ref 11.5–14.5)
EST. GFR  (NO RACE VARIABLE): >60 ML/MIN/1.73 M^2
GLUCOSE SERPL-MCNC: 70 MG/DL (ref 70–110)
HCT VFR BLD AUTO: 28.8 % (ref 40–54)
HGB BLD-MCNC: 9.3 G/DL (ref 14–18)
IMM GRANULOCYTES # BLD AUTO: 0.01 K/UL (ref 0–0.04)
IMM GRANULOCYTES NFR BLD AUTO: 0.2 % (ref 0–0.5)
LYMPHOCYTES # BLD AUTO: 1.1 K/UL (ref 1–4.8)
LYMPHOCYTES NFR BLD: 21.7 % (ref 18–48)
MCH RBC QN AUTO: 23.9 PG (ref 27–31)
MCHC RBC AUTO-ENTMCNC: 32.3 G/DL (ref 32–36)
MCV RBC AUTO: 74 FL (ref 82–98)
MONOCYTES # BLD AUTO: 0.7 K/UL (ref 0.3–1)
MONOCYTES NFR BLD: 12.9 % (ref 4–15)
NEUTROPHILS # BLD AUTO: 3.2 K/UL (ref 1.8–7.7)
NEUTROPHILS NFR BLD: 61.9 % (ref 38–73)
NRBC BLD-RTO: 0 /100 WBC
PLATELET # BLD AUTO: 365 K/UL (ref 150–450)
PMV BLD AUTO: 8.9 FL (ref 9.2–12.9)
POTASSIUM SERPL-SCNC: 4.4 MMOL/L (ref 3.5–5.1)
RBC # BLD AUTO: 3.89 M/UL (ref 4.6–6.2)
SODIUM SERPL-SCNC: 137 MMOL/L (ref 136–145)
WBC # BLD AUTO: 5.21 K/UL (ref 3.9–12.7)

## 2023-02-20 PROCEDURE — 99223 1ST HOSP IP/OBS HIGH 75: CPT | Mod: 25,HCNC,, | Performed by: INTERNAL MEDICINE

## 2023-02-20 PROCEDURE — 25000003 PHARM REV CODE 250: Mod: HCNC | Performed by: NURSE ANESTHETIST, CERTIFIED REGISTERED

## 2023-02-20 PROCEDURE — 43237 PR ENDOSCOPIC US EXAM, ESOPH: ICD-10-PCS | Mod: HCNC,,, | Performed by: INTERNAL MEDICINE

## 2023-02-20 PROCEDURE — S5010 5% DEXTROSE AND 0.45% SALINE: HCPCS | Mod: HCNC | Performed by: INTERNAL MEDICINE

## 2023-02-20 PROCEDURE — 99223 PR INITIAL HOSPITAL CARE,LEVL III: ICD-10-PCS | Mod: 25,HCNC,, | Performed by: INTERNAL MEDICINE

## 2023-02-20 PROCEDURE — D9220A PRA ANESTHESIA: Mod: HCNC,CRNA,, | Performed by: NURSE ANESTHETIST, CERTIFIED REGISTERED

## 2023-02-20 PROCEDURE — 94761 N-INVAS EAR/PLS OXIMETRY MLT: CPT | Mod: HCNC

## 2023-02-20 PROCEDURE — 88365 INSITU HYBRIDIZATION (FISH): CPT | Mod: HCNC | Performed by: PATHOLOGY

## 2023-02-20 PROCEDURE — D9220A PRA ANESTHESIA: ICD-10-PCS | Mod: HCNC,CRNA,, | Performed by: NURSE ANESTHETIST, CERTIFIED REGISTERED

## 2023-02-20 PROCEDURE — 88305 TISSUE EXAM BY PATHOLOGIST: ICD-10-PCS | Mod: 26,HCNC,, | Performed by: PATHOLOGY

## 2023-02-20 PROCEDURE — 99232 PR SUBSEQUENT HOSPITAL CARE,LEVL II: ICD-10-PCS | Mod: HCNC,,, | Performed by: INTERNAL MEDICINE

## 2023-02-20 PROCEDURE — 88377 M/PHMTRC ALYS ISHQUANT/SEMIQ: CPT | Mod: HCNC | Performed by: PATHOLOGY

## 2023-02-20 PROCEDURE — D9220A PRA ANESTHESIA: Mod: HCNC,ANES,, | Performed by: ANESTHESIOLOGY

## 2023-02-20 PROCEDURE — 88360 PR  TUMOR IMMUNOHISTOCHEM/MANUAL: ICD-10-PCS | Mod: 26,HCNC,, | Performed by: PATHOLOGY

## 2023-02-20 PROCEDURE — 88365 INSITU HYBRIDIZATION (FISH): CPT | Mod: 26,HCNC,, | Performed by: PATHOLOGY

## 2023-02-20 PROCEDURE — 27201012 HC FORCEPS, HOT/COLD, DISP: Mod: HCNC | Performed by: INTERNAL MEDICINE

## 2023-02-20 PROCEDURE — 88342 IMHCHEM/IMCYTCHM 1ST ANTB: CPT | Mod: HCNC | Performed by: PATHOLOGY

## 2023-02-20 PROCEDURE — 25000003 PHARM REV CODE 250: Mod: HCNC | Performed by: HOSPITALIST

## 2023-02-20 PROCEDURE — 88341 IMHCHEM/IMCYTCHM EA ADD ANTB: CPT | Mod: HCNC | Performed by: PATHOLOGY

## 2023-02-20 PROCEDURE — 88365 PR  TISSUE HYBRIDIZATION: ICD-10-PCS | Mod: 26,HCNC,, | Performed by: PATHOLOGY

## 2023-02-20 PROCEDURE — 88342 IMHCHEM/IMCYTCHM 1ST ANTB: CPT | Mod: 91,HCNC | Performed by: PATHOLOGY

## 2023-02-20 PROCEDURE — 43237 ENDOSCOPIC US EXAM ESOPH: CPT | Mod: HCNC | Performed by: INTERNAL MEDICINE

## 2023-02-20 PROCEDURE — 43237 ENDOSCOPIC US EXAM ESOPH: CPT | Mod: HCNC,,, | Performed by: INTERNAL MEDICINE

## 2023-02-20 PROCEDURE — 88342 CHG IMMUNOCYTOCHEMISTRY: ICD-10-PCS | Mod: 26,HCNC,59, | Performed by: PATHOLOGY

## 2023-02-20 PROCEDURE — D9220A PRA ANESTHESIA: ICD-10-PCS | Mod: HCNC,ANES,, | Performed by: ANESTHESIOLOGY

## 2023-02-20 PROCEDURE — 43239 EGD BIOPSY SINGLE/MULTIPLE: CPT | Mod: 59,HCNC | Performed by: INTERNAL MEDICINE

## 2023-02-20 PROCEDURE — 20600001 HC STEP DOWN PRIVATE ROOM: Mod: HCNC

## 2023-02-20 PROCEDURE — 43239 EGD BIOPSY SINGLE/MULTIPLE: CPT | Mod: 59,HCNC,, | Performed by: INTERNAL MEDICINE

## 2023-02-20 PROCEDURE — 43239 PR EGD, FLEX, W/BIOPSY, SGL/MULTI: ICD-10-PCS | Mod: 59,HCNC,, | Performed by: INTERNAL MEDICINE

## 2023-02-20 PROCEDURE — 88360 TUMOR IMMUNOHISTOCHEM/MANUAL: CPT | Mod: 26,HCNC,, | Performed by: PATHOLOGY

## 2023-02-20 PROCEDURE — 63600175 PHARM REV CODE 636 W HCPCS: Mod: HCNC | Performed by: NURSE ANESTHETIST, CERTIFIED REGISTERED

## 2023-02-20 PROCEDURE — 37000008 HC ANESTHESIA 1ST 15 MINUTES: Mod: HCNC | Performed by: INTERNAL MEDICINE

## 2023-02-20 PROCEDURE — 88305 TISSUE EXAM BY PATHOLOGIST: CPT | Mod: 26,HCNC,, | Performed by: PATHOLOGY

## 2023-02-20 PROCEDURE — 99232 SBSQ HOSP IP/OBS MODERATE 35: CPT | Mod: HCNC,,, | Performed by: INTERNAL MEDICINE

## 2023-02-20 PROCEDURE — 85025 COMPLETE CBC W/AUTO DIFF WBC: CPT | Mod: HCNC | Performed by: INTERNAL MEDICINE

## 2023-02-20 PROCEDURE — 88341 PR IHC OR ICC EACH ADD'L SINGLE ANTIBODY  STAINPR: ICD-10-PCS | Mod: 26,HCNC,59, | Performed by: PATHOLOGY

## 2023-02-20 PROCEDURE — 25000003 PHARM REV CODE 250: Mod: HCNC | Performed by: INTERNAL MEDICINE

## 2023-02-20 PROCEDURE — 80048 BASIC METABOLIC PNL TOTAL CA: CPT | Mod: HCNC | Performed by: INTERNAL MEDICINE

## 2023-02-20 PROCEDURE — 88305 TISSUE EXAM BY PATHOLOGIST: CPT | Mod: HCNC | Performed by: PATHOLOGY

## 2023-02-20 PROCEDURE — 36415 COLL VENOUS BLD VENIPUNCTURE: CPT | Mod: HCNC | Performed by: INTERNAL MEDICINE

## 2023-02-20 PROCEDURE — 37000009 HC ANESTHESIA EA ADD 15 MINS: Mod: HCNC | Performed by: INTERNAL MEDICINE

## 2023-02-20 PROCEDURE — 88342 IMHCHEM/IMCYTCHM 1ST ANTB: CPT | Mod: 26,HCNC,59, | Performed by: PATHOLOGY

## 2023-02-20 PROCEDURE — 88341 IMHCHEM/IMCYTCHM EA ADD ANTB: CPT | Mod: 26,HCNC,59, | Performed by: PATHOLOGY

## 2023-02-20 RX ORDER — PROPOFOL 10 MG/ML
VIAL (ML) INTRAVENOUS
Status: DISCONTINUED | OUTPATIENT
Start: 2023-02-20 | End: 2023-02-20

## 2023-02-20 RX ORDER — SODIUM CHLORIDE 0.9 % (FLUSH) 0.9 %
3 SYRINGE (ML) INJECTION
Status: DISCONTINUED | OUTPATIENT
Start: 2023-02-20 | End: 2023-02-20 | Stop reason: HOSPADM

## 2023-02-20 RX ORDER — DEXTROSE MONOHYDRATE AND SODIUM CHLORIDE 5; .45 G/100ML; G/100ML
INJECTION, SOLUTION INTRAVENOUS CONTINUOUS
Status: ACTIVE | OUTPATIENT
Start: 2023-02-20 | End: 2023-02-20

## 2023-02-20 RX ORDER — PROPOFOL 10 MG/ML
VIAL (ML) INTRAVENOUS CONTINUOUS PRN
Status: DISCONTINUED | OUTPATIENT
Start: 2023-02-20 | End: 2023-02-20

## 2023-02-20 RX ORDER — LIDOCAINE HYDROCHLORIDE 20 MG/ML
INJECTION INTRAVENOUS
Status: DISCONTINUED | OUTPATIENT
Start: 2023-02-20 | End: 2023-02-20

## 2023-02-20 RX ADMIN — PROPOFOL 70 MG: 10 INJECTION, EMULSION INTRAVENOUS at 02:02

## 2023-02-20 RX ADMIN — HYDROCODONE BITARTRATE AND ACETAMINOPHEN 1 TABLET: 5; 325 TABLET ORAL at 10:02

## 2023-02-20 RX ADMIN — Medication 6 MG: at 10:02

## 2023-02-20 RX ADMIN — LISINOPRIL 40 MG: 20 TABLET ORAL at 08:02

## 2023-02-20 RX ADMIN — DEXTROSE AND SODIUM CHLORIDE: 5; 450 INJECTION, SOLUTION INTRAVENOUS at 09:02

## 2023-02-20 RX ADMIN — LIDOCAINE HYDROCHLORIDE 80 MG: 20 INJECTION INTRAVENOUS at 02:02

## 2023-02-20 RX ADMIN — Medication 150 MCG/KG/MIN: at 02:02

## 2023-02-20 RX ADMIN — SODIUM CHLORIDE: 9 INJECTION, SOLUTION INTRAVENOUS at 01:02

## 2023-02-20 NOTE — ANESTHESIA PREPROCEDURE EVALUATION
02/20/2023  Jeremy Martinez is a 67 y.o., male.      Pre-op Assessment    I have reviewed the Patient Summary Reports.       I have reviewed the Medications.     Review of Systems  Anesthesia Hx:  History of prior surgery of interest to airway management or planning:   Cardiovascular:   Hypertension hyperlipidemia    Hepatic/GI:   GERD GE junction mass       Physical Exam  General: Well nourished    Airway:  Mallampati: III / II  Mouth Opening: Normal  TM Distance: Normal  Tongue: Normal  Neck ROM: Normal ROM    Chest/Lungs:  Normal Respiratory Rate    Heart:  Rate: Normal        Anesthesia Plan  Type of Anesthesia, risks & benefits discussed:    Anesthesia Type: Gen ETT  Intra-op Monitoring Plan: Standard ASA Monitors  Post Op Pain Control Plan: multimodal analgesia  Induction:  IV  Informed Consent: Informed consent signed with the Patient and all parties understand the risks and agree with anesthesia plan.  All questions answered.   ASA Score: 3  Day of Surgery Review of History & Physical: H&P Update referred to the surgeon/provider.  Anesthesia Plan Notes: NPO confirmed.   No history of anesthesia problems.     Ready For Surgery From Anesthesia Perspective.     .

## 2023-02-20 NOTE — CONSULTS
"[facility]  Gastroenterology  Consult Note    Patient Name: Jeremy Martinez  MRN: [id number]  Admission Date: 2/19/2023  Hospital Length of Stay: 1 days  Code Status: Full Code   Attending Provider: Abilio Macias MD   Consulting Provider: Sergey Chew MD  Primary Care Physician: Jose Abbott MD  Principal Problem:Esophageal mass    Inpatient consult to [facility] ([facility])  Consult performed by: Sergey Chew MD  Consult ordered by: Abilio Macias MD        Subjective:     HPI:  67 M w/ PMH HTN, HLD, GERD on PPI daily admitted with dysphagia and epigastric pain for several months.  Patient reports he was seen by PCP approx 2 months ago for epigastric pain and GERD for which he was prescribed a PPI and plan for outpatient EGD for further evaluation.  He started PPI but his symptoms progressed starting 2 weeks prior to admission with worsening pain and symptoms of dysphagia with some solids and liquid retention.  He presented to ED for further evaluation.  In ED, patient was HDS and afebrile.  Labs notable for WBC 5.2, Hgb 9.3, iron deficiency, microcytosis, Plt 365, Cr 0.9.  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  AES consulted for further evaluation.      Past Medical History:   Diagnosis Date    Erectile dysfunction     GERD (gastroesophageal reflux disease) 2010    Hyperlipidemia 2008    Hypertension 2007       Past Surgical History:   Procedure Laterality Date    COLONOSCOPY N/A 8/1/2017    Procedure: COLONOSCOPY;  Surgeon: AMBER Restrepo MD;  Location: [address];  Service: Endoscopy;  Laterality: N/A;    TOTAL KNEE ARTHROPLASTY  2010    right       Review of patient's allergies indicates:   Allergen Reactions    Amoxicillin Hives and Rash     Family History       Problem Relation " (Age of Onset)    Diabetes Mother    Kidney disease Son    Stroke Father          Tobacco Use    Smoking status: Former     Years: 10.00     Types: Cigarettes     Quit date: 2000     Years since quittin.6    Smokeless tobacco: Never   Substance and Sexual Activity    Alcohol use: Yes     Comment: beers 5 d per week, 3 at a time    Drug use: No    Sexual activity: Not on file     Review of Systems   Constitutional:  Positive for appetite change, fatigue and unexpected weight change.   HENT:  Positive for trouble swallowing.    Eyes: Negative.    Respiratory: Negative.     Cardiovascular: Negative.    Gastrointestinal:  Positive for abdominal pain.   Endocrine: Negative.    Genitourinary: Negative.    Musculoskeletal: Negative.    Skin: Negative.    Allergic/Immunologic: Negative.    Neurological: Negative.    Hematological: Negative.    Psychiatric/Behavioral: Negative.     Objective:     Vital Signs (Most Recent):  Temp: 98.5 °F (36.9 °C) (23)  Pulse: 82 (23 07)  Resp: 18 (23 07)  BP: 131/80 (23 07)  SpO2: 100 % (23) Vital Signs (24h Range):  Temp:  [96.2 °F (35.7 °C)-98.5 °F (36.9 °C)] 98.5 °F (36.9 °C)  Pulse:  [82-91] 82  Resp:  [17-18] 18  SpO2:  [97 %-100 %] 100 %  BP: (127-156)/(64-91) 131/80     Weight: 66 kg (145 lb 8.1 oz) (23 1201)  Body mass index is 23.48 kg/m².    No intake or output data in the 24 hours ending 23 0830    Lines/Drains/Airways       Peripheral Intravenous Line  Duration                  Peripheral IV - Single Lumen 23 1130 20 G Right Antecubital 1 day                    Physical Exam    Gen: NAD, lying comfortably  HENT: NCAT, oropharynx clear  Eyes: anicteric sclerae, EOMI grossly  Neck: supple, no visible masses/goiter  Cardiac: RRR, no M/R/G, S1/S2 present  Lungs: CTAB, no crackles, no wheezes  Abd: soft, NT/ND, normoactive BS, no rebound  Ext: no LE edema, warm, well perfused  Skin: skin intact on exposed  body parts, no visible rashes, lesions  Neuro: A&Ox4, neuro exam grossly intact, moves all extremities  Psych: appropriate mood, affect      Significant Labs:  CBC:   Recent Labs   Lab 02/18/23 2202 02/20/23 0318   WBC 5.83 5.21   HGB 9.9* 9.3*   HCT 30.8* 28.8*    365     CMP:   Recent Labs   Lab 02/18/23 2202 02/20/23 0318   GLU 94 70   CALCIUM 9.4 9.2   ALBUMIN 3.5  --    PROT 7.1  --     137   K 3.7 4.4   CO2 22* 24    103   BUN 7* 7*   CREATININE 1.0 0.9   ALKPHOS 61  --    ALT 8*  --    AST 16  --    BILITOT 0.4  --      Coagulation: No results for input(s): PT, INR, APTT in the last 48 hours.    Significant Imaging:  Imaging results within the past 24 hours have been reviewed.    Assessment/Plan:     GI  * Esophageal mass  Patient with presenting symptoms epigastric pain and dysphagia with CT findings of gastric cardia/GE junction mass as well as likely peripancreatic lymphadenopathy.  Labs showing iron deficiency anemia.      Recommendations:  -EGD/EUS today for further evaluation  -trend CBC, CMP, INR  -keep NPO  -IV PPI BID        Thank you for your consult. I will follow-up with patient. Please contact us if you have any additional questions.    Sergey Chew MD  Gastroenterology  [facility]

## 2023-02-20 NOTE — PROVATION PATIENT INSTRUCTIONS
Discharge Summary/Instructions after an Endoscopic Procedure  Patient Name: Jeremy Martinez  Patient MRN: [id number]  Patient YOB: 1955  Monday, February 20, 2023  Ady Cornell MD  Dear patient,  As a result of recent federal legislation (The Federal Cures Act), you may   receive lab or pathology results from your procedure in your [facility]   account before your physician is able to contact you. Your physician or   their representative will relay the results to you with their   recommendations at their soonest availability.  Thank you,  RESTRICTIONS:  During your procedure today, you received medications for sedation.  These   medications may affect your judgment, balance and coordination.  Therefore,   for 24 hours, you have the following restrictions:   - DO NOT drive a car, operate machinery, make legal/financial decisions,   sign important papers or drink alcohol.    ACTIVITY:  Today: no heavy lifting, straining or running due to procedural   sedation/anesthesia.  The following day: return to full activity including work.  DIET:  Eat and drink normally unless instructed otherwise.     TREATMENT FOR COMMON SIDE EFFECTS:  - Mild abdominal pain, nausea, belching, bloating or excessive gas:  rest,   eat lightly and use a heating pad.  - Sore Throat: treat with throat lozenges and/or gargle with warm salt   water.  - Because air was used during the procedure, expelling large amounts of air   from your rectum or belching is normal.  - If a bowel prep was taken, you may not have a bowel movement for 1-3 days.    This is normal.  SYMPTOMS TO WATCH FOR AND REPORT TO YOUR PHYSICIAN:  1. Abdominal pain or bloating, other than gas cramps.  2. Chest pain.  3. Back pain.  4. Signs of infection such as: chills or fever occurring within 24 hours   after the procedure.  5. Rectal bleeding, which would show as bright red, maroon, or black stools.   (A tablespoon of blood from the rectum is not serious, especially if    hemorrhoids are present.)  6. Vomiting.  7. Weakness or dizziness.  GO DIRECTLY TO THE NEAREST EMERGENCY ROOM IF YOU HAVE ANY OF THE FOLLOWING:      Difficulty breathing              Chills and/or fever over 101 F   Persistent vomiting and/or vomiting blood   Severe abdominal pain   Severe chest pain   Black, tarry stools   Bleeding- more than one tablespoon   Any other symptom or condition that you feel may need urgent attention  Your doctor recommends these additional instructions:  If any biopsies were taken, your doctors clinic will contact you in 1 to 2   weeks with any results.  - Return patient to [facility] for ongoing care.   - Await path results.   - Recommend no more than a liquid diet for now.  - Recommend medical and surgical oncology consults pending final pathology.     For questions, problems or results please call your physician - Ady Cornell MD at Work:  [phone].  [facility], EMERGENCY ROOM PHONE NUMBER: [phone]  IF A COMPLICATION OR EMERGENCY SITUATION ARISES AND YOU ARE UNABLE TO REACH   YOUR PHYSICIAN - GO DIRECTLY TO THE EMERGENCY ROOM.  Ady Cornell MD  2/20/2023 3:06:22 PM  This report has been verified and signed electronically.  Dear patient,  As a result of recent federal legislation (The Federal Cures Act), you may   receive lab or pathology results from your procedure in your [facility]   account before your physician is able to contact you. Your physician or   their representative will relay the results to you with their   recommendations at their soonest availability.  Thank you,  PROVATION

## 2023-02-20 NOTE — NURSING TRANSFER
Nursing Transfer Note      2/20/2023     Reason patient is being transferred: post procedure     Transfer To: 1001    Transfer via stretcher    Transported by transport     Medicines sent: yes     Any special needs or follow-up needed: routine     Chart send with patient: Yes    Notified: spouse    Patient reassessed at: 1445 on 2/20

## 2023-02-20 NOTE — HPI
"67 M w/ PMH HTN, HLD, GERD on PPI daily admitted with dysphagia and epigastric pain for several months.  Patient reports he was seen by PCP approx 2 months ago for epigastric pain and GERD for which he was prescribed a PPI and plan for outpatient EGD for further evaluation.  He started PPI but his symptoms progressed starting 2 weeks prior to admission with worsening pain and symptoms of dysphagia with some solids and liquid retention.  He presented to ED for further evaluation.  In ED, patient was HDS and afebrile.  Labs notable for WBC 5.2, Hgb 9.3, iron deficiency, microcytosis, Plt 365, Cr 0.9.  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  AES consulted for further evaluation.  "

## 2023-02-20 NOTE — PROGRESS NOTES
[facility] Medicine  Progress Note    Patient Name: Jeremy Martinez  MRN: [id number]  Patient Class: IP- Inpatient   Admission Date: 2/19/2023  Length of Stay: 1 days  Attending Physician: Abilio Macias MD  Primary Care Provider: Jose Abbott MD        Subjective:     Principal Problem:Esophageal mass    Interval History: 67-year-old gentleman past medical history of hypertension, hyperlipidemia and GERD presented to the hospital with epigastric pain.  Patient was found to have an esophageal mass and was transferred to [facility] for specialist care.    No complaints or acute events overnight.  GI recommends getting advanced endoscopic consult.  Iron studies thus far suggestive of anemia of chronic disease      Review of Systems   All other systems reviewed and are negative.  Objective:     Vital Signs (Most Recent):  Temp: 98.5 °F (36.9 °C) (02/20/23 0713)  Pulse: 82 (02/20/23 0713)  Resp: 18 (02/20/23 0713)  BP: 131/80 (02/20/23 0713)  SpO2: 100 % (02/20/23 0713) Vital Signs (24h Range):  Temp:  [96.2 °F (35.7 °C)-98.5 °F (36.9 °C)] 98.5 °F (36.9 °C)  Pulse:  [82-91] 82  Resp:  [17-18] 18  SpO2:  [97 %-100 %] 100 %  BP: (127-156)/(64-91) 131/80     Weight: 66 kg (145 lb 8.1 oz)  Body mass index is 23.48 kg/m².  No intake or output data in the 24 hours ending 02/20/23 0757       Physical Exam  Constitutional:       General: He is not in acute distress.     Appearance: He is normal weight.   HENT:      Head: Normocephalic.      Right Ear: External ear normal.      Left Ear: External ear normal.      Nose: Nose normal.      Mouth/Throat:      Mouth: Mucous membranes are moist.   Eyes:      General: No scleral icterus.  Cardiovascular:      Rate and Rhythm: Normal rate.      Heart sounds: Normal heart sounds.   Pulmonary:      Breath sounds: Normal breath sounds.   Abdominal:      Palpations: Abdomen is soft.      Tenderness: There is no abdominal tenderness.   Musculoskeletal:      Right lower leg: No  edema.      Left lower leg: No edema.   Skin:     General: Skin is warm.   Neurological:      General: No focal deficit present.      Mental Status: He is alert and oriented to person, place, and time.   Psychiatric:         Mood and Affect: Mood normal.         Thought Content: Thought content normal.         Significant Labs: All pertinent labs within the past 24 hours have been reviewed.        Assessment/Plan:      Active Diagnoses:    Diagnosis Date Noted POA    PRINCIPAL PROBLEM:  Esophageal mass [K22.89] 02/19/2023 Yes    Anemia [D64.9] 10/14/2013 Yes    Hypertension [I10]  Yes    GERD (gastroesophageal reflux disease) [K21.9]  Yes      Problems Resolved During this Admission:     VTE Risk Mitigation (From admission, onward)           Ordered     IP VTE LOW RISK PATIENT  Once         02/19/23 1212     Place sequential compression device  Until discontinued         02/19/23 1212                  Esophageal mass. NPO with conservative fluids.  Per GI will obtain advanced endoscopic consult.  Continue  Supportive care  History of GERD.  Continue Protonix  Microcytic anemia.  Iron studies  suggestive of anemia of chronic disease.  Continue to  Monitor hemoglobin.    Hypertension.  Continue home antihypertensive     DVT prophylaxis.  SCDs.  Fall precautions       Abilio Macias MD  Department of Hospital Medicine   [facility]

## 2023-02-20 NOTE — SUBJECTIVE & OBJECTIVE
Past Medical History:   Diagnosis Date    Erectile dysfunction     GERD (gastroesophageal reflux disease)     Hyperlipidemia     Hypertension        Past Surgical History:   Procedure Laterality Date    COLONOSCOPY N/A 2017    Procedure: COLONOSCOPY;  Surgeon: AMBER Restrepo MD;  Location: [facility] ([address] [place] [address]);  Service: Endoscopy;  Laterality: N/A;    TOTAL KNEE ARTHROPLASTY  2010    right       Review of patient's allergies indicates:   Allergen Reactions    Amoxicillin Hives and Rash     Family History       Problem Relation (Age of Onset)    Diabetes Mother    Kidney disease Son    Stroke Father          Tobacco Use    Smoking status: Former     Years: 10.00     Types: Cigarettes     Quit date: 2000     Years since quittin.6    Smokeless tobacco: Never   Substance and Sexual Activity    Alcohol use: Yes     Comment: beers 5 d per week, 3 at a time    Drug use: No    Sexual activity: Not on file     Review of Systems   Constitutional:  Positive for appetite change, fatigue and unexpected weight change.   HENT:  Positive for trouble swallowing.    Eyes: Negative.    Respiratory: Negative.     Cardiovascular: Negative.    Gastrointestinal:  Positive for abdominal pain.   Endocrine: Negative.    Genitourinary: Negative.    Musculoskeletal: Negative.    Skin: Negative.    Allergic/Immunologic: Negative.    Neurological: Negative.    Hematological: Negative.    Psychiatric/Behavioral: Negative.     Objective:     Vital Signs (Most Recent):  Temp: 98.5 °F (36.9 °C) (23 0713)  Pulse: 82 (23 0713)  Resp: 18 (23 07)  BP: 131/80 (23 0713)  SpO2: 100 % (23 0713) Vital Signs (24h Range):  Temp:  [96.2 °F (35.7 °C)-98.5 °F (36.9 °C)] 98.5 °F (36.9 °C)  Pulse:  [82-91] 82  Resp:  [17-18] 18  SpO2:  [97 %-100 %] 100 %  BP: (127-156)/(64-91) 131/80     Weight: 66 kg (145 lb 8.1 oz) (23 1201)  Body mass index is 23.48 kg/m².    No intake or output data in the 24  hours ending 02/20/23 0830    Lines/Drains/Airways       Peripheral Intravenous Line  Duration                  Peripheral IV - Single Lumen 02/18/23 1130 20 G Right Antecubital 1 day                    Physical Exam    Gen: NAD, lying comfortably  HENT: NCAT, oropharynx clear  Eyes: anicteric sclerae, EOMI grossly  Neck: supple, no visible masses/goiter  Cardiac: RRR, no M/R/G, S1/S2 present  Lungs: CTAB, no crackles, no wheezes  Abd: soft, NT/ND, normoactive BS, no rebound  Ext: no LE edema, warm, well perfused  Skin: skin intact on exposed body parts, no visible rashes, lesions  Neuro: A&Ox4, neuro exam grossly intact, moves all extremities  Psych: appropriate mood, affect      Significant Labs:  CBC:   Recent Labs   Lab 02/18/23 2202 02/20/23 0318   WBC 5.83 5.21   HGB 9.9* 9.3*   HCT 30.8* 28.8*    365     CMP:   Recent Labs   Lab 02/18/23 2202 02/20/23 0318   GLU 94 70   CALCIUM 9.4 9.2   ALBUMIN 3.5  --    PROT 7.1  --     137   K 3.7 4.4   CO2 22* 24    103   BUN 7* 7*   CREATININE 1.0 0.9   ALKPHOS 61  --    ALT 8*  --    AST 16  --    BILITOT 0.4  --      Coagulation: No results for input(s): PT, INR, APTT in the last 48 hours.    Significant Imaging:  Imaging results within the past 24 hours have been reviewed.

## 2023-02-20 NOTE — TREATMENT PLAN
Brief GI treatment plan    EGD performed today.  Findings:      Impression:            - Extrinsic narrowing of the esophagus.                          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 Biopsied.                          - Normal examined duodenum.   Recommendation:        - Return patient to hospital [facility] for ongoing care.                          - Await path results.                          - Recommend no more than a liquid diet for now.                          - Recommend medical and surgical oncology consults                          pending final pathology.           Please see full endoscopy report for details.    GI will continue to follow.  Please call questions.    Sergey Chew MD  GI Fellow

## 2023-02-20 NOTE — TRANSFER OF CARE
"Anesthesia Transfer of Care Note    Patient: Jeremy Martinez    Procedure(s) Performed: Procedure(s) (LRB):  EGD (ESOPHAGOGASTRODUODENOSCOPY) (N/A)  ULTRASOUND, UPPER GI TRACT, ENDOSCOPIC (N/A)    Patient location: PACU    Anesthesia Type: general    Transport from OR: Transported from OR on room air with adequate spontaneous ventilation    Post pain: adequate analgesia    Post assessment: no apparent anesthetic complications and tolerated procedure well    Post vital signs: stable    Level of consciousness: responds to stimulation    Nausea/Vomiting: no nausea/vomiting    Complications: none    Transfer of care protocol was followed      Last vitals:   Visit Vitals  BP (!) 114/67   Pulse 89   Temp 98   Resp 17   Ht 5' 6" (1.676 m)   Wt 66 kg (145 lb 8.1 oz)   SpO2 97%   BMI 23.48 kg/m²     "

## 2023-02-20 NOTE — PLAN OF CARE
[facility]  Initial Discharge Assessment       Primary Care Provider: Jose Abbott MD    Admission Diagnosis: Esophageal mass [K22.89]    Admission Date: 2/19/2023  Expected Discharge Date: 2/23/2023    Discharge Barriers Identified: None    Payor: HU[employer] MANAGED MEDICARE / Plan: HU[employer]NA TOTAL CARE ADVANTAGE / Product Type: Medicare Advantage /     Extended Emergency Contact Information  Primary Emergency Contact: Hui Martinez  Address: [address] [place] [address] .           [address]  Home Phone: [phone]  Mobile Phone: [phone]  Relation: Spouse    Discharge Plan A: Home with family  Discharge Plan B: Home Health      [facility]  [place] - [address]  [address] [place] [address]  Phone: [phone] Fax: [phone]      Initial Assessment (most recent)       Adult Discharge Assessment - 02/20/23 1532          Discharge Assessment    Assessment Type Discharge Planning Assessment     Confirmed/corrected address, phone number and insurance Yes     Confirmed Demographics Correct on Facesheet     Source of Information family     If unable to respond/provide information was family/caregiver contacted? Yes     Contact Name/Number Hui Martinez     Communicated CHUCHO with patient/caregiver Yes     People in Home spouse     Do you expect to return to your current living situation? Yes     Do you have help at home or someone to help you manage your care at home? Yes     Prior to hospitilization cognitive status: Alert/Oriented     Current cognitive status: Alert/Oriented     Equipment Currently Used at Home none     Readmission within 30 days? No     Do you currently have service(s) that help you manage your care at home? No     Do you take prescription medications? Yes     Do you have prescription coverage? Yes     Do you have any problems affording any of your prescribed medications? No     Is the patient taking medications as  prescribed? yes     Who is going to help you get home at discharge? Family     Are you on dialysis? No     Do you take coumadin? No     Discharge Plan A Home with family     Discharge Plan B Home Health     DME Needed Upon Discharge  none     Discharge Plan discussed with: Spouse/sig other     Discharge Barriers Identified None                        Alicia Fletcher RN, CM   Ext: [phone]

## 2023-02-20 NOTE — ASSESSMENT & PLAN NOTE
Patient with presenting symptoms epigastric pain and dysphagia with CT findings of gastric cardia/GE junction mass as well as likely peripancreatic lymphadenopathy.  Labs showing iron deficiency anemia.      Recommendations:  -EGD/EUS today for further evaluation  -trend CBC, CMP, INR  -keep NPO  -IV PPI BID

## 2023-02-20 NOTE — ANESTHESIA POSTPROCEDURE EVALUATION
Anesthesia Post Evaluation    Patient: Jeremy Martinez    Procedure(s) Performed: Procedure(s) (LRB):  EGD (ESOPHAGOGASTRODUODENOSCOPY) (N/A)  ULTRASOUND, UPPER GI TRACT, ENDOSCOPIC (N/A)    Final Anesthesia Type: general      Level of consciousness: awake and alert  Post-procedure vital signs: reviewed and stable  Pain control: Pain has been treated.  Airway patency: patent    PONV status: Absent or treated.  Anesthetic complications: no      Cardiovascular status: hemodynamically stable  Respiratory status: unassisted  Hydration status: euvolemic            Vitals Value Taken Time   /90 02/20/23 1525   Temp 36.1 °C (97 °F) 02/20/23 1525   Pulse 70 02/20/23 1525   Resp 18 02/20/23 1525   SpO2 100 % 02/20/23 1525         Event Time   Out of Recovery 15:00:00         Pain/Mike Score: Pain Rating Prior to Med Admin: 7 (2/19/2023  2:31 PM)  Pain Rating Post Med Admin: 0 (2/19/2023  5:52 AM)  Mike Score: 10 (2/20/2023  3:00 PM)

## 2023-02-20 NOTE — PLAN OF CARE
Received from Endo. AAOx4. Denies pain or nausea.VSS. Wife at bedside. Clear liquid diet given post proocedure.IVF at 75cc/hr. WCTM

## 2023-02-21 LAB
ANION GAP SERPL CALC-SCNC: 11 MMOL/L (ref 8–16)
BASOPHILS # BLD AUTO: 0.02 K/UL (ref 0–0.2)
BASOPHILS NFR BLD: 0.3 % (ref 0–1.9)
BUN SERPL-MCNC: 10 MG/DL (ref 8–23)
CALCIUM SERPL-MCNC: 9.1 MG/DL (ref 8.7–10.5)
CHLORIDE SERPL-SCNC: 104 MMOL/L (ref 95–110)
CO2 SERPL-SCNC: 21 MMOL/L (ref 23–29)
CREAT SERPL-MCNC: 0.9 MG/DL (ref 0.5–1.4)
DIFFERENTIAL METHOD: ABNORMAL
EOSINOPHIL # BLD AUTO: 0.1 K/UL (ref 0–0.5)
EOSINOPHIL NFR BLD: 1.2 % (ref 0–8)
ERYTHROCYTE [DISTWIDTH] IN BLOOD BY AUTOMATED COUNT: 14.6 % (ref 11.5–14.5)
EST. GFR  (NO RACE VARIABLE): >60 ML/MIN/1.73 M^2
GLUCOSE SERPL-MCNC: 83 MG/DL (ref 70–110)
HCT VFR BLD AUTO: 28.4 % (ref 40–54)
HGB BLD-MCNC: 8.8 G/DL (ref 14–18)
IMM GRANULOCYTES # BLD AUTO: 0.03 K/UL (ref 0–0.04)
IMM GRANULOCYTES NFR BLD AUTO: 0.4 % (ref 0–0.5)
LYMPHOCYTES # BLD AUTO: 1.1 K/UL (ref 1–4.8)
LYMPHOCYTES NFR BLD: 14.1 % (ref 18–48)
MCH RBC QN AUTO: 23.5 PG (ref 27–31)
MCHC RBC AUTO-ENTMCNC: 31 G/DL (ref 32–36)
MCV RBC AUTO: 76 FL (ref 82–98)
MONOCYTES # BLD AUTO: 0.5 K/UL (ref 0.3–1)
MONOCYTES NFR BLD: 6.6 % (ref 4–15)
NEUTROPHILS # BLD AUTO: 5.9 K/UL (ref 1.8–7.7)
NEUTROPHILS NFR BLD: 77.4 % (ref 38–73)
NRBC BLD-RTO: 0 /100 WBC
PLATELET # BLD AUTO: 347 K/UL (ref 150–450)
PMV BLD AUTO: 9.3 FL (ref 9.2–12.9)
POTASSIUM SERPL-SCNC: 3.8 MMOL/L (ref 3.5–5.1)
RBC # BLD AUTO: 3.75 M/UL (ref 4.6–6.2)
SODIUM SERPL-SCNC: 136 MMOL/L (ref 136–145)
WBC # BLD AUTO: 7.57 K/UL (ref 3.9–12.7)

## 2023-02-21 PROCEDURE — 85025 COMPLETE CBC W/AUTO DIFF WBC: CPT | Mod: HCNC | Performed by: INTERNAL MEDICINE

## 2023-02-21 PROCEDURE — 25000003 PHARM REV CODE 250: Mod: HCNC | Performed by: INTERNAL MEDICINE

## 2023-02-21 PROCEDURE — 25000003 PHARM REV CODE 250: Mod: HCNC | Performed by: HOSPITALIST

## 2023-02-21 PROCEDURE — 36415 COLL VENOUS BLD VENIPUNCTURE: CPT | Mod: HCNC | Performed by: INTERNAL MEDICINE

## 2023-02-21 PROCEDURE — 80048 BASIC METABOLIC PNL TOTAL CA: CPT | Mod: HCNC | Performed by: INTERNAL MEDICINE

## 2023-02-21 PROCEDURE — 99232 PR SUBSEQUENT HOSPITAL CARE,LEVL II: ICD-10-PCS | Mod: HCNC,,, | Performed by: INTERNAL MEDICINE

## 2023-02-21 PROCEDURE — 20600001 HC STEP DOWN PRIVATE ROOM: Mod: HCNC

## 2023-02-21 PROCEDURE — 99232 SBSQ HOSP IP/OBS MODERATE 35: CPT | Mod: HCNC,,, | Performed by: INTERNAL MEDICINE

## 2023-02-21 RX ADMIN — PANTOPRAZOLE SODIUM 40 MG: 40 TABLET, DELAYED RELEASE ORAL at 06:02

## 2023-02-21 RX ADMIN — Medication 6 MG: at 09:02

## 2023-02-21 RX ADMIN — LISINOPRIL 40 MG: 20 TABLET ORAL at 08:02

## 2023-02-21 NOTE — TREATMENT PLAN
AES Follow-up Note     Jeremy Martinez was seen and examined.   1 Day Post-Op s/p EGD  No abdominal discomfort. Tolerating clear liquid diet.    Vitals stable. Afebrile.     Lab Results   Component Value Date    ALT 8 (L) 02/18/2023    AST 16 02/18/2023    ALKPHOS 61 02/18/2023    BILITOT 0.4 02/18/2023    BILITOT 0.6 10/03/2022    BILITOT 0.9 09/23/2021          Jay Girard MD  Gastroenterology & Hepatology Fellow

## 2023-02-21 NOTE — PROGRESS NOTES
[facility]  Progress Note    Patient Name: Jeremy Martinez  MRN: [id number]  Patient Class: IP- Inpatient   Admission Date: 2/19/2023  Length of Stay: 2 days  Attending Physician: Abilio Macias MD  Primary Care Provider: Jose Abbott MD        Subjective:     Principal Problem:Esophageal mass    Interval History: 67-year-old gentleman past medical history of hypertension, hyperlipidemia and GERD presented to the hospital with epigastric pain.  Patient was found to have an esophageal mass and was transferred to [facility] for specialist care. Patient underwent endoscopy with biopsy of GI mass.    Post procedure, no complaints. On liquid diet. Awaiting path report.  No significant change in labs    Review of Systems   All other systems reviewed and are negative.  Objective:     Vital Signs (Most Recent):  Temp: 98.1 °F (36.7 °C) (02/21/23 1141)  Pulse: 71 (02/21/23 1141)  Resp: 18 (02/21/23 1141)  BP: (!) 168/80 (02/21/23 1141)  SpO2: 98 % (02/21/23 1141)   Vital Signs (24h Range):  Temp:  [97 °F (36.1 °C)-98.1 °F (36.7 °C)] 98.1 °F (36.7 °C)  Pulse:  [70-97] 71  Resp:  [16-22] 18  SpO2:  [94 %-100 %] 98 %  BP: ()/(61-90) 168/80     Weight: 66 kg (145 lb 8.1 oz)  Body mass index is 23.48 kg/m².    Intake/Output Summary (Last 24 hours) at 2/21/2023 1430  Last data filed at 2/21/2023 0443  Gross per 24 hour   Intake 1809.46 ml   Output 5 ml   Net 1804.46 ml        Physical Exam  Constitutional:       General: He is not in acute distress.     Appearance: He is normal weight.   HENT:      Head: Normocephalic.      Right Ear: External ear normal.      Left Ear: External ear normal.      Nose: Nose normal.      Mouth/Throat:      Mouth: Mucous membranes are moist.   Eyes:      General: No scleral icterus.  Cardiovascular:      Rate and Rhythm: Normal rate.      Heart sounds: Normal heart sounds.   Pulmonary:      Breath sounds: Normal breath sounds.   Abdominal:      Palpations: Abdomen is soft.       Tenderness: There is no abdominal tenderness.   Musculoskeletal:      Right lower leg: No edema.      Left lower leg: No edema.   Skin:     General: Skin is warm.   Neurological:      General: No focal deficit present.      Mental Status: He is alert and oriented to person, place, and time.   Psychiatric:         Mood and Affect: Mood normal.         Thought Content: Thought content normal.            Significant Labs: All pertinent labs within the past 24 hours have been reviewed.        Assessment/Plan:      Active Diagnoses:    Diagnosis Date Noted POA    PRINCIPAL PROBLEM:  Esophageal mass [K22.89] 02/19/2023 Yes    Anemia [D64.9] 10/14/2013 Yes    Hypertension [I10]  Yes    GERD (gastroesophageal reflux disease) [K21.9]  Yes      Problems Resolved During this Admission:     VTE Risk Mitigation (From admission, onward)           Ordered     IP VTE LOW RISK PATIENT  Once         02/19/23 1212     Place sequential compression device  Until discontinued         02/19/23 1212                  Gastric tumor s/p biopsy. Liquid diet per GI. F/u on path report and GI recommendations.  Continue  Supportive care  History of GERD.  Continue Protonix  Microcytic anemia.  Iron studies  suggestive of anemia of chronic disease.  Continue to  Monitor hemoglobin.    Hypertension.  Continue home antihypertensive     DVT prophylaxis.  SCDs.  Fall precautions    Abilio Macias MD  Department of Hospital Medicine   [facility]

## 2023-02-22 VITALS
OXYGEN SATURATION: 100 % | HEART RATE: 86 BPM | RESPIRATION RATE: 14 BRPM | DIASTOLIC BLOOD PRESSURE: 83 MMHG | WEIGHT: 145.5 LBS | TEMPERATURE: 97 F | BODY MASS INDEX: 23.38 KG/M2 | SYSTOLIC BLOOD PRESSURE: 143 MMHG | HEIGHT: 66 IN

## 2023-02-22 LAB
ANION GAP SERPL CALC-SCNC: 14 MMOL/L (ref 8–16)
BASOPHILS # BLD AUTO: 0.02 K/UL (ref 0–0.2)
BASOPHILS NFR BLD: 0.3 % (ref 0–1.9)
BUN SERPL-MCNC: 13 MG/DL (ref 8–23)
CALCIUM SERPL-MCNC: 8.8 MG/DL (ref 8.7–10.5)
CHLORIDE SERPL-SCNC: 105 MMOL/L (ref 95–110)
CO2 SERPL-SCNC: 20 MMOL/L (ref 23–29)
CREAT SERPL-MCNC: 0.8 MG/DL (ref 0.5–1.4)
DIFFERENTIAL METHOD: ABNORMAL
EOSINOPHIL # BLD AUTO: 0.1 K/UL (ref 0–0.5)
EOSINOPHIL NFR BLD: 1.5 % (ref 0–8)
ERYTHROCYTE [DISTWIDTH] IN BLOOD BY AUTOMATED COUNT: 14.5 % (ref 11.5–14.5)
EST. GFR  (NO RACE VARIABLE): >60 ML/MIN/1.73 M^2
GLUCOSE SERPL-MCNC: 60 MG/DL (ref 70–110)
HCT VFR BLD AUTO: 29.6 % (ref 40–54)
HGB BLD-MCNC: 9.1 G/DL (ref 14–18)
IMM GRANULOCYTES # BLD AUTO: 0.01 K/UL (ref 0–0.04)
IMM GRANULOCYTES NFR BLD AUTO: 0.2 % (ref 0–0.5)
LYMPHOCYTES # BLD AUTO: 1 K/UL (ref 1–4.8)
LYMPHOCYTES NFR BLD: 16.9 % (ref 18–48)
MCH RBC QN AUTO: 23.4 PG (ref 27–31)
MCHC RBC AUTO-ENTMCNC: 30.7 G/DL (ref 32–36)
MCV RBC AUTO: 76 FL (ref 82–98)
MONOCYTES # BLD AUTO: 0.6 K/UL (ref 0.3–1)
MONOCYTES NFR BLD: 9.2 % (ref 4–15)
NEUTROPHILS # BLD AUTO: 4.3 K/UL (ref 1.8–7.7)
NEUTROPHILS NFR BLD: 71.9 % (ref 38–73)
NRBC BLD-RTO: 0 /100 WBC
PLATELET # BLD AUTO: 335 K/UL (ref 150–450)
PMV BLD AUTO: 9.7 FL (ref 9.2–12.9)
POTASSIUM SERPL-SCNC: 3.7 MMOL/L (ref 3.5–5.1)
RBC # BLD AUTO: 3.89 M/UL (ref 4.6–6.2)
SODIUM SERPL-SCNC: 139 MMOL/L (ref 136–145)
WBC # BLD AUTO: 5.99 K/UL (ref 3.9–12.7)

## 2023-02-22 PROCEDURE — 25000003 PHARM REV CODE 250: Mod: HCNC | Performed by: INTERNAL MEDICINE

## 2023-02-22 PROCEDURE — 99239 PR HOSPITAL DISCHARGE DAY,>30 MIN: ICD-10-PCS | Mod: HCNC,,, | Performed by: INTERNAL MEDICINE

## 2023-02-22 PROCEDURE — 1111F PR DISCHARGE MEDS RECONCILED W/ CURRENT OUTPATIENT MED LIST: ICD-10-PCS | Mod: HCNC,CPTII,, | Performed by: INTERNAL MEDICINE

## 2023-02-22 PROCEDURE — 36415 COLL VENOUS BLD VENIPUNCTURE: CPT | Mod: HCNC | Performed by: INTERNAL MEDICINE

## 2023-02-22 PROCEDURE — 85025 COMPLETE CBC W/AUTO DIFF WBC: CPT | Mod: HCNC | Performed by: INTERNAL MEDICINE

## 2023-02-22 PROCEDURE — 1111F DSCHRG MED/CURRENT MED MERGE: CPT | Mod: HCNC,CPTII,, | Performed by: INTERNAL MEDICINE

## 2023-02-22 PROCEDURE — 80048 BASIC METABOLIC PNL TOTAL CA: CPT | Mod: HCNC | Performed by: INTERNAL MEDICINE

## 2023-02-22 PROCEDURE — 99239 HOSP IP/OBS DSCHRG MGMT >30: CPT | Mod: HCNC,,, | Performed by: INTERNAL MEDICINE

## 2023-02-22 RX ORDER — ONDANSETRON 4 MG/1
4 TABLET, ORALLY DISINTEGRATING ORAL EVERY 6 HOURS PRN
Qty: 1 TABLET | Refills: 0 | Status: SHIPPED | OUTPATIENT
Start: 2023-02-22 | End: 2023-02-24 | Stop reason: SDUPTHER

## 2023-02-22 RX ADMIN — LISINOPRIL 40 MG: 20 TABLET ORAL at 09:02

## 2023-02-22 RX ADMIN — PANTOPRAZOLE SODIUM 40 MG: 40 TABLET, DELAYED RELEASE ORAL at 09:02

## 2023-02-22 NOTE — DISCHARGE SUMMARY
[facility]  Discharge Summary      Patient Name: Jeremy Martinez  MRN: [id number]  Admission Date: 2/19/2023  Hospital Length of Stay: 3 days  Discharge Date and Time:  02/22/2023 10:06 AM  Attending Physician: Abilio Davis MD   Discharging Provider: Abilio Davis MD  Primary Care Provider: Jose Abbott MD            Procedure(s) (LRB):  EGD (ESOPHAGOGASTRODUODENOSCOPY) (N/A)  ULTRASOUND, UPPER GI TRACT, ENDOSCOPIC (N/A)      Hospital Course: 67-year-old gentleman past medical history of hypertension, hyperlipidemia and GERD presented to the hospital with epigastric pain.  Patient was found to have an esophageal mass and was transferred to [facility] for specialist care. Patient underwent endoscopy with biopsy of GI mass which was sent for pathology, pathology report is pending.  Post procedures no complications.  Patient remained stable on liquid diet.  Spoke with GI, they are okay for patient to be discharged on liquid diet.  He is to follow up with them as an outpatient, once finalized Pathology report he will be referred to the necessary specialist.  Patient was discharged on liquid diet.  He is to follow up with his primary care.      On physical examination.  Comfortable male in no cardiopulmonary distress.  Abdomen is soft and nontender.  Breath sounds are normal.  Heart sounds normal.  He was alert and oriented time place and person, no focal deficit.    Consults:   Consults (From admission, onward)          Status Ordering Provider     Inpatient consult to [facility]  Once        Provider:  (Not yet assigned)    Completed ABILIO DAVIS            Final Active Diagnoses:    Diagnosis Date Noted POA    PRINCIPAL PROBLEM:  Esophageal mass [K22.89] 02/19/2023 Yes    Anemia [D64.9] 10/14/2013 Yes    Hypertension [I10]  Yes     Chronic    GERD (gastroesophageal reflux disease) [K21.9]  Yes     Chronic      Problems Resolved During this Admission:      Discharged Condition:  stable    Disposition: Home or Self Care    Follow Up:   Follow-up Information       Jose Abbott MD Follow up in 1 week(s).    Specialty: Internal Medicine  Contact information:  [address]  [facility] [id number] [phone]                           Patient Instructions:      Ambulatory referral/consult to Gastroenterology   Standing Status: Future   Referral Priority: Routine Referral Type: Consultation   Referral Reason: Specialty Services Required   Requested Specialty: Gastroenterology   Number of Visits Requested: 1     Diet full liquid     Medications:  Reconciled Home Medications:      Medication List        START taking these medications      ondansetron 4 MG Tbdl  Commonly known as: ZOFRAN-ODT  Take 1 tablet (4 mg total) by mouth every 6 (six) hours as needed (naseau and vomiting).            CONTINUE taking these medications      acetaminophen 325 MG tablet  Commonly known as: TYLENOL  Take 325 mg by mouth every 6 (six) hours as needed for Pain.     lisinopriL 40 MG tablet  Commonly known as: PRINIVIL,ZESTRIL  Take 1 tablet (40 mg total) by mouth once daily.     omeprazole 40 MG capsule  Commonly known as: PRILOSEC  Take 1 capsule (40 mg total) by mouth once daily.     sildenafiL 100 MG tablet  Commonly known as: VIAGRA  Take 1 tablet (100 mg total) by mouth as needed for Erectile Dysfunction.                  Pending Diagnostic Studies:       None          Indwelling Lines/Drains at time of discharge:   Lines/Drains/Airways       None                   Time spent on the discharge of patient: 35 minutes         Abilio Macias MD  Department of Hospital Medicine  [facility]

## 2023-02-22 NOTE — PLAN OF CARE
SSC met with patient/family at bedside. Patient experience rounding completed and reviewed the following.     Do you know your discharge plan? Yes or No,    If yes, what is the plan? (Home, Home Health, Rehab, SNF, LTAC, or Other)    Home    If you are discharging home, do you have help at home? Yes or No    Yes/Family    Do you think you will need help at home at discharge? Yes or No    No    Have you discussed your needs and preferences with your SW/CM? Yes or No    Yes    Assigned SW/CM notified of any patient/family needs or concerns.    Alicia Centeno [facility]  Case Management   [phone]

## 2023-02-22 NOTE — PLAN OF CARE
[facility]  Discharge Final Note    Primary Care Provider: Jose Abbott MD    Expected Discharge Date: 2/22/2023    Final Discharge Note (most recent)       Final Note - 02/22/23 1036          Final Note    Assessment Type Final Discharge Note     Anticipated Discharge Disposition Home or Self Care     Hospital Resources/Appts/Education Provided Appointments scheduled and added to AVS        Post-Acute Status    Post-Acute Authorization Other     Other Status No Post-Acute Service Needs                     Important Message from Medicare  Important Message from Medicare regarding Discharge Appeal Rights: Given to patient/caregiver, Explained to patient/caregiver, Signed/date by patient/caregiver     Date IMM was signed: 02/22/23  Time IMM was signed: 1030    Contact Info       Jose Abbott MD   Specialty: Internal Medicine   Relationship: PCP - General    [address]  [place] [address]   Phone: [phone]       Next Steps: Follow up in 1 week(s)    Paulino Ludwig MD   Specialty: Gastroenterology    [address] [facility] [address]   Phone: [phone]       Next Steps: Follow up on 3/8/2023    Instructions: Appointment at 4:20PM          Alicia Fletcher RN, CM   Ext: [phone]

## 2023-02-22 NOTE — NURSING
Pt dc to home, dc home instructions reviewed with medications. Education given and instructions on how to use  [facility]. Saline lock removed covered with gauze dsg and secured with coban. Pt dc home AAOX4, ambulating with steady, family at bedside

## 2023-02-22 NOTE — TREATMENT PLAN
GI Treatment Plan    Jeremy Martinez is a 67 y.o. male admitted to hospital 2/19/2023 (Hospital Day: 4) due to Esophageal mass.     Interval History  Tolerating diet.    Objective  Temp:  [97.4 °F (36.3 °C)-98.3 °F (36.8 °C)] 97.4 °F (36.3 °C) (02/22 0745)  Pulse:  [71-91] 86 (02/22 0745)  BP: (136-168)/(80-85) 143/83 (02/22 0745)  Resp:  [14-18] 14 (02/22 0745)  SpO2:  [98 %-100 %] 100 % (02/22 0745)    Laboratory    Recent Labs   Lab 02/20/23  0318 02/21/23  0301 02/22/23  0654   HGB 9.3* 8.8* 9.1*         Assessment and Plan  - continue liquid diet  - trend CBC, CMP, INR  - await pathology  - pending pathology results will need surg onc and oncology consult  - no need for patient to remain inpatient from an AES standpoint  - pending above consults could consider esophageal stenting in the future  - We will continue to follow preipherally.    Thank you for involving us in the care of Jeremy Martinez. Please call with any additional questions, concerns or changes in the patient's clinical status.    Sergey Chew MD  Gastroenterology Fellow, PGY IV

## 2023-02-23 ENCOUNTER — TELEPHONE (OUTPATIENT)
Dept: INTERNAL MEDICINE | Facility: CLINIC | Age: 68
End: 2023-02-23
Payer: MEDICARE

## 2023-02-23 ENCOUNTER — NURSE TRIAGE (OUTPATIENT)
Dept: ADMINISTRATIVE | Facility: CLINIC | Age: 68
End: 2023-02-23
Payer: MEDICARE

## 2023-02-23 ENCOUNTER — PATIENT OUTREACH (OUTPATIENT)
Dept: ADMINISTRATIVE | Facility: CLINIC | Age: 68
End: 2023-02-23
Payer: MEDICARE

## 2023-02-23 NOTE — PROGRESS NOTES
C3 nurse spoke with Jeremy Martinez for a TCC post hospital discharge follow up call. The patient does not have a scheduled HOSFU appointment with Jose Abbott MD within 5-7 days post hospital discharge date 02/22/2023. C3 nurse was unable to schedule HOSFU appointment in [facility].    Message sent to PCP staff requesting they contact patient and schedule follow up appointment.     Patient declines NP home referral at this time.

## 2023-02-23 NOTE — TELEPHONE ENCOUNTER
Patient's wife states patient discharged from Hospital on 02/22/23 and called to inquire if ok to resume home and prn medications. AVS Discharge Summary reviewed with patient's wife and no discontinued medications noted.     Wife also states patient completed a post-discharge call and she advised caller that patient's post-op prescription for Zofran 4 mg was for only one (1) pill. Wife states Post-Discharge Caller stated that she will notify patient's discharge Provider regarding prescription clarification. Wife cites no additional concerns at this time.    Reason for Disposition   Information only question and nurse able to answer    Additional Information   Negative: Nursing judgment   Negative: Nursing judgment   Negative: Nursing judgment   Negative: Nursing judgment    Protocols used: Information Only Call - No Triage-A-OH

## 2023-02-24 ENCOUNTER — NURSE TRIAGE (OUTPATIENT)
Dept: ADMINISTRATIVE | Facility: CLINIC | Age: 68
End: 2023-02-24
Payer: MEDICARE

## 2023-02-24 RX ORDER — ONDANSETRON 4 MG/1
4 TABLET, ORALLY DISINTEGRATING ORAL EVERY 6 HOURS PRN
Qty: 20 TABLET | Refills: 0 | Status: SHIPPED | OUTPATIENT
Start: 2023-02-24 | End: 2023-03-06

## 2023-02-24 NOTE — TELEPHONE ENCOUNTER
Reason for Disposition   Nausea lasts > 1 week    Additional Information   Negative: Shock suspected (e.g., cold/pale/clammy skin, too weak to stand, low BP, rapid pulse)   Negative: Sounds like a life-threatening emergency to the triager   Negative: [1] Nausea or vomiting AND [2] pregnancy < 20 weeks   Negative: Menstrual Period - Missed or Late (i.e., pregnancy suspected)   Negative: Heat exhaustion suspected (i.e., dehydration from heat exposure)   Negative: Motion sickness suspected (i.e., nausea with car, plane, boat, or train travel)   Negative: Anxiety or stress suspected (i.e., nausea with anxiety attacks or stressful situations)   Negative: Traumatic Brain Injury (TBI) suspected   Negative: Nausea (or Vomiting) in a cancer patient who is currently (or recently) receiving chemotherapy or radiation therapy, or cancer patient who has metastatic or end-stage cancer and is receiving palliative care   Negative: Vomiting occurs   Negative: Other symptom is present, see that guideline.  (e.g., chest pain, headache, dizziness, abdominal pain, colds, sore throat, etc.).   Negative: Unable to walk, or can only walk with assistance (e.g., requires support)   Negative: Difficulty breathing   Negative: [1] Insulin-dependent diabetes (Type I) AND [2] glucose > 400 mg/dl (22 mmol/l)   Negative: [1] Drinking very little AND [2] dehydration suspected (e.g., no urine > 12 hours, very dry mouth, very lightheaded)   Negative: Patient sounds very sick or weak to the triager   Negative: Fever > 104 F (40 C)   Negative: [1] Fever > 101 F (38.3 C) AND [2] age > 60 years   Negative: [1] Fever > 100.0 F (37.8 C) AND [2] bedridden (e.g., nursing home patient, CVA, chronic illness, recovering from surgery)   Negative: [1] Fever > 100.0 F (37.8 C) AND [2] diabetes mellitus or weak immune system (e.g., HIV positive, cancer chemo, splenectomy, organ transplant, chronic steroids)   Negative: Taking any of the following medications: digoxin  (Lanoxin), lithium, theophylline, phenytoin (Dilantin)   Negative: Yellowish color of the skin or white of the eye (i.e., jaundice)   Negative: Fever present > 3 days (72 hours)   Negative: Receiving cancer chemotherapy medication   Negative: Taking prescription medication that could cause nausea (e.g., narcotics/opiates, antibiotics, OCPs, many others)    Protocols used: Nausea-A-AH  Spouse called re in hosp sun - wed. Zofran sent q 6 hours for nausea. went to get rx - one pill. Still having nausea. Pharm: [facility]  Spoke with dr domínguez via secure chat. MD states he will send in med. Spouse notified at 530pm

## 2023-02-27 ENCOUNTER — TELEPHONE (OUTPATIENT)
Dept: ENDOSCOPY | Facility: HOSPITAL | Age: 68
End: 2023-02-27

## 2023-02-27 NOTE — TELEPHONE ENCOUNTER
----- Message from Teri Orourke sent at 2/27/2023 11:54 AM CST -----  Regarding: Results  Contact: [phone]  Pt is calling for results for EGD done on 032/20/23 and is questioning why appt on 03/08/23 was cancelled?  Please call.

## 2023-02-27 NOTE — PROGRESS NOTES
[facility]  Follow-up Visit Progress Note     PRESENTING HISTORY     PCP: Jose Abbott MD  Chief Complaint/Reason for Visit:  Follow up from recent visit.    No chief complaint on file.    History of Present Illness & ROS: Mr. Jeremy Martinez is a 67 y.o. male.  Same day appt.   New to me.   Est'd with Dr. Abbott.   Recently discharged from acute care setting, post procedure for 'mass' at esophageal junction, s/p biopsy with path report resulting for Large Diffuse B Cell Lymphoma.   He is here today with his supportive wife, Ms. Diehl.   He is in good spirits.   Having some issues with 'nausea, hiccups, and congestion'. He is currently trying to take in and keep down 'thick soups, but there are times that this is still difficult'. He has spoken with his GI time in regards to trialing 'Thorazine, but don't want to take anything that will have significant side effects'.   He has been referred Oncology by his Gi team and waiting to hear of appt.... (advised to keep PCP in the loop).     Review of System:   Eyes: denies visual changes at this time denies floaters   ENT: no sore throat  Respiratory: no cough or shorness of breath  Cardiovascular: no chest pain or palpitations  Genitourinary: no hematuria or dysuria; denies frequency  Hematologic/Lymphatic: no easy bruising or lymphadenopathy  Musculoskeletal: no arthralgias or myalgias  Neurological: no seizures or tremors  Endocrine: no heat or cold intolerance      PAST HISTORY:     Past Medical History:   Diagnosis Date    Erectile dysfunction     GERD (gastroesophageal reflux disease) 2010    Hyperlipidemia 2008    Hypertension 2007       Past Surgical History:   Procedure Laterality Date    COLONOSCOPY N/A 8/1/2017    Procedure: COLONOSCOPY;  Surgeon: AMBER Restrepo MD;  Location: [address]);  Service: Endoscopy;  Laterality: N/A;    ENDOSCOPIC ULTRASOUND OF UPPER GASTROINTESTINAL TRACT N/A 2/20/2023    Procedure: ULTRASOUND, UPPER GI TRACT,  ENDOSCOPIC;  Surgeon: Ady Cornell MD;  [facility]  Service: Endoscopy;  Laterality: N/A;    ESOPHAGOGASTRODUODENOSCOPY N/A 2023    Procedure: EGD (ESOPHAGOGASTRODUODENOSCOPY);  Surgeon: dAy Cornell MD;  Location[facility]  Service: Endoscopy;  Laterality: N/A;    TOTAL KNEE ARTHROPLASTY  2010    right       Family History   Problem Relation Age of Onset    Diabetes Mother     Stroke Father     Kidney disease Son         transplant    Cancer Neg Hx     Colon cancer Neg Hx     Colon polyps Neg Hx     Esophageal cancer Neg Hx     Stomach cancer Neg Hx     Rectal cancer Neg Hx     Ulcerative colitis Neg Hx     Crohn's disease Neg Hx        Social History     Socioeconomic History    Marital status:    Tobacco Use    Smoking status: Former     Years: 10.00     Types: Cigarettes     Quit date: 2000     Years since quittin.6    Smokeless tobacco: Never   Substance and Sexual Activity    Alcohol use: Yes     Comment: beers 5 d per week, 3 at a time    Drug use: No   Social History Narrative     (in gen healthy), 5 kids. 4 grandkids. 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 No formal exercise but active. 1 son living with him.       MEDICATIONS & ALLERGIES:     Current Outpatient Medications on File Prior to Visit   Medication Sig Dispense Refill    acetaminophen (TYLENOL) 325 MG tablet Take 325 mg by mouth every 6 (six) hours as needed for Pain.      lisinopriL (PRINIVIL,ZESTRIL) 40 MG tablet Take 1 tablet (40 mg total) by mouth once daily. 90 tablet 11    omeprazole (PRILOSEC) 40 MG capsule Take 1 capsule (40 mg total) by mouth once daily. 90 capsule 0    ondansetron (ZOFRAN-ODT) 4 MG TbDL Take 1 tablet (4 mg total) by mouth every 6 (six) hours as needed (naseau and vomiting). 20 tablet 0    sildenafiL (VIAGRA) 100 MG tablet Take 1 tablet (100 mg total) by mouth as needed for Erectile Dysfunction. 12 tablet 1     No current facility-administered medications on file  prior to visit.        Review of patient's allergies indicates:   Allergen Reactions    Amoxicillin Hives and Rash       Medications Reconciliation:   I have reconciled the patient's home medications and discharge medications with the patient/family. I have updated all changes.  Refer to After-Visit Medication List.    OBJECTIVE:     Vital Signs:  There were no vitals filed for this visit.  Wt Readings from Last 3 Encounters:   02/19/23 1201 66 kg (145 lb 8.1 oz)   02/18/23 2153 66 kg (145 lb 8.1 oz)   01/17/23 0952 68.2 kg (150 lb 5.7 oz)     There is no height or weight on file to calculate BMI.   Wt Readings from Last 3 Encounters:   03/03/23 60.3 kg (132 lb 15 oz)   02/19/23 66 kg (145 lb 8.1 oz)   02/18/23 66 kg (145 lb 8.1 oz)     Temp Readings from Last 3 Encounters:   02/22/23 97.4 °F (36.3 °C) (Oral)   02/19/23 97.7 °F (36.5 °C)   12/23/20 98.1 °F (36.7 °C) (Oral)     BP Readings from Last 3 Encounters:   03/03/23 138/88   02/22/23 (!) 143/83   02/19/23 (!) 145/89     Pulse Readings from Last 3 Encounters:   03/03/23 90   02/22/23 86   02/19/23 79       Physical Exam:  General: Frail. No distress.  HEENT: Head is normocephalic, atraumatic  Eyes: Clear conjunctiva.  Neck: Supple, symmetrical neck; trachea midline.  Lungs: Clear to auscultation bilaterally and normal respiratory effort.  Cardiovascular: Heart with regular rate and rhythm. No murmurs, gallops or rubs  Skin: Skin color, texture, turgor normal. No rashes.  Musculoskeletal: Normal gait.   Neurologic:No focal numbness or weakness.       Laboratory  Lab Results   Component Value Date    WBC 5.99 02/22/2023    HGB 9.1 (L) 02/22/2023    HCT 29.6 (L) 02/22/2023     02/22/2023    CHOL 232 (H) 10/03/2022    TRIG 78 10/03/2022    HDL 58 10/03/2022    ALT 8 (L) 02/18/2023    AST 16 02/18/2023     02/22/2023    K 3.7 02/22/2023     02/22/2023    CREATININE 0.8 02/22/2023    BUN 13 02/22/2023    CO2 20 (L) 02/22/2023    TSH 1.45  09/28/2021    PSA 0.45 10/03/2022    INR 1.0 12/23/2020    HGBA1C 6.1 12/28/2011         ASSESSMENT & PLAN:     Diffuse large B-cell lymphoma, unspecified body region    Esophageal mass    Nausea  Recent admission for Epigastric Pain and found to have an Esophageal (GE junction) Mass, underwent biopsy *Notations made to Path report and notification to Dr. Cornell on 3/2/2023 (Diffuse Large B Cell Lymphoma..); patient was made aware on 3/2/2023 per his GI team of experts.   ` Has follow up with Dr. Cornell and Dr. Macias in GI and pending appt with Hem / Oncology   *Post procedural Hiccups with message sent to his GI team, as noted on 3/1/2023; rec'd PPI daily; trying  *Post admission nausea: Rx'd Zofran per admitting / discharge team, but still not helping per patient; will try to optimize with Promethazine for severe events   -     omeprazole (PRILOSEC) 40 MG capsule; Take 1 capsule (40 mg total) by mouth 2 (two) times daily before meals.  Dispense: 180 capsule; Refill: 1  -     promethazine (PHENERGAN) 12.5 MG Tab; Take 2 tablets (25 mg total) by mouth every 6 (six) hours as needed (Severe Nausea).  Dispense: 30 tablet; Refill: 0  -     azelastine (ASTELIN) 137 mcg (0.1 %) nasal spray; 1 spray (137 mcg total) by Nasal route 2 (two) times daily.  Dispense: 30 mL; Refill: 3      HTN:  Controlled   ` continue Lisiopril     *Also seen by Dr. Abbott today  *Follow up with PCP in 8-12 weeks. Sooner if indicated.     Future Appointments   Date Time Provider Department Center   3/8/2023  2:00 PM Lele Sarkar MD [facility]   3/28/2023  9:00 AM Ady Cornell MD [facility]   6/5/2023  8:20 AM Jose Abbott MD [facility]        Medication List            Accurate as of March 3, 2023  2:29 PM. If you have any questions, ask your nurse or doctor.                START taking these medications      azelastine 137 mcg (0.1 %) nasal spray  Commonly known as: ASTELIN  1 spray (137 mcg total) by Nasal  route 2 (two) times daily.  Started by: KACEY Nunez     promethazine 12.5 MG Tab  Commonly known as: PHENERGAN  Take 2 tablets (25 mg total) by mouth every 6 (six) hours as needed (Severe Nausea).  Started by: KACEY Nunez            CHANGE how you take these medications      omeprazole 40 MG capsule  Commonly known as: PRILOSEC  Take 1 capsule (40 mg total) by mouth 2 (two) times daily before meals.  What changed: when to take this  Changed by: KACEY Nunez            CONTINUE taking these medications      acetaminophen 325 MG tablet  Commonly known as: TYLENOL     lisinopriL 40 MG tablet  Commonly known as: PRINIVIL,ZESTRIL  Take 1 tablet (40 mg total) by mouth once daily.     ondansetron 4 MG Tbdl  Commonly known as: ZOFRAN-ODT  Take 1 tablet (4 mg total) by mouth every 6 (six) hours as needed (naseau and vomiting).     sildenafiL 100 MG tablet  Commonly known as: VIAGRA  Take 1 tablet (100 mg total) by mouth as needed for Erectile Dysfunction.               Where to Get Your Medications        These medications were sent to [facility] [place] - [address]  [address] [place] [address]      Phone: [phone]   azelastine 137 mcg (0.1 %) nasal spray  omeprazole 40 MG capsule  promethazine 12.5 MG Tab         Signing Physician:  KACEY Nunez

## 2023-02-27 NOTE — TELEPHONE ENCOUNTER
----- Message from Teri Orourke sent at 2/27/2023 11:54 AM CST -----  Regarding: Results  Contact: [phone]  Pt is calling for results for EGD done on 032/20/23 and is questioning why appt on 03/08/23 was cancelled?  Please call.

## 2023-03-01 ENCOUNTER — TELEPHONE (OUTPATIENT)
Dept: ENDOSCOPY | Facility: HOSPITAL | Age: 68
End: 2023-03-01

## 2023-03-01 NOTE — TELEPHONE ENCOUNTER
----- Message from Ady Cornell MD sent at 3/1/2023  4:42 PM CST -----  Regarding: RE: Pt advice  Contact: Pt @ [phone]  Make sure he's taking his daily PPI. Over production of saliva may occur related to acidity. Daily omeprazole (looks like he has a prescription for this) should help. If the issue is increased nasal congestion, he should see his PCP.      ----- Message -----  From: Mary Lopez MA  Sent: 3/1/2023   4:04 PM CST  To: Ady Cornell MD  Subject: FW: Pt advice                                    PCP or ENT?  ----- Message -----  From: Micha Lawson  Sent: 3/1/2023   3:43 PM CST  To: [facility] Staff  Subject: Pt advice                                        Pt is over producing saliva and phlegm; is there something pt can do to minimize this because its causing pt to vomit and loose what fluid he is able to get down. Please call to advise. Thank you.

## 2023-03-03 ENCOUNTER — TELEPHONE (OUTPATIENT)
Dept: HEMATOLOGY/ONCOLOGY | Facility: CLINIC | Age: 68
End: 2023-03-03
Payer: MEDICARE

## 2023-03-03 ENCOUNTER — OFFICE VISIT (OUTPATIENT)
Dept: INTERNAL MEDICINE | Facility: CLINIC | Age: 68
End: 2023-03-03
Payer: MEDICARE

## 2023-03-03 VITALS
HEART RATE: 90 BPM | OXYGEN SATURATION: 96 % | SYSTOLIC BLOOD PRESSURE: 138 MMHG | BODY MASS INDEX: 21.36 KG/M2 | WEIGHT: 132.94 LBS | HEIGHT: 66 IN | DIASTOLIC BLOOD PRESSURE: 88 MMHG | RESPIRATION RATE: 16 BRPM

## 2023-03-03 DIAGNOSIS — R09.81 NASAL CONGESTION: ICD-10-CM

## 2023-03-03 DIAGNOSIS — I10 PRIMARY HYPERTENSION: Chronic | ICD-10-CM

## 2023-03-03 DIAGNOSIS — R11.0 NAUSEA: ICD-10-CM

## 2023-03-03 DIAGNOSIS — K22.89 ESOPHAGEAL MASS: ICD-10-CM

## 2023-03-03 DIAGNOSIS — C83.30 DIFFUSE LARGE B-CELL LYMPHOMA, UNSPECIFIED BODY REGION: Primary | ICD-10-CM

## 2023-03-03 PROCEDURE — 99214 PR OFFICE/OUTPT VISIT, EST, LEVL IV, 30-39 MIN: ICD-10-PCS | Mod: HCNC,S$GLB,, | Performed by: NURSE PRACTITIONER

## 2023-03-03 PROCEDURE — 3079F DIAST BP 80-89 MM HG: CPT | Mod: HCNC,CPTII,S$GLB, | Performed by: NURSE PRACTITIONER

## 2023-03-03 PROCEDURE — 3288F PR FALLS RISK ASSESSMENT DOCUMENTED: ICD-10-PCS | Mod: HCNC,CPTII,S$GLB, | Performed by: NURSE PRACTITIONER

## 2023-03-03 PROCEDURE — 3075F PR MOST RECENT SYSTOLIC BLOOD PRESS GE 130-139MM HG: ICD-10-PCS | Mod: HCNC,CPTII,S$GLB, | Performed by: NURSE PRACTITIONER

## 2023-03-03 PROCEDURE — 3079F PR MOST RECENT DIASTOLIC BLOOD PRESSURE 80-89 MM HG: ICD-10-PCS | Mod: HCNC,CPTII,S$GLB, | Performed by: NURSE PRACTITIONER

## 2023-03-03 PROCEDURE — 3008F BODY MASS INDEX DOCD: CPT | Mod: HCNC,CPTII,S$GLB, | Performed by: NURSE PRACTITIONER

## 2023-03-03 PROCEDURE — 1101F PT FALLS ASSESS-DOCD LE1/YR: CPT | Mod: HCNC,CPTII,S$GLB, | Performed by: NURSE PRACTITIONER

## 2023-03-03 PROCEDURE — 3008F PR BODY MASS INDEX (BMI) DOCUMENTED: ICD-10-PCS | Mod: HCNC,CPTII,S$GLB, | Performed by: NURSE PRACTITIONER

## 2023-03-03 PROCEDURE — 3288F FALL RISK ASSESSMENT DOCD: CPT | Mod: HCNC,CPTII,S$GLB, | Performed by: NURSE PRACTITIONER

## 2023-03-03 PROCEDURE — 1111F PR DISCHARGE MEDS RECONCILED W/ CURRENT OUTPATIENT MED LIST: ICD-10-PCS | Mod: HCNC,CPTII,S$GLB, | Performed by: NURSE PRACTITIONER

## 2023-03-03 PROCEDURE — 99214 OFFICE O/P EST MOD 30 MIN: CPT | Mod: HCNC,S$GLB,, | Performed by: NURSE PRACTITIONER

## 2023-03-03 PROCEDURE — 1125F PR PAIN SEVERITY QUANTIFIED, PAIN PRESENT: ICD-10-PCS | Mod: HCNC,CPTII,S$GLB, | Performed by: NURSE PRACTITIONER

## 2023-03-03 PROCEDURE — 99999 PR PBB SHADOW E&M-EST. PATIENT-LVL III: CPT | Mod: PBBFAC,HCNC,, | Performed by: NURSE PRACTITIONER

## 2023-03-03 PROCEDURE — 1159F MED LIST DOCD IN RCRD: CPT | Mod: HCNC,CPTII,S$GLB, | Performed by: NURSE PRACTITIONER

## 2023-03-03 PROCEDURE — 1125F AMNT PAIN NOTED PAIN PRSNT: CPT | Mod: HCNC,CPTII,S$GLB, | Performed by: NURSE PRACTITIONER

## 2023-03-03 PROCEDURE — 3075F SYST BP GE 130 - 139MM HG: CPT | Mod: HCNC,CPTII,S$GLB, | Performed by: NURSE PRACTITIONER

## 2023-03-03 PROCEDURE — 1101F PR PT FALLS ASSESS DOC 0-1 FALLS W/OUT INJ PAST YR: ICD-10-PCS | Mod: HCNC,CPTII,S$GLB, | Performed by: NURSE PRACTITIONER

## 2023-03-03 PROCEDURE — 1111F DSCHRG MED/CURRENT MED MERGE: CPT | Mod: HCNC,CPTII,S$GLB, | Performed by: NURSE PRACTITIONER

## 2023-03-03 PROCEDURE — 1159F PR MEDICATION LIST DOCUMENTED IN MEDICAL RECORD: ICD-10-PCS | Mod: HCNC,CPTII,S$GLB, | Performed by: NURSE PRACTITIONER

## 2023-03-03 PROCEDURE — 99999 PR PBB SHADOW E&M-EST. PATIENT-LVL III: ICD-10-PCS | Mod: PBBFAC,HCNC,, | Performed by: NURSE PRACTITIONER

## 2023-03-03 RX ORDER — PROMETHAZINE HYDROCHLORIDE 12.5 MG/1
25 TABLET ORAL EVERY 6 HOURS PRN
Qty: 30 TABLET | Refills: 0 | Status: ON HOLD | OUTPATIENT
Start: 2023-03-03 | End: 2023-03-31 | Stop reason: SDUPTHER

## 2023-03-03 RX ORDER — AZELASTINE 1 MG/ML
1 SPRAY, METERED NASAL 2 TIMES DAILY
Qty: 30 ML | Refills: 3 | Status: SHIPPED | OUTPATIENT
Start: 2023-03-03 | End: 2024-03-02

## 2023-03-03 RX ORDER — OMEPRAZOLE 40 MG/1
40 CAPSULE, DELAYED RELEASE ORAL
Qty: 180 CAPSULE | Refills: 1 | Status: ON HOLD | OUTPATIENT
Start: 2023-03-03 | End: 2024-01-04

## 2023-03-03 NOTE — TELEPHONE ENCOUNTER
----- Message from Marlin Mai RN sent at 3/3/2023  1:45 PM CST -----  Flores,  See new lymphoma dx for this pt.    Eve  ----- Message -----  From: Mary Lopez MA  Sent: 3/2/2023   7:27 PM CST  To: Marlin Mai RN, Marga Trent RN    Please schedule  Thanks  ----- Message -----  From: Ady Cornell MD  Sent: 3/2/2023   5:10 PM CST  To: FRANCISCA Meyers- please arrange for heme/onc referral for new diagnosis of lymphoma ASAP. Thanks.

## 2023-03-03 NOTE — PHYSICIAN QUERY
PT Name: Jeremy Martinez  MR #: [id number]    DOCUMENTATION CLARIFICATION     CDS/: Lexus Arriaga RN, CDI            Contact information:[email or web address]   This form is a permanent document in the medical record.     Query Date: March 3, 2023    By submitting this query, we are merely seeking further clarification of documentation.  Please utilize your independent clinical judgment when addressing the question(s) below.    The medical record contains the following:  Pathology Findings Location in Medical Record   1. GASTRIC MASS BIOPSY:          -  Diffuse large B-cell lymphoma, on-germinal center subtype          -  High proliferation index (99% by Ki-67 immunostain)          -  Negative for CD30 co-expression by immunostain          -  TREY JANA positive for EBV-encoded small mRNAs in  few scattered   bystander lymphocytes          -   PENDING  (send-out): MYC tech only immunostain and Double/Triple   Hit Lymphoma FISH panel             with results to be reported in a separate supplemental and final diagnosis may be further classified at that time  Path 2/20       Please clarify the pathology findings.  [ X ] Pathology findings noted above are ruled in/confirmed as diagnoses   [  ] Pathology findings noted above are not confirmed as diagnoses   [  ] Pathology findings noted above are incidental   [  ] Other diagnosis (please specify): ___________   [  ] Clinically Undetermined     Please document in your progress notes daily for the duration of treatment until resolved and include in your discharge summary.    Form No. [id number]

## 2023-03-08 ENCOUNTER — OFFICE VISIT (OUTPATIENT)
Dept: HEMATOLOGY/ONCOLOGY | Facility: CLINIC | Age: 68
End: 2023-03-08
Payer: MEDICARE

## 2023-03-08 ENCOUNTER — LAB VISIT (OUTPATIENT)
Dept: LAB | Facility: HOSPITAL | Age: 68
End: 2023-03-08
Attending: INTERNAL MEDICINE
Payer: MEDICARE

## 2023-03-08 VITALS
WEIGHT: 130.38 LBS | HEART RATE: 92 BPM | HEIGHT: 66 IN | RESPIRATION RATE: 20 BRPM | OXYGEN SATURATION: 100 % | SYSTOLIC BLOOD PRESSURE: 157 MMHG | TEMPERATURE: 98 F | DIASTOLIC BLOOD PRESSURE: 98 MMHG | BODY MASS INDEX: 20.95 KG/M2

## 2023-03-08 DIAGNOSIS — D64.9 ANEMIA OF UNKNOWN ETIOLOGY: ICD-10-CM

## 2023-03-08 DIAGNOSIS — K22.89 ESOPHAGEAL MASS: Primary | ICD-10-CM

## 2023-03-08 DIAGNOSIS — K21.9 GASTROESOPHAGEAL REFLUX DISEASE, UNSPECIFIED WHETHER ESOPHAGITIS PRESENT: Chronic | ICD-10-CM

## 2023-03-08 DIAGNOSIS — R53.82 CHRONIC FATIGUE: ICD-10-CM

## 2023-03-08 DIAGNOSIS — Q99.8 OTHER SPECIFIED CHROMOSOME ABNORMALITIES: ICD-10-CM

## 2023-03-08 DIAGNOSIS — C83.38 DIFFUSE LARGE B-CELL LYMPHOMA OF LYMPH NODES OF MULTIPLE REGIONS: ICD-10-CM

## 2023-03-08 DIAGNOSIS — I10 PRIMARY HYPERTENSION: Chronic | ICD-10-CM

## 2023-03-08 DIAGNOSIS — E78.5 HYPERLIPIDEMIA, UNSPECIFIED HYPERLIPIDEMIA TYPE: ICD-10-CM

## 2023-03-08 LAB
ALBUMIN SERPL BCP-MCNC: 3.2 G/DL (ref 3.5–5.2)
ALP SERPL-CCNC: 51 U/L (ref 55–135)
ALT SERPL W/O P-5'-P-CCNC: 7 U/L (ref 10–44)
ANION GAP SERPL CALC-SCNC: 17 MMOL/L (ref 8–16)
AST SERPL-CCNC: 16 U/L (ref 10–40)
BASOPHILS # BLD AUTO: 0.03 K/UL (ref 0–0.2)
BASOPHILS NFR BLD: 0.5 % (ref 0–1.9)
BILIRUB SERPL-MCNC: 0.4 MG/DL (ref 0.1–1)
BUN SERPL-MCNC: 13 MG/DL (ref 8–23)
CALCIUM SERPL-MCNC: 9.1 MG/DL (ref 8.7–10.5)
CHLORIDE SERPL-SCNC: 104 MMOL/L (ref 95–110)
CO2 SERPL-SCNC: 23 MMOL/L (ref 23–29)
CREAT SERPL-MCNC: 0.9 MG/DL (ref 0.5–1.4)
DIFFERENTIAL METHOD: ABNORMAL
EOSINOPHIL # BLD AUTO: 0.1 K/UL (ref 0–0.5)
EOSINOPHIL NFR BLD: 2.3 % (ref 0–8)
ERYTHROCYTE [DISTWIDTH] IN BLOOD BY AUTOMATED COUNT: 15.2 % (ref 11.5–14.5)
EST. GFR  (NO RACE VARIABLE): >60 ML/MIN/1.73 M^2
GLUCOSE SERPL-MCNC: 69 MG/DL (ref 70–110)
HBV CORE AB SERPL QL IA: NORMAL
HBV SURFACE AG SERPL QL IA: NORMAL
HCT VFR BLD AUTO: 33.3 % (ref 40–54)
HGB BLD-MCNC: 10.4 G/DL (ref 14–18)
HIV 1+2 AB+HIV1 P24 AG SERPL QL IA: NORMAL
IMM GRANULOCYTES # BLD AUTO: 0.02 K/UL (ref 0–0.04)
IMM GRANULOCYTES NFR BLD AUTO: 0.4 % (ref 0–0.5)
LDH SERPL L TO P-CCNC: 407 U/L (ref 110–260)
LYMPHOCYTES # BLD AUTO: 0.7 K/UL (ref 1–4.8)
LYMPHOCYTES NFR BLD: 12.8 % (ref 18–48)
MCH RBC QN AUTO: 23.9 PG (ref 27–31)
MCHC RBC AUTO-ENTMCNC: 31.2 G/DL (ref 32–36)
MCV RBC AUTO: 76 FL (ref 82–98)
MONOCYTES # BLD AUTO: 0.5 K/UL (ref 0.3–1)
MONOCYTES NFR BLD: 8.6 % (ref 4–15)
NEUTROPHILS # BLD AUTO: 4.2 K/UL (ref 1.8–7.7)
NEUTROPHILS NFR BLD: 75.4 % (ref 38–73)
NRBC BLD-RTO: 0 /100 WBC
PLATELET # BLD AUTO: 347 K/UL (ref 150–450)
PMV BLD AUTO: 10 FL (ref 9.2–12.9)
POTASSIUM SERPL-SCNC: 3.7 MMOL/L (ref 3.5–5.1)
PROT SERPL-MCNC: 6.9 G/DL (ref 6–8.4)
RBC # BLD AUTO: 4.36 M/UL (ref 4.6–6.2)
RETICS/RBC NFR AUTO: 1 % (ref 0.4–2)
SODIUM SERPL-SCNC: 144 MMOL/L (ref 136–145)
URATE SERPL-MCNC: 13.1 MG/DL (ref 3.4–7)
WBC # BLD AUTO: 5.61 K/UL (ref 3.9–12.7)

## 2023-03-08 PROCEDURE — 99999 PR PBB SHADOW E&M-EST. PATIENT-LVL IV: ICD-10-PCS | Mod: PBBFAC,HCNC,, | Performed by: INTERNAL MEDICINE

## 2023-03-08 PROCEDURE — 80053 COMPREHEN METABOLIC PANEL: CPT | Mod: HCNC | Performed by: INTERNAL MEDICINE

## 2023-03-08 PROCEDURE — 84550 ASSAY OF BLOOD/URIC ACID: CPT | Mod: HCNC | Performed by: INTERNAL MEDICINE

## 2023-03-08 PROCEDURE — 99499 RISK ADDL DX/OHS AUDIT: ICD-10-PCS | Mod: HCNC,S$GLB,, | Performed by: INTERNAL MEDICINE

## 2023-03-08 PROCEDURE — 99499 UNLISTED E&M SERVICE: CPT | Mod: HCNC,S$GLB,, | Performed by: INTERNAL MEDICINE

## 2023-03-08 PROCEDURE — 87340 HEPATITIS B SURFACE AG IA: CPT | Mod: HCNC | Performed by: INTERNAL MEDICINE

## 2023-03-08 PROCEDURE — 3077F SYST BP >= 140 MM HG: CPT | Mod: HCNC,CPTII,S$GLB, | Performed by: INTERNAL MEDICINE

## 2023-03-08 PROCEDURE — 1111F DSCHRG MED/CURRENT MED MERGE: CPT | Mod: HCNC,CPTII,S$GLB, | Performed by: INTERNAL MEDICINE

## 2023-03-08 PROCEDURE — 82955 ASSAY OF G6PD ENZYME: CPT | Mod: HCNC | Performed by: INTERNAL MEDICINE

## 2023-03-08 PROCEDURE — 1101F PT FALLS ASSESS-DOCD LE1/YR: CPT | Mod: HCNC,CPTII,S$GLB, | Performed by: INTERNAL MEDICINE

## 2023-03-08 PROCEDURE — 99999 PR PBB SHADOW E&M-EST. PATIENT-LVL IV: CPT | Mod: PBBFAC,HCNC,, | Performed by: INTERNAL MEDICINE

## 2023-03-08 PROCEDURE — 3008F PR BODY MASS INDEX (BMI) DOCUMENTED: ICD-10-PCS | Mod: HCNC,CPTII,S$GLB, | Performed by: INTERNAL MEDICINE

## 2023-03-08 PROCEDURE — 3008F BODY MASS INDEX DOCD: CPT | Mod: HCNC,CPTII,S$GLB, | Performed by: INTERNAL MEDICINE

## 2023-03-08 PROCEDURE — 1125F PR PAIN SEVERITY QUANTIFIED, PAIN PRESENT: ICD-10-PCS | Mod: HCNC,CPTII,S$GLB, | Performed by: INTERNAL MEDICINE

## 2023-03-08 PROCEDURE — 3288F FALL RISK ASSESSMENT DOCD: CPT | Mod: HCNC,CPTII,S$GLB, | Performed by: INTERNAL MEDICINE

## 2023-03-08 PROCEDURE — 36415 COLL VENOUS BLD VENIPUNCTURE: CPT | Mod: HCNC | Performed by: INTERNAL MEDICINE

## 2023-03-08 PROCEDURE — 85045 AUTOMATED RETICULOCYTE COUNT: CPT | Mod: HCNC | Performed by: INTERNAL MEDICINE

## 2023-03-08 PROCEDURE — 99205 OFFICE O/P NEW HI 60 MIN: CPT | Mod: HCNC,S$GLB,, | Performed by: INTERNAL MEDICINE

## 2023-03-08 PROCEDURE — 1125F AMNT PAIN NOTED PAIN PRSNT: CPT | Mod: HCNC,CPTII,S$GLB, | Performed by: INTERNAL MEDICINE

## 2023-03-08 PROCEDURE — 3080F PR MOST RECENT DIASTOLIC BLOOD PRESSURE >= 90 MM HG: ICD-10-PCS | Mod: HCNC,CPTII,S$GLB, | Performed by: INTERNAL MEDICINE

## 2023-03-08 PROCEDURE — 87389 HIV-1 AG W/HIV-1&-2 AB AG IA: CPT | Mod: HCNC | Performed by: INTERNAL MEDICINE

## 2023-03-08 PROCEDURE — 1159F PR MEDICATION LIST DOCUMENTED IN MEDICAL RECORD: ICD-10-PCS | Mod: HCNC,CPTII,S$GLB, | Performed by: INTERNAL MEDICINE

## 2023-03-08 PROCEDURE — 3077F PR MOST RECENT SYSTOLIC BLOOD PRESSURE >= 140 MM HG: ICD-10-PCS | Mod: HCNC,CPTII,S$GLB, | Performed by: INTERNAL MEDICINE

## 2023-03-08 PROCEDURE — 1111F PR DISCHARGE MEDS RECONCILED W/ CURRENT OUTPATIENT MED LIST: ICD-10-PCS | Mod: HCNC,CPTII,S$GLB, | Performed by: INTERNAL MEDICINE

## 2023-03-08 PROCEDURE — 86704 HEP B CORE ANTIBODY TOTAL: CPT | Mod: HCNC | Performed by: INTERNAL MEDICINE

## 2023-03-08 PROCEDURE — 85025 COMPLETE CBC W/AUTO DIFF WBC: CPT | Mod: HCNC | Performed by: INTERNAL MEDICINE

## 2023-03-08 PROCEDURE — 3080F DIAST BP >= 90 MM HG: CPT | Mod: HCNC,CPTII,S$GLB, | Performed by: INTERNAL MEDICINE

## 2023-03-08 PROCEDURE — 3288F PR FALLS RISK ASSESSMENT DOCUMENTED: ICD-10-PCS | Mod: HCNC,CPTII,S$GLB, | Performed by: INTERNAL MEDICINE

## 2023-03-08 PROCEDURE — 83615 LACTATE (LD) (LDH) ENZYME: CPT | Mod: HCNC | Performed by: INTERNAL MEDICINE

## 2023-03-08 PROCEDURE — 1159F MED LIST DOCD IN RCRD: CPT | Mod: HCNC,CPTII,S$GLB, | Performed by: INTERNAL MEDICINE

## 2023-03-08 PROCEDURE — 99205 PR OFFICE/OUTPT VISIT, NEW, LEVL V, 60-74 MIN: ICD-10-PCS | Mod: HCNC,S$GLB,, | Performed by: INTERNAL MEDICINE

## 2023-03-08 PROCEDURE — 1101F PR PT FALLS ASSESS DOC 0-1 FALLS W/OUT INJ PAST YR: ICD-10-PCS | Mod: HCNC,CPTII,S$GLB, | Performed by: INTERNAL MEDICINE

## 2023-03-08 PROCEDURE — 83020 HEMOGLOBIN ELECTROPHORESIS: CPT | Mod: 91,HCNC | Performed by: INTERNAL MEDICINE

## 2023-03-08 RX ORDER — MORPHINE SULFATE ORAL SOLUTION 10 MG/5ML
15 SOLUTION ORAL EVERY 6 HOURS PRN
Qty: 500 ML | Refills: 0 | Status: SHIPPED | OUTPATIENT
Start: 2023-03-08 | End: 2023-03-13

## 2023-03-08 RX ORDER — ONDANSETRON HYDROCHLORIDE 8 MG/1
8 TABLET, FILM COATED ORAL EVERY 12 HOURS PRN
Qty: 30 TABLET | Refills: 2 | Status: ON HOLD | OUTPATIENT
Start: 2023-03-08 | End: 2023-03-31 | Stop reason: SDUPTHER

## 2023-03-08 RX ORDER — TRIAMCINOLONE ACETONIDE 40 MG/ML
INJECTION, SUSPENSION INTRA-ARTICULAR; INTRAMUSCULAR
COMMUNITY
Start: 2022-10-12

## 2023-03-08 RX ORDER — ALLOPURINOL 100 MG/1
300 TABLET ORAL DAILY
Qty: 30 TABLET | Refills: 2 | Status: SHIPPED | OUTPATIENT
Start: 2023-03-08 | End: 2023-04-17 | Stop reason: SDUPTHER

## 2023-03-08 RX ORDER — PREDNISONE 50 MG/1
100 TABLET ORAL SEE ADMIN INSTRUCTIONS
Qty: 60 TABLET | Refills: 0 | Status: SHIPPED | OUTPATIENT
Start: 2023-03-08 | End: 2023-09-05

## 2023-03-08 NOTE — PLAN OF CARE
START ON PATHWAY REGIMEN - Lymphoma and CLL    [id number]        Prednisone       Rituximab-xxxx       Cyclophosphamide       Doxorubicin       Vincristine           Additional Orders: Per committee, hepatitis B and C screening   recommended prior to initiation of rituximab. Assess LVEF prior to initiation of   doxorubicin and as clinically indicated during and after treatment. Doxorubicin   can cause myocardial damage, including acute left ventricular failure. Risk of   cardiomyopathy is generally proportional to cumulative   anthracycline/anthracenedione exposure. Use of concomitant cardiotoxic agents,   previous radiotherapy to the mediastinum, or presence/history of cardiovascular   disease can additionally increase cardiomyopathy risk. See PI for details.   Infuse vincristine via minibag to reduce the risk of fatal medication errors due   to incorrect route of administration.    **Always confirm dose/schedule in your pharmacy ordering system**    Patient Characteristics:  Diffuse Large B-Cell Lymphoma or Follicular Lymphoma, Grade 3B, First Line,   Stage III and IV  Disease Type: Not Applicable  Disease Type: Diffuse Large B-Cell Lymphoma  Disease Type: Not Applicable  Line of therapy: First Line  Intent of Therapy:  Curative Intent, Discussed with Patient

## 2023-03-08 NOTE — PLAN OF CARE
DISCONTINUE ON PATHWAY REGIMEN - Lymphoma and CLL    [id number]        Prednisone       Rituximab-xxxx       Cyclophosphamide       Doxorubicin       Vincristine           Additional Orders: Per committee, hepatitis B and C screening   recommended prior to initiation of rituximab. Assess LVEF prior to initiation of   doxorubicin and as clinically indicated during and after treatment. Doxorubicin   can cause myocardial damage, including acute left ventricular failure. Risk of   cardiomyopathy is generally proportional to cumulative   anthracycline/anthracenedione exposure. Use of concomitant cardiotoxic agents,   previous radiotherapy to the mediastinum, or presence/history of cardiovascular   disease can additionally increase cardiomyopathy risk. See PI for details.   Infuse vincristine via minibag to reduce the risk of fatal medication errors due   to incorrect route of administration.    **Always confirm dose/schedule in your pharmacy ordering system**    REASON: Other Reason  PRIOR TREATMENT: [id number]  TREATMENT RESPONSE: Unable to Evaluate    START ON PATHWAY REGIMEN - Lymphoma and CLL    [id number]        Prednisone       Rituximab-xxxx       Cyclophosphamide       Doxorubicin       Vincristine           Additional Orders: Per committee, hepatitis B and C screening   recommended prior to initiation of rituximab. Assess LVEF prior to initiation of   doxorubicin and as clinically indicated during and after treatment. Doxorubicin   can cause myocardial damage, including acute left ventricular failure. Risk of   cardiomyopathy is generally proportional to cumulative   anthracycline/anthracenedione exposure. Use of concomitant cardiotoxic agents,   previous radiotherapy to the mediastinum, or presence/history of cardiovascular   disease can additionally increase cardiomyopathy risk. See PI for details.   Infuse vincristine via minibag to reduce the risk of fatal medication errors due   to incorrect route of  administration.    **Always confirm dose/schedule in your pharmacy ordering system**    Patient Characteristics:  Diffuse Large B-Cell Lymphoma or Follicular Lymphoma, Grade 3B, First Line,   Stage III and IV  Disease Type: Not Applicable  Disease Type: Diffuse Large B-Cell Lymphoma  Disease Type: Not Applicable  Line of therapy: First Line  Intent of Therapy:  Curative Intent, Discussed with Patient

## 2023-03-08 NOTE — PROGRESS NOTES
CC: DLBCL, hematology consultation    HPI: , 68, M, is here for hematology consultation for newly diagnosed diffuse large B cell lymphoma. He has GERD, HLD, HTn. He presented to outside hospital in 2023 with epigastric pain.  He was found to have an esophageal mass and was transferred to [facility] [place] for further care. He underwent endoscopy with biopsy of GI mass . He remained stable on liquid diet.     He has lost ~30 lbs in the past 6 months. Denies fevers, night sweats, upper/lower GI bleeding. He is constipated.     Past Medical History:   Diagnosis Date    Erectile dysfunction     GERD (gastroesophageal reflux disease)     Hyperlipidemia     Hypertension                Past Surgical History:   Procedure Laterality Date    COLONOSCOPY N/A 2017    Procedure: COLONOSCOPY;  Surgeon: AMBER Restrepo MD;  Location: [facility] ENDO (4TH FLR);  Service: Endoscopy;  Laterality: N/A;    ENDOSCOPIC ULTRASOUND OF UPPER GASTROINTESTINAL TRACT N/A 2023    Procedure: ULTRASOUND, UPPER GI TRACT, ENDOSCOPIC;  Surgeon: Ady Cornell MD;  Location: [facility] ENDO (2ND FLR);  Service: Endoscopy;  Laterality: N/A;    ESOPHAGOGASTRODUODENOSCOPY N/A 2023    Procedure: EGD (ESOPHAGOGASTRODUODENOSCOPY);  Surgeon: Ady Cornell MD;  Location: [facility] ENDO (2ND FLR);  Service: Endoscopy;  Laterality: N/A;    TOTAL KNEE ARTHROPLASTY  2010    right         Family History   Problem Relation Age of Onset    Diabetes Mother     Stroke Father     Kidney disease Son         transplant    Cancer Neg Hx     Colon cancer Neg Hx     Colon polyps Neg Hx     Esophageal cancer Neg Hx     Stomach cancer Neg Hx     Rectal cancer Neg Hx     Ulcerative colitis Neg Hx     Crohn's disease Neg Hx        Social History     Socioeconomic History    Marital status:    Tobacco Use    Smoking status: Former     Years: 10.00     Types: Cigarettes     Quit date: 2000     Years since quittin.6    Smokeless tobacco: Never    Substance and Sexual Activity    Alcohol use: Yes     Comment: beers 5 d per week, 3 at a time    Drug use: No   Social History Narrative     (in gen healthy), 5 kids. 4 grandkids. 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 No formal exercise but active. 1 son living with him.       Review of patient's allergies indicates:   Allergen Reactions    Amoxicillin Hives and Rash       Current Outpatient Medications   Medication Sig    acetaminophen (TYLENOL) 325 MG tablet Take 325 mg by mouth every 6 (six) hours as needed for Pain.    azelastine (ASTELIN) 137 mcg (0.1 %) nasal spray 1 spray (137 mcg total) by Nasal route 2 (two) times daily.    lisinopriL (PRINIVIL,ZESTRIL) 40 MG tablet Take 1 tablet (40 mg total) by mouth once daily.    omeprazole (PRILOSEC) 40 MG capsule Take 1 capsule (40 mg total) by mouth 2 (two) times daily before meals.    promethazine (PHENERGAN) 12.5 MG Tab Take 2 tablets (25 mg total) by mouth every 6 (six) hours as needed (Severe Nausea).    sildenafiL (VIAGRA) 100 MG tablet Take 1 tablet (100 mg total) by mouth as needed for Erectile Dysfunction.    KENALOG 40 mg/mL injection      No current facility-administered medications for this visit.          Review of Systems   Constitutional:  Positive for malaise/fatigue and weight loss. Negative for chills, diaphoresis and fever.   HENT:  Negative for congestion, ear pain, hearing loss, nosebleeds, sinus pain and tinnitus.    Eyes:  Negative for blurred vision, double vision, photophobia, pain and discharge.   Respiratory:  Negative for cough, hemoptysis and sputum production.    Cardiovascular:  Negative for palpitations, orthopnea, claudication and leg swelling.   Gastrointestinal:  Positive for abdominal pain, constipation, heartburn and nausea. Negative for blood in stool, diarrhea, melena and vomiting.   Genitourinary:  Negative for dysuria, frequency, hematuria and urgency.   Musculoskeletal:  Negative for back pain, myalgias and neck pain.    Neurological:  Negative for tingling, tremors, focal weakness, weakness and headaches.   Endo/Heme/Allergies:  Negative for environmental allergies.   Psychiatric/Behavioral:  Negative for depression, hallucinations and substance abuse. The patient is not nervous/anxious and does not have insomnia.           Vitals:    03/08/23 1326   BP: (!) 157/98   Pulse: 92   Resp: 20   Temp: 98.4 °F (36.9 °C)     PS: ECOG 2    Physical Exam  Constitutional:       Appearance: Normal appearance.   HENT:      Head: Normocephalic and atraumatic.      Mouth/Throat:      Pharynx: No posterior oropharyngeal erythema.   Eyes:      General: No scleral icterus.  Cardiovascular:      Rate and Rhythm: Normal rate and regular rhythm.      Heart sounds: No murmur heard.  Pulmonary:      Effort: Pulmonary effort is normal. No respiratory distress.      Breath sounds: Normal breath sounds. No rhonchi.   Abdominal:      General: There is no distension.      Palpations: There is no mass.      Tenderness: There is no abdominal tenderness.   Musculoskeletal:         General: No swelling.   Lymphadenopathy:      Cervical: No cervical adenopathy.   Skin:     Coloration: Skin is not jaundiced.   Neurological:      General: No focal deficit present.      Mental Status: He is alert and oriented to person, place, and time.      Cranial Nerves: No cranial nerve deficit.        2/18/23 CT abdomen pelvis with contrast    COMPARISON:  None     FINDINGS:  Abdomen:     - Lung bases: There is focal emphysematous change in the medial left lung base.  Lung bases contain mild areas of peripheral chronic atelectasis.  A focus of suspected atelectasis simulates a nodular opacity at the right dome of the diaphragm.     There is distal esophageal distension with fluid secondary to and infiltrative type mass involving the visualized cardiac portion of the stomach extending to the GE junction concerning for malignancy.  There is a mantle of lobular adenopathy  surrounding the proximal abdominal aorta appearing to involve the retrocrural lymph nodes and adjacent periaortic and pericaval lymph nodes to the level of the left renal vein with the renal a vein somewhat displaced.  No visible renal vein invasion/thrombosis.  Smaller shotty type lymph nodes are seen caudal to the renal veins in the retroperitoneum.     - Liver: The liver appears homogeneous and not significantly enlarged.     - Gallbladder: There is no CT evidence of cholecystitis or cholelithiasis.     - Bile Ducts: No evidence of intra or extra hepatic biliary ductal dilation.     - Spleen: Negative.     - Kidneys: The kidneys enhance symmetrically and homogeneously.     - Adrenals: Unremarkable.     - Pancreas: Pancreas is displaced anteriorly by the retroperitoneal adenopathy with an ill-defined mass around 3 cm in diameter in the body appearing contiguous with the pancreatic parenchyma and similar in density to the retroperitoneal adenopathy consistent with involvement/invasion.  Additionally there is no fat plane margin between the posterior pancreatic body and the lobular retroperitoneal mantle of adenopathy.  Heterogeneous enhancement of the body of the pancreas in this region and poor visualization of the splenic artery and vein within the body suggests vessel encasement/involvement.  Partial thrombosis of portions of these vessels is suspected.     -Vascular: No abdominal aortic aneurysm or significant atherosclerosis.     - Abdominal wall:  There is a small right inguinal hernia.     Small bowel and colon: There is no small bowel distension.  There is no mesenteric convincing adenopathy.  Shotty lymph nodes are noted.  No extraluminal free air or free fluid is seen in the abdomen or pelvis.  Appendix is not isolated as a separate structure however there is no evidence of appendicitis.  Few scattered diverticuli are seen distally in the colon.  No diverticulitis.     Pelvis:     There is no pelvic mass,  adenopathy, or free fluid.  Bladder is unremarkable noting mild thickening of the mucosa can be explained by under distension.  The prostate is borderline mildly prominent.     Bones:  No acute osseous abnormality and no suspicious lytic or blastic lesion.     Impression:     Bulky infiltrative type mass compatible with malignancy involving the gastric cardiac portion extending to the GE junction with esophageal high-grade obstruction .  Further evaluation can be made with CT chest.     Bulky adenopathy in the retroperitoneum in the periaortic and pericaval region to the level of the left renal vein also appearing to involve the dorsal pancreatic body and also appearing to involve and obstruct the splenic artery and vein as described.     Incidental additional nonacute findings are discussed in the body of the report.       2/20/23 upper GI EUS     --One extrinsic moderate stenosis was found at the gastroesophageal junction. The stenosis was traversed.      --  A large, fungating and infiltrative mass with oozing bleeding was found in the cardia, in the gastric fundus and in the gastric body.        --Biopsies were taken with a cold forceps for histology.        --The examined duodenum was normal.          ENDOSONOGRAPHIC FINDING:          -- The esophagus and adjacent structures were visualized endosonographically.      --  A hypoechoic mass was identified endosonographically in the cardia of the stomach and in the fundus of the stomach (seen from the GE  junction, as EUS scope could not traverse the area of extrinsic        stenosis). The mass measured 52 mm by 62 mm in maximal cross-sectional diameter.     Impression:            - Extrinsic narrowing of the esophagus.                          - Gastric tumor in the cardia, in the gastric fundus and in the gastric body. Biopsied.                          - Normal examined duodenum.         1. GASTRIC MASS BIOPSY:            -  Diffuse large B-cell lymphoma,  non-germinal center subtype          -  High proliferation index (99% by Ki-67 immunostain)          -  Negative for CD30 co-expression by immunostain          -  TREY JANA positive for EBV-encoded small mRNAs in  few scattered   bystander lymphocytes          -   PENDING  (send-out): MYC tech only immunostain and Double/Triple Hit Lymphoma FISH panel with results to be reported in a separate supplemental and final diagnosis may be further classified             at that time   COMMENT: No flow cytometric analysis was performed on this specimen    Low and high power examination reveal minute irregular fragments of gastric tissue with an atypical diffuse submucosal infiltrate with focal crush artifacts and composed predominantly of large lymphoma cells with occasional   prominent central nucleoli and focal areas with increased scattered mature eosinophils and rarer scattered neutrophils, histiocytes, small mature lymphocytes, and plasma cells.   AE1/AE3, Synaptophysin, and Chromogranin immunostains were performed with adequate controls on block 1A by the referring surgical pathologist who   initially reviewed this biopsy and are negative for carcinoma or infiltrating neuroendocrine cells.   Immunohistochemical study with stains for CD3, CD5, BCL-2, CD20, CD23, Cyclin D1, CD10, BCL-6, MUM-1, CD30, and Ki-67 and EBV-encoded small mRNAs in-situ hybridization (TREY JANA) were performed on block 1A with appropriate controls   for increased sensitivity and cellular localization within the cells of interest.  Unstained slides of block 1A were sent out to [facility]   Laboratories for MYC tech only immunostain with results to be interpreted at [facility] and Double/Triple Hit Lymphoma FISH panel.   The large lymphoma cells are positive for CD20 and MUM-1 (nuclear) and negative for AE1/AE3, Synaptophysin, Chromogranin, CD3, CD5, CD23, Cyclin D1   (nuclear), CD10 (dim <30%), BCL-6 (strong nuclear <30%),  and CD30. Ki-67 (nuclear) highlights a high proliferation index (99%). TREY JANA stains few scattered small and medium-sized bystander lymphocytes.   AE1/A3 stains mucosal and glandular epithelial cells. Synaptophysin and/or Chromogranin stain a rare subset of normal neuroendocrine cells associated   with the glandular epithelial cells.   CD3, CD5, and BCL-2 co-stain numerous scattered reactive T-cells within the focal non-involved areas of the biopsy. CD5 also appears to stain glandular   epithelial cells and a rare subset of the mucosal epithelial cells. Cyclin D1 (nuclear) stains epithelial and endothelial cells. CD10 stains stromal cells and few scattered neutrophils. Rare scattered medium-sized CD30+ cells (favor   reactive immunoblasts) are identified      Component      Latest Ref Rng & Units 2/22/2023 2/19/2023 2/18/2023   WBC      3.90 - 12.70 K/uL 5.99     RBC      4.60 - 6.20 M/uL 3.89 (L)     Hemoglobin      14.0 - 18.0 g/dL 9.1 (L)     Hematocrit      40.0 - 54.0 % 29.6 (L)     MCV      82 - 98 fL 76 (L)     MCH      27.0 - 31.0 pg 23.4 (L)     MCHC      32.0 - 36.0 g/dL 30.7 (L)     RDW      11.5 - 14.5 % 14.5     Platelets      150 - 450 K/uL 335     MPV      9.2 - 12.9 fL 9.7     Immature Granulocytes      0.0 - 0.5 % 0.2     Gran # (ANC)      1.8 - 7.7 K/uL 4.3     Immature Grans (Abs)      0.00 - 0.04 K/uL 0.01     Lymph #      1.0 - 4.8 K/uL 1.0     Mono #      0.3 - 1.0 K/uL 0.6     Eos #      0.0 - 0.5 K/uL 0.1     Baso #      0.00 - 0.20 K/uL 0.02     nRBC      0 /100 WBC 0     Gran %      38.0 - 73.0 % 71.9     Lymph %      18.0 - 48.0 % 16.9 (L)     Mono %      4.0 - 15.0 % 9.2     Eosinophil %      0.0 - 8.0 % 1.5     Basophil %      0.0 - 1.9 % 0.3     Differential Method       Automated     Sodium      136 - 145 mmol/L 139  137   Potassium      3.5 - 5.1 mmol/L 3.7  3.7   Chloride      95 - 110 mmol/L 105  101   CO2      23 - 29 mmol/L 20 (L)  22 (L)   Glucose      70 - 110 mg/dL 60 (L)  94    BUN      8 - 23 mg/dL 13  7 (L)   Creatinine      0.5 - 1.4 mg/dL 0.8  1.0   Calcium      8.7 - 10.5 mg/dL 8.8  9.4   PROTEIN TOTAL      6.0 - 8.4 g/dL   7.1   Albumin      3.5 - 5.2 g/dL   3.5   BILIRUBIN TOTAL      0.1 - 1.0 mg/dL   0.4   Alkaline Phosphatase      55 - 135 U/L   61   AST      10 - 40 U/L   16   ALT      10 - 44 U/L   8 (L)   Anion Gap      8 - 16 mmol/L 14  14   eGFR      >60 mL/min/1.73 m:2 >60.0  >60.0   Specimen UA         Urine, Clean Catch   Color, UA      Yellow, Straw, Pam   Yellow   Appearance, UA      Clear   Hazy (A)   pH, UA      5.0 - 8.0   8.0   Specific Gravity, UA      1.005 - 1.030   1.010   Protein, UA      Negative   Trace (A)   Glucose, UA      Negative   Negative   Ketones, UA      Negative   Trace (A)   Bilirubin (UA)      Negative   Negative   Occult Blood UA      Negative   Negative   NITRITE UA      Negative   Negative   UROBILINOGEN UA      Negative EU/dL   Negative   Leukocytes, UA      Negative   Negative   Iron      45 - 160 ug/dL  27 (L)    Transferrin      200 - 375 mg/dL  147 (L)    TIBC      250 - 450 ug/dL  218 (L)    Saturated Iron      20 - 50 %  12 (L)    Ferritin      20.0 - 300.0 ng/mL  235        Assessment:    1. DLBCL, non-GC type of multiple sites  2. DLBCL of stomach  3. Fatigue  4. Microcytic anemia  5. Htn, primary  6. HLD  7.GERD  8. Cancer associated pain  9. Constipation  10. Weight loss  11. Malnutrition      Plan:    1,2: I discussed the diagnosis, prognosis of diffuse large B cell lymphoma. I explained that the treatment is with multi-agent chemotherapy. Staging with PETCT, BM biopsy.  FISH pending. Ki 67 high. He will have CBC, CMP, LDH, uric acid, G6PD, HBV, HIV serology checked. He will start allopurinol 300mg daily to prevent tumor lysis syndrome.  He will have ECHO , PET CT, bone amrrow biopsy done, and port placed . He will likely start chemotherapy with RCHOp or R-bill-CHP.     4. TIBC low, ferritin normal,. Saturated iron 12% on  2/19/23. He will have hemoglobin electrophoresis checked.   Treatment is with curative intent.       5,6: He follows with     7. He is on omeprazole BID    8. He will take morphine 15mg liquid by mouth as needed, q 6hour. He is aware of the risks associated with morphine, including drowsiness, risk of falls, and constipation. He uis aware he cannot drive if he is using morphine.      9. He has poor oral intake. He has bowel sounds on examination. He will try miralax.     10, 11. Secondary to poor oral intake. He is on liquid diet. Encouraged use of ENSURE three times daily.             BMT Chart Routing      Follow up with physician 2 weeks. ermias izquierdo on day of chemo   Follow up with BRIAN    Provider visit type    Infusion scheduling note start CHOP without rituxan asap   Injection scheduling note    Labs CBC, CMP, LDH, uric acid, other and reticulocytes   Lab interval:  G6PD, hemoglobin electrophoresis, HIV, hepatitis B  antigen, hepatitis B core antibody today   Imaging PET scan and ECHO   pet ct, echo asap; IR referral for port   Pharmacy appointment    Other referrals

## 2023-03-09 LAB
G6PD RBC-CCNT: 10.3 U/G HB (ref 8–11.9)
HB ELECTROPHORESIS INTERP CANCEL: NORMAL
HB ELECTROPHORESIS INTERPRETATION: NORMAL
HGB A MFR BLD ELPH: 97.2 % (ref 95.8–98)
HGB A2 MFR BLD: 2.4 % (ref 2–3.3)
HGB A2+XXX MFR BLD ELPH: NORMAL %
HGB F MFR BLD: 0.4 % (ref 0–0.9)
HGB XXX MFR BLD ELPH: NORMAL %
HPLC HB VARIANT: NORMAL

## 2023-03-10 DIAGNOSIS — C83.38 DIFFUSE LARGE B-CELL LYMPHOMA OF LYMPH NODES OF MULTIPLE REGIONS: Primary | ICD-10-CM

## 2023-03-11 PROBLEM — R10.13 EPIGASTRIC ABDOMINAL PAIN: Status: ACTIVE | Noted: 2023-03-11

## 2023-03-13 ENCOUNTER — TELEPHONE (OUTPATIENT)
Dept: HEMATOLOGY/ONCOLOGY | Facility: CLINIC | Age: 68
End: 2023-03-13

## 2023-03-13 LAB
COMMENT: NORMAL
FINAL PATHOLOGIC DIAGNOSIS: NORMAL
GROSS: NORMAL
Lab: NORMAL
MICROSCOPIC EXAM: NORMAL
SUPPLEMENTAL DIAGNOSIS: NORMAL

## 2023-03-13 RX ORDER — HYDROCODONE BITARTRATE AND ACETAMINOPHEN 7.5; 325 MG/15ML; MG/15ML
15 SOLUTION ORAL EVERY 6 HOURS PRN
Qty: 472 ML | Refills: 0 | Status: ON HOLD | OUTPATIENT
Start: 2023-03-13 | End: 2023-03-31 | Stop reason: HOSPADM

## 2023-03-13 RX ORDER — HEPARIN 100 UNIT/ML
500 SYRINGE INTRAVENOUS
Status: CANCELLED | OUTPATIENT
Start: 2023-03-29

## 2023-03-13 RX ORDER — MEPERIDINE HYDROCHLORIDE 50 MG/ML
25 INJECTION INTRAMUSCULAR; INTRAVENOUS; SUBCUTANEOUS
Status: CANCELLED | OUTPATIENT
Start: 2023-03-13 | End: 2023-03-14

## 2023-03-13 RX ORDER — DOXORUBICIN HYDROCHLORIDE 2 MG/ML
50 INJECTION, SOLUTION INTRAVENOUS
Status: CANCELLED | OUTPATIENT
Start: 2023-03-13

## 2023-03-13 RX ORDER — SODIUM CHLORIDE 0.9 % (FLUSH) 0.9 %
10 SYRINGE (ML) INJECTION
Status: CANCELLED | OUTPATIENT
Start: 2023-03-29

## 2023-03-13 NOTE — TELEPHONE ENCOUNTER
----- Message from Sami Limon sent at 3/13/2023 10:10 AM CDT -----  Regarding: Medication  Patient  Wife is requesting a callback regarding having issued receiving Pt Medication. Please give the patient a callback at [phone]

## 2023-03-13 NOTE — TELEPHONE ENCOUNTER
Wife reports [facility] will not have liquid morphine until Wednesday. Pt seen in ED for pain over the weekend and was given an Rx for Lortab Elixir  mg, but wife has been unable to find pharmacy that carries medication. Wife was told by a pharmacist at [facility] that Lortab Elixir should be 7.5-325 mg. Wife requesting new Rx for Lortab Elixir 7.5-325 mg. Advised wife if pt takes Lortab then he cannot take morphine. Wife verbalized understanding. Wife inquiring if pt should begin allopurinol at this time. Advised will speak to Dr. Sarkar and be in touch.    Discussed all of the above with Dr. Sarkar who will write Rx for Lortab Elixir 7/5-325 mg. Per Dr. Sarkar, pt should not take morphine if starting Lortab. Pt should also start allopurinol at this time per Dr. Sarkar.    Notified wife of Dr. Sarkar's statements. Wife verbalized understanding.

## 2023-03-17 ENCOUNTER — HOSPITAL ENCOUNTER (OUTPATIENT)
Facility: OTHER | Age: 68
Discharge: HOME | End: 2023-03-17
Attending: RADIOLOGY | Admitting: RADIOLOGY
Payer: MEDICARE

## 2023-03-17 VITALS
OXYGEN SATURATION: 100 % | TEMPERATURE: 98 F | DIASTOLIC BLOOD PRESSURE: 85 MMHG | SYSTOLIC BLOOD PRESSURE: 130 MMHG | HEART RATE: 85 BPM | RESPIRATION RATE: 16 BRPM

## 2023-03-17 DIAGNOSIS — C83.38 DIFFUSE LARGE B-CELL LYMPHOMA OF LYMPH NODES OF MULTIPLE REGIONS: ICD-10-CM

## 2023-03-17 PROCEDURE — 99152 MOD SED SAME PHYS/QHP 5/>YRS: CPT | Mod: HCNC | Performed by: RADIOLOGY

## 2023-03-17 PROCEDURE — C1788 PORT, INDWELLING, IMP: HCPCS | Mod: HCNC | Performed by: RADIOLOGY

## 2023-03-17 PROCEDURE — C1894 INTRO/SHEATH, NON-LASER: HCPCS | Mod: HCNC | Performed by: RADIOLOGY

## 2023-03-17 PROCEDURE — 25000003 PHARM REV CODE 250: Mod: HCNC | Performed by: RADIOLOGY

## 2023-03-17 PROCEDURE — 63600175 PHARM REV CODE 636 W HCPCS: Mod: HCNC | Performed by: RADIOLOGY

## 2023-03-17 PROCEDURE — 99153 MOD SED SAME PHYS/QHP EA: CPT | Mod: HCNC | Performed by: RADIOLOGY

## 2023-03-17 DEVICE — POWERPORT CLEARVUE IMPLANTABLE PORT WITH ATTACHABLE 8F POLYURETHANE OPEN-ENDED SINGLE-LUMEN VENOUS CATHETER INTERMEDIATE KIT
Type: IMPLANTABLE DEVICE | Site: CHEST | Status: FUNCTIONAL
Brand: POWERPORT CLEARVUE

## 2023-03-17 RX ORDER — HEPARIN SODIUM 1000 [USP'U]/ML
INJECTION, SOLUTION INTRAVENOUS; SUBCUTANEOUS
Status: DISCONTINUED | OUTPATIENT
Start: 2023-03-17 | End: 2023-03-17 | Stop reason: HOSPADM

## 2023-03-17 RX ORDER — LIDOCAINE HYDROCHLORIDE 10 MG/ML
INJECTION INFILTRATION; PERINEURAL
Status: DISCONTINUED | OUTPATIENT
Start: 2023-03-17 | End: 2023-03-17 | Stop reason: HOSPADM

## 2023-03-17 RX ORDER — MIDAZOLAM HYDROCHLORIDE 1 MG/ML
INJECTION INTRAMUSCULAR; INTRAVENOUS
Status: DISCONTINUED | OUTPATIENT
Start: 2023-03-17 | End: 2023-03-17 | Stop reason: HOSPADM

## 2023-03-17 RX ORDER — FENTANYL CITRATE 50 UG/ML
INJECTION, SOLUTION INTRAMUSCULAR; INTRAVENOUS
Status: DISCONTINUED | OUTPATIENT
Start: 2023-03-17 | End: 2023-03-17 | Stop reason: HOSPADM

## 2023-03-17 NOTE — PROCEDURES
Interventional Radiology Immediate Post-Procedure Note    Pre-Op Diagnosis: Lymphoma  Post-Op Diagnosis: Same    Procedure: Chest port placement    Procedure performed by: Will Corrales MD  Assistants: None    Estimated Blood Loss: Minimal  Specimen Removed: None    Findings/description of procedure:  8-Fr BARD CLEARVUE chest port placed via patent RIGHT IJ vein. Tip near the superior cavoatrial jxn.    No immediate complications. Patient tolerated procedure well. Please see full dictated procedure report for additional details and recommendations.    Recommendations:  Port is ready for immediate use.      Will Corrales MD  [facility]  Pager [phone]

## 2023-03-17 NOTE — DISCHARGE INSTRUCTIONS
Home Instructions for Port Care    BATHING:  Keep the operation site dry for 1 week  Today you may only sponge bathe  You may shower tomorrow with the dressing covered    DRESSING:  Remove the dressing after 48 hours and then leave open to the air  Cover the site with a bandage if it might (for example, if you'll be doing yard work)  Remove your bandage as instructed.  If there are skin tapes over the incision, leave them in place and wash over them.  You should wash the site with soap and water, but do not soak the arm in the tub until completely healed. The skin tapes should fall off in 7-10 days. If they have not come off by themselves at that time, they can be removed easily in your shower or bath.    ACTIVITY:  If you have received sedation or an anesthetic, you may feel sleepy for several hours. Rest until you are more awake. Do not drive for 24 hours. Gradually resume your normal activities.    SPECIAL RESTRICTIONS:  Move the arm and hand frequently. Avoid lifting heavy objects or overusing the arm.    DIET:  At home, continue with liquids and if there is no nausea, you may resume your normal diet.    MEDICATIONS:  If you experience discomfort, take the medication you normally use for pain. If pain persists, please call us.    WHEN TO CALL THE DOCTOR:  Obvious bleeding (some dried blood over the incision is normal)  Redness or swelling in the arm.  Fever over 101 F (38.4 C)  Drainage (pus) from the wound  Persistent pain not relieved by pain medication

## 2023-03-17 NOTE — DISCHARGE SUMMARY
Interventional Radiology Short Stay Discharge Summary      Admit Date: 3/17/2023  Discharge Date: 03/17/2023     Hospital Course: Uneventful    Discharge Diagnosis: Lymphoma s/p chest port placement today    Discharge Condition: Stable    Discharge Disposition: Home    Diet: Resume prior diet    Activity: Activity as tolerated and no driving for today    Follow-up: With referring provider      [facility]  Pager [phone]

## 2023-03-17 NOTE — H&P
Interventional Radiology Pre-Procedure History & Physical      Chief Complaint/Reason for Referral: Lymphoma    History of Present Illness:  Jeremy Martinez is a 68 y.o. male who presents with lymphoma. Referred for chest port placement.    Past Medical History:   Diagnosis Date    Erectile dysfunction     GERD (gastroesophageal reflux disease) 2010    Hyperlipidemia 2008    Hypertension 2007     Past Surgical History:   Procedure Laterality Date    COLONOSCOPY N/A 8/1/2017    Procedure: COLONOSCOPY;  Surgeon: AMBER Restrepo MD;  Location: [facility] (4TH FLR);  Service: Endoscopy;  Laterality: N/A;    ENDOSCOPIC ULTRASOUND OF UPPER GASTROINTESTINAL TRACT N/A 2/20/2023    Procedure: ULTRASOUND, UPPER GI TRACT, ENDOSCOPIC;  Surgeon: Ady Cornell MD;  Location: [facility] (2ND FLR);  Service: Endoscopy;  Laterality: N/A;    ESOPHAGOGASTRODUODENOSCOPY N/A 2/20/2023    Procedure: EGD (ESOPHAGOGASTRODUODENOSCOPY);  Surgeon: Ady Cornell MD;  Location: [facility] (2ND FLR);  Service: Endoscopy;  Laterality: N/A;    TOTAL KNEE ARTHROPLASTY  2010    right       Allergies:   Review of patient's allergies indicates:   Allergen Reactions    Amoxicillin Hives and Rash        Home Meds:   Prior to Admission medications    Medication Sig Start Date End Date Taking? Authorizing Provider   allopurinoL (ZYLOPRIM) 100 MG tablet Take 3 tablets (300 mg total) by mouth once daily. 3/8/23  Yes Lele Sarkar MD   azelastine (ASTELIN) 137 mcg (0.1 %) nasal spray 1 spray (137 mcg total) by Nasal route 2 (two) times daily. 3/3/23 3/2/24 Yes KACEY Cervantes   lisinopriL (PRINIVIL,ZESTRIL) 40 MG tablet Take 1 tablet (40 mg total) by mouth once daily. 10/3/22  Yes Jose Abbott MD   omeprazole (PRILOSEC) 40 MG capsule Take 1 capsule (40 mg total) by mouth 2 (two) times daily before meals. 3/3/23 3/2/24 Yes KACEY Cervantes   ondansetron (ZOFRAN) 8 MG tablet Take 1 tablet (8 mg total) by mouth every 12 (twelve)  hours as needed for Nausea. 3/8/23 3/7/24 Yes Lele Sarkar MD   acetaminophen (TYLENOL) 325 MG tablet Take 325 mg by mouth every 6 (six) hours as needed for Pain.    Historical Provider   hydrocodone-acetaminophen (HYCET) solution 7.5-325 mg/15mL Take 15 mLs by mouth every 6 (six) hours as needed for Pain. 3/13/23   Lele Sarkar MD   HYDROcodone-acetaminophen (LORTAB ELIXIR)  mg/15 mL Soln Take 15 mLs by mouth every 4 (four) hours as needed (pain). 3/11/23   Ling Keith NP   KENALOG 40 mg/mL injection  10/12/22   Historical Provider   predniSONE (DELTASONE) 50 MG Tab Take 2 tablets (100 mg total) by mouth As instructed. Take 100 mg daily in the morning for 5 days once every 21 days 3/8/23   Lele Sarkar MD   promethazine (PHENERGAN) 12.5 MG Tab Take 2 tablets (25 mg total) by mouth every 6 (six) hours as needed (Severe Nausea). 3/3/23   KACEY Cervantes   sildenafiL (VIAGRA) 100 MG tablet Take 1 tablet (100 mg total) by mouth as needed for Erectile Dysfunction. 4/27/22   Jose Abbott MD       Anticoagulation/Antiplatelet Meds: no anticoagulation    Review of Systems:   Hematological: no known coagulopathies  Respiratory: no shortness of breath  Cardiovascular: no chest pain  Gastrointestinal: no abdominal pain  Genitourinary: no dysuria  Musculoskeletal: negative  Neurological: no TIA or stroke symptoms     Physical Exam:  Temp: 97.6 °F (36.4 °C) (03/17/23 0732)  Pulse: 79 (03/17/23 0732)  Resp: 16 (03/17/23 0732)  BP: (!) 123/91 (03/17/23 0732)  SpO2: 100 % (03/17/23 0732)    General: NAD  HEENT: Normocephalic, sclera anicteric, oropharynx clear  Neck: Supple, no palpable lymphadenopathy  Heart: RRR  Lungs: Symmetric excursions, breathing unlabored  Abd: NTND, soft  Extremities: LOZANO  Neuro: Gross nonfocal    Laboratory:  Lab Results   Component Value Date    INR 1.0 12/23/2020       Lab Results   Component Value Date    WBC 5.61 03/08/2023    HGB 10.4 (L) 03/08/2023    HCT 33.3 (L)  03/08/2023    MCV 76 (L) 03/08/2023     03/08/2023      Lab Results   Component Value Date    GLU 69 (L) 03/08/2023     03/08/2023    K 3.7 03/08/2023     03/08/2023    CO2 23 03/08/2023    BUN 13 03/08/2023    CREATININE 0.9 03/08/2023    CALCIUM 9.1 03/08/2023    ALT 7 (L) 03/08/2023    AST 16 03/08/2023    ALBUMIN 3.2 (L) 03/08/2023    BILITOT 0.4 03/08/2023       Assessment/Plan:  68 y.o. male with lymphoma. Will undergo chest port placement today.    Sedation:  Sedation history: have not been any systemic reactions  ASA: 3 / Mallampati: 2  Sedation plan: Up to moderate (Versed, fentanyl)     Risks (including, but not limited to, pain, bleeding, infection, damage to nearby structures, treatment failure/recurrence, and the need for additional procedures), potential benefits, and alternatives were discussed with the patient. All questions were answered to the best of my abilities. The patient wishes to proceed. Written informed consent was obtained.      [facility]  Pager [phone]

## 2023-03-21 ENCOUNTER — TELEPHONE (OUTPATIENT)
Dept: ENDOSCOPY | Facility: HOSPITAL | Age: 68
End: 2023-03-21
Payer: MEDICARE

## 2023-03-21 NOTE — TELEPHONE ENCOUNTER
----- Message from Ady Cornell MD sent at 3/21/2023  4:11 PM CDT -----  Regarding: RE: CONFRIM F/U  Contact: Wife  KRYSTINA does not need to see him. He needs to be seen by oncology.      ----- Message -----  From: Mary Lopez MA  Sent: 3/21/2023   1:52 PM CDT  To: Ady Cornell MD  Subject: FW: CONFRIM F/U                                  I assume we can cancel your appointment  ----- Message -----  From: Susan Guzman  Sent: 3/21/2023  12:30 PM CDT  To: [facility] Staff  Subject: CONFRIM F/U                                      Type: Patient Call Back         Who called: Patient's wife         What is the request in detail: calling to confirm that patient needs to be seen for f/u sched on 3/28, since Dr. Cornell referred him to the Oncology department; states if so, the appt will have to be changed to another time; please advise        Best call back number: [phone] - Hui         Additional Information:            Thank You

## 2023-03-22 ENCOUNTER — TELEPHONE (OUTPATIENT)
Dept: ENDOSCOPY | Facility: HOSPITAL | Age: 68
End: 2023-03-22
Payer: MEDICARE

## 2023-03-22 NOTE — TELEPHONE ENCOUNTER
----- Message from Saloni Vang sent at 3/22/2023  8:07 AM CDT -----  Regarding: pt advice  Contact: Hui(wife) [phone]  Pt returning missed call from nurse. Pls call.

## 2023-03-23 ENCOUNTER — HOSPITAL ENCOUNTER (OUTPATIENT)
Dept: RADIOLOGY | Facility: HOSPITAL | Age: 68
Discharge: HOME | End: 2023-03-23
Attending: INTERNAL MEDICINE
Payer: MEDICARE

## 2023-03-23 DIAGNOSIS — C83.38 DIFFUSE LARGE B-CELL LYMPHOMA OF LYMPH NODES OF MULTIPLE REGIONS: ICD-10-CM

## 2023-03-23 DIAGNOSIS — D64.9 ANEMIA OF UNKNOWN ETIOLOGY: ICD-10-CM

## 2023-03-23 DIAGNOSIS — R53.82 CHRONIC FATIGUE: ICD-10-CM

## 2023-03-23 LAB — POCT GLUCOSE: 98 MG/DL (ref 70–110)

## 2023-03-23 PROCEDURE — A9552 F18 FDG: HCPCS | Mod: HCNC

## 2023-03-23 PROCEDURE — 78815 NM PET CT ROUTINE: ICD-10-PCS | Mod: 26,PI,HCNC, | Performed by: RADIOLOGY

## 2023-03-23 PROCEDURE — 78815 PET IMAGE W/CT SKULL-THIGH: CPT | Mod: TC,HCNC,PI

## 2023-03-23 PROCEDURE — 78815 PET IMAGE W/CT SKULL-THIGH: CPT | Mod: 26,PI,HCNC, | Performed by: RADIOLOGY

## 2023-03-24 ENCOUNTER — PATIENT MESSAGE (OUTPATIENT)
Dept: ADMINISTRATIVE | Facility: OTHER | Age: 68
End: 2023-03-24
Payer: MEDICARE

## 2023-03-24 ENCOUNTER — TELEPHONE (OUTPATIENT)
Dept: HEMATOLOGY/ONCOLOGY | Facility: CLINIC | Age: 68
End: 2023-03-24
Payer: MEDICARE

## 2023-03-24 ENCOUNTER — OFFICE VISIT (OUTPATIENT)
Dept: HEMATOLOGY/ONCOLOGY | Facility: CLINIC | Age: 68
End: 2023-03-24
Payer: MEDICARE

## 2023-03-24 ENCOUNTER — INFUSION (OUTPATIENT)
Dept: INFUSION THERAPY | Facility: HOSPITAL | Age: 68
End: 2023-03-24
Payer: MEDICARE

## 2023-03-24 VITALS
OXYGEN SATURATION: 99 % | WEIGHT: 130.94 LBS | BODY MASS INDEX: 21.04 KG/M2 | TEMPERATURE: 98 F | RESPIRATION RATE: 16 BRPM | SYSTOLIC BLOOD PRESSURE: 140 MMHG | DIASTOLIC BLOOD PRESSURE: 82 MMHG | HEIGHT: 66 IN | HEART RATE: 86 BPM

## 2023-03-24 DIAGNOSIS — C83.38 DIFFUSE LARGE B-CELL LYMPHOMA OF LYMPH NODES OF MULTIPLE REGIONS: Primary | ICD-10-CM

## 2023-03-24 DIAGNOSIS — I10 PRIMARY HYPERTENSION: ICD-10-CM

## 2023-03-24 DIAGNOSIS — R53.82 CHRONIC FATIGUE: ICD-10-CM

## 2023-03-24 DIAGNOSIS — R11.2 CINV (CHEMOTHERAPY-INDUCED NAUSEA AND VOMITING): ICD-10-CM

## 2023-03-24 DIAGNOSIS — E78.5 HYPERLIPIDEMIA, UNSPECIFIED HYPERLIPIDEMIA TYPE: ICD-10-CM

## 2023-03-24 DIAGNOSIS — T45.1X5A CINV (CHEMOTHERAPY-INDUCED NAUSEA AND VOMITING): ICD-10-CM

## 2023-03-24 DIAGNOSIS — D64.9 ANEMIA OF UNKNOWN ETIOLOGY: ICD-10-CM

## 2023-03-24 DIAGNOSIS — R11.0 NAUSEA: ICD-10-CM

## 2023-03-24 DIAGNOSIS — R11.0 NAUSEA: Primary | ICD-10-CM

## 2023-03-24 DIAGNOSIS — K21.9 GASTROESOPHAGEAL REFLUX DISEASE, UNSPECIFIED WHETHER ESOPHAGITIS PRESENT: ICD-10-CM

## 2023-03-24 PROCEDURE — 99499 UNLISTED E&M SERVICE: CPT | Mod: HCNC,S$GLB,, | Performed by: NURSE PRACTITIONER

## 2023-03-24 PROCEDURE — 1160F RVW MEDS BY RX/DR IN RCRD: CPT | Mod: HCNC,CPTII,S$GLB, | Performed by: NURSE PRACTITIONER

## 2023-03-24 PROCEDURE — 3077F SYST BP >= 140 MM HG: CPT | Mod: HCNC,CPTII,S$GLB, | Performed by: NURSE PRACTITIONER

## 2023-03-24 PROCEDURE — 96374 THER/PROPH/DIAG INJ IV PUSH: CPT | Mod: HCNC

## 2023-03-24 PROCEDURE — 99999 PR PBB SHADOW E&M-EST. PATIENT-LVL IV: CPT | Mod: PBBFAC,HCNC,, | Performed by: NURSE PRACTITIONER

## 2023-03-24 PROCEDURE — 3079F DIAST BP 80-89 MM HG: CPT | Mod: HCNC,CPTII,S$GLB, | Performed by: NURSE PRACTITIONER

## 2023-03-24 PROCEDURE — 3077F PR MOST RECENT SYSTOLIC BLOOD PRESSURE >= 140 MM HG: ICD-10-PCS | Mod: HCNC,CPTII,S$GLB, | Performed by: NURSE PRACTITIONER

## 2023-03-24 PROCEDURE — 1126F AMNT PAIN NOTED NONE PRSNT: CPT | Mod: HCNC,CPTII,S$GLB, | Performed by: NURSE PRACTITIONER

## 2023-03-24 PROCEDURE — 99215 OFFICE O/P EST HI 40 MIN: CPT | Mod: HCNC,S$GLB,, | Performed by: NURSE PRACTITIONER

## 2023-03-24 PROCEDURE — 1126F PR PAIN SEVERITY QUANTIFIED, NO PAIN PRESENT: ICD-10-PCS | Mod: HCNC,CPTII,S$GLB, | Performed by: NURSE PRACTITIONER

## 2023-03-24 PROCEDURE — 3008F PR BODY MASS INDEX (BMI) DOCUMENTED: ICD-10-PCS | Mod: HCNC,CPTII,S$GLB, | Performed by: NURSE PRACTITIONER

## 2023-03-24 PROCEDURE — 1111F PR DISCHARGE MEDS RECONCILED W/ CURRENT OUTPATIENT MED LIST: ICD-10-PCS | Mod: HCNC,CPTII,S$GLB, | Performed by: NURSE PRACTITIONER

## 2023-03-24 PROCEDURE — 63600175 PHARM REV CODE 636 W HCPCS: Mod: HCNC | Performed by: NURSE PRACTITIONER

## 2023-03-24 PROCEDURE — 3008F BODY MASS INDEX DOCD: CPT | Mod: HCNC,CPTII,S$GLB, | Performed by: NURSE PRACTITIONER

## 2023-03-24 PROCEDURE — 99215 PR OFFICE/OUTPT VISIT, EST, LEVL V, 40-54 MIN: ICD-10-PCS | Mod: HCNC,S$GLB,, | Performed by: NURSE PRACTITIONER

## 2023-03-24 PROCEDURE — 1159F MED LIST DOCD IN RCRD: CPT | Mod: HCNC,CPTII,S$GLB, | Performed by: NURSE PRACTITIONER

## 2023-03-24 PROCEDURE — A4216 STERILE WATER/SALINE, 10 ML: HCPCS | Mod: HCNC | Performed by: NURSE PRACTITIONER

## 2023-03-24 PROCEDURE — 3079F PR MOST RECENT DIASTOLIC BLOOD PRESSURE 80-89 MM HG: ICD-10-PCS | Mod: HCNC,CPTII,S$GLB, | Performed by: NURSE PRACTITIONER

## 2023-03-24 PROCEDURE — 25000003 PHARM REV CODE 250: Mod: HCNC | Performed by: NURSE PRACTITIONER

## 2023-03-24 PROCEDURE — 1111F DSCHRG MED/CURRENT MED MERGE: CPT | Mod: HCNC,CPTII,S$GLB, | Performed by: NURSE PRACTITIONER

## 2023-03-24 PROCEDURE — 4010F ACE/ARB THERAPY RXD/TAKEN: CPT | Mod: HCNC,CPTII,S$GLB, | Performed by: NURSE PRACTITIONER

## 2023-03-24 PROCEDURE — 1160F PR REVIEW ALL MEDS BY PRESCRIBER/CLIN PHARMACIST DOCUMENTED: ICD-10-PCS | Mod: HCNC,CPTII,S$GLB, | Performed by: NURSE PRACTITIONER

## 2023-03-24 PROCEDURE — 99499 RISK ADDL DX/OHS AUDIT: ICD-10-PCS | Mod: HCNC,S$GLB,, | Performed by: NURSE PRACTITIONER

## 2023-03-24 PROCEDURE — 1159F PR MEDICATION LIST DOCUMENTED IN MEDICAL RECORD: ICD-10-PCS | Mod: HCNC,CPTII,S$GLB, | Performed by: NURSE PRACTITIONER

## 2023-03-24 PROCEDURE — 99999 PR PBB SHADOW E&M-EST. PATIENT-LVL IV: ICD-10-PCS | Mod: PBBFAC,HCNC,, | Performed by: NURSE PRACTITIONER

## 2023-03-24 PROCEDURE — 4010F PR ACE/ARB THEARPY RXD/TAKEN: ICD-10-PCS | Mod: HCNC,CPTII,S$GLB, | Performed by: NURSE PRACTITIONER

## 2023-03-24 RX ORDER — ONDANSETRON 2 MG/ML
8 INJECTION INTRAMUSCULAR; INTRAVENOUS ONCE
Status: CANCELLED
Start: 2023-03-24

## 2023-03-24 RX ORDER — ONDANSETRON 2 MG/ML
8 INJECTION INTRAMUSCULAR; INTRAVENOUS ONCE
Status: DISCONTINUED | OUTPATIENT
Start: 2023-03-24 | End: 2023-03-31 | Stop reason: HOSPADM

## 2023-03-24 RX ORDER — LIDOCAINE AND PRILOCAINE 25; 25 MG/G; MG/G
CREAM TOPICAL
Qty: 30 G | Refills: 0 | Status: SHIPPED | OUTPATIENT
Start: 2023-03-24 | End: 2023-09-05

## 2023-03-24 RX ORDER — ACYCLOVIR 400 MG/1
400 TABLET ORAL 2 TIMES DAILY
Qty: 60 TABLET | Refills: 5 | Status: SHIPPED | OUTPATIENT
Start: 2023-03-24 | End: 2023-09-05

## 2023-03-24 RX ORDER — MORPHINE SULFATE ORAL SOLUTION 10 MG/5ML
SOLUTION ORAL
COMMUNITY
Start: 2023-03-13 | End: 2023-09-05

## 2023-03-24 RX ORDER — ADHESIVE BANDAGE
30 BANDAGE TOPICAL DAILY PRN
Qty: 118 ML | Refills: 2 | Status: SHIPPED | OUTPATIENT
Start: 2023-03-24

## 2023-03-24 RX ORDER — ONDANSETRON 2 MG/ML
8 INJECTION INTRAMUSCULAR; INTRAVENOUS ONCE
Status: COMPLETED | OUTPATIENT
Start: 2023-03-24 | End: 2023-03-24

## 2023-03-24 RX ORDER — SODIUM CHLORIDE 0.9 % (FLUSH) 0.9 %
10 SYRINGE (ML) INJECTION
Status: DISCONTINUED | OUTPATIENT
Start: 2023-03-24 | End: 2023-03-24 | Stop reason: HOSPADM

## 2023-03-24 RX ORDER — HEPARIN 100 UNIT/ML
500 SYRINGE INTRAVENOUS
Status: COMPLETED | OUTPATIENT
Start: 2023-03-24 | End: 2023-03-24

## 2023-03-24 RX ADMIN — HEPARIN 500 UNITS: 100 SYRINGE at 02:03

## 2023-03-24 RX ADMIN — Medication 10 ML: at 02:03

## 2023-03-24 RX ADMIN — ONDANSETRON 8 MG: 2 INJECTION INTRAMUSCULAR; INTRAVENOUS at 01:03

## 2023-03-24 NOTE — PROGRESS NOTES
Jeremy Martinez was consented for chemotherapy to treat  DLBCL.  Jeremy Martinez was consented to receive RCHOP.   The consent was discussed and reviewed with patient and his spouse.     2. Patient was education on what to expect when receiving chemotherapy including: checking in, receiving an ID band, 1 guest allowed in the infusion suite during infusion, can alternate people as well, pole going with you once you are hooked up, warm blankets are available, you may bring lunch and snacks, minimal snacks are available, take medications as regularly unless told otherwise, warm blankets, will be available. Education about what to expect during their chemo cycle and how often their regimen is given.     3. An extensive discussion was had which included a thorough discussion of the risk and benefits of treatment and alternatives.  Risks, including but not limited to, possible hair loss, bone marrow damage (anemia, thrombocytopenia, immune suppression, neutropenia), damage to body organs (brain, heart, liver, kidney, lungs, nervous system, skin, and others), allergic reactions, sterility, nausea/vomiting, constipation/diarrhea, sores in the mouth, secondary cancers, local damage at possible injection sites, and rarely death were all discussed. Specific side effects pertaining to their chemotherapy/immunotherapy medications were discussed as well.The patient agrees with the plan, and all questions and their support system's questions have been answered to their satisfaction. Contraindications and potential side effects discussed as listed in micromedx.     4. Patient was given binder which includes: contact information for the [facility], an immunotherapy side effect guide (if applicable to patient), resources including, but not limited to: women's wellness, acupuncture, physical therapy, , urgent care within oncology and financial assistance.     5. Patient was educated on when to call (and given the  numbers to call and knows to message via [facility] if possible) or notify the provider including, but not limited to:   Persistent Nausea and/or Vomiting  Dehydration  Persistent Diarrhea  Fever of 100.4 > 1 hour in duration or any isolated fever > 101   Rash (while on active chemotherapy or immunotherapy)   Severe pain or new onset pain not controlled by current medication regimen  Or any other symptom you feel is related to your current hematology or oncology treatment    6. Patient was provided with additional resources that [facility] offers including, but not limited to: financial counseling, , psychologist, palliative care, support groups, transportation, dietician, rehab services, women's wellness and urgent care visits within the oncology department.     7. Patient was offered and signed up for chemo care companion. Educated on daily vital signs and daily questionnaire.     8. Patient has an established PCP, patient currently without a PCP, but stable, will refer to internal medicine, or patient without a PCP and referred to oncology PCP clinic.      9. Patient was offered a virtual visit or a phone call 1 week post their Cycle 1 Day 1 chemotherapy and agreed or declined.     10. Advance Care Planning     Date: 03/24/2023      Patient did not wish to name a surrogate decision maker or provide an Advance Care Plan.    Patient will Proceed with cycle 1 day 1 of RCHOP on o3/26/23. He will receive C1 at inpatient settings.  Patient will be seen ~1 week for a post chemo visit with BRIAN.         HPI: , 68, M, is here for hematology consultation for newly diagnosed diffuse large B cell lymphoma. He has GERD, HLD, HTn. He presented to outside hospital in February 2023 with epigastric pain.  He was found to have an esophageal mass and was transferred to [facility] for further care. He underwent endoscopy with biopsy of GI mass . He remained stable on liquid diet.     He has lost ~30 lbs in the past 6  months. Denies fevers, night sweats, upper/lower GI bleeding. He have chronic constipation.  Today reported GI upset following yesterday's PET CT.  Worsening nausea at clinic, sent patient to infusion for urgent zofran administration.   Visit completed after Zofran administration.  Past Medical History:   Diagnosis Date    Erectile dysfunction     GERD (gastroesophageal reflux disease)     Hyperlipidemia     Hypertension                Past Surgical History:   Procedure Laterality Date    COLONOSCOPY N/A 2017    Procedure: COLONOSCOPY;  Surgeon: AMBER Restrepo MD;  Location: [facility] ENDO (4TH FLR);  Service: Endoscopy;  Laterality: N/A;    ENDOSCOPIC ULTRASOUND OF UPPER GASTROINTESTINAL TRACT N/A 2023    Procedure: ULTRASOUND, UPPER GI TRACT, ENDOSCOPIC;  Surgeon: Ady Cornell MD;  Location: [facility] ENDO (2ND FLR);  Service: Endoscopy;  Laterality: N/A;    ESOPHAGOGASTRODUODENOSCOPY N/A 2023    Procedure: EGD (ESOPHAGOGASTRODUODENOSCOPY);  Surgeon: Ady Cornell MD;  Location: [facility] (2ND FLR);  Service: Endoscopy;  Laterality: N/A;    INSERTION OF TUNNELED CENTRAL VENOUS CATHETER (CVC) WITH SUBCUTANEOUS PORT Right 3/17/2023    Procedure: INSERTION, PORT-A-CATH;  Surgeon: Will Corrales II, MD;  Location: [facility] CATH LAB;  Service: Interventional Radiology;  Laterality: Right;    TOTAL KNEE ARTHROPLASTY  2010    right         Family History   Problem Relation Age of Onset    Diabetes Mother     Stroke Father     Kidney disease Son         transplant    Cancer Neg Hx     Colon cancer Neg Hx     Colon polyps Neg Hx     Esophageal cancer Neg Hx     Stomach cancer Neg Hx     Rectal cancer Neg Hx     Ulcerative colitis Neg Hx     Crohn's disease Neg Hx        Social History     Socioeconomic History    Marital status:    Tobacco Use    Smoking status: Former     Years: 10.00     Types: Cigarettes     Quit date: 2000     Years since quittin.6    Smokeless tobacco: Never    Substance and Sexual Activity    Alcohol use: Yes     Comment: beers 5 d per week, 3 at a time    Drug use: No   Social History Narrative     (in gen healthy), 5 kids. 4 grandkids. 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 No formal exercise but active. 1 son living with him.       Review of patient's allergies indicates:   Allergen Reactions    Amoxicillin Hives and Rash       Current Outpatient Medications   Medication Sig    acetaminophen (TYLENOL) 325 MG tablet Take 325 mg by mouth every 6 (six) hours as needed for Pain.    allopurinoL (ZYLOPRIM) 100 MG tablet Take 3 tablets (300 mg total) by mouth once daily.    azelastine (ASTELIN) 137 mcg (0.1 %) nasal spray 1 spray (137 mcg total) by Nasal route 2 (two) times daily.    hydrocodone-acetaminophen (HYCET) solution 7.5-325 mg/15mL Take 15 mLs by mouth every 6 (six) hours as needed for Pain.    HYDROcodone-acetaminophen (LORTAB ELIXIR)  mg/15 mL Soln Take 15 mLs by mouth every 4 (four) hours as needed (pain).    KENALOG 40 mg/mL injection     lisinopriL (PRINIVIL,ZESTRIL) 40 MG tablet Take 1 tablet (40 mg total) by mouth once daily.    morphine 10 mg/5 mL solution SMARTSI.5 Milliliter(s) By Mouth Every 6 Hours PRN    omeprazole (PRILOSEC) 40 MG capsule Take 1 capsule (40 mg total) by mouth 2 (two) times daily before meals.    ondansetron (ZOFRAN) 8 MG tablet Take 1 tablet (8 mg total) by mouth every 12 (twelve) hours as needed for Nausea.    predniSONE (DELTASONE) 50 MG Tab Take 2 tablets (100 mg total) by mouth As instructed. Take 100 mg daily in the morning for 5 days once every 21 days    promethazine (PHENERGAN) 12.5 MG Tab Take 2 tablets (25 mg total) by mouth every 6 (six) hours as needed (Severe Nausea).    sildenafiL (VIAGRA) 100 MG tablet Take 1 tablet (100 mg total) by mouth as needed for Erectile Dysfunction.    acyclovir (ZOVIRAX) 400 MG tablet Take 1 tablet (400 mg total) by mouth 2 (two) times daily.    LIDOcaine-prilocaine (EMLA) cream Apply  topically as needed.    magnesium hydroxide 400 mg/5 ml (MILK OF MAGNESIA) 400 mg/5 mL Susp Take 30 mLs (2,400 mg total) by mouth daily as needed.     Current Facility-Administered Medications   Medication    ondansetron injection 8 mg          Review of Systems   Constitutional:  Positive for malaise/fatigue and weight loss. Negative for chills, diaphoresis and fever.   HENT:  Negative for congestion, ear pain, hearing loss, nosebleeds, sinus pain and tinnitus.    Eyes:  Negative for blurred vision, double vision, photophobia, pain and discharge.   Respiratory:  Negative for cough, hemoptysis and sputum production.    Cardiovascular:  Negative for palpitations, orthopnea, claudication and leg swelling.   Gastrointestinal:  Positive for abdominal pain, constipation, heartburn and nausea. Negative for blood in stool, diarrhea, melena and vomiting.   Genitourinary:  Negative for dysuria, frequency, hematuria and urgency.   Musculoskeletal:  Negative for back pain, myalgias and neck pain.   Neurological:  Negative for tingling, tremors, focal weakness, weakness and headaches.   Endo/Heme/Allergies:  Negative for environmental allergies.   Psychiatric/Behavioral:  Negative for depression, hallucinations and substance abuse. The patient is not nervous/anxious and does not have insomnia.           Vitals:    03/24/23 1317   BP: (!) 140/82   Pulse: 86   Resp: 16   Temp: 97.8 °F (36.6 °C)       PS: ECOG 2    Physical Exam  Constitutional:       Appearance: Normal appearance.   HENT:      Head: Normocephalic and atraumatic.      Mouth/Throat:      Pharynx: No posterior oropharyngeal erythema.   Eyes:      General: No scleral icterus.  Cardiovascular:      Rate and Rhythm: Normal rate and regular rhythm.      Heart sounds: No murmur heard.  Pulmonary:      Effort: Pulmonary effort is normal. No respiratory distress.      Breath sounds: Normal breath sounds. No rhonchi.   Abdominal:      General: There is no distension.       Palpations: There is no mass.      Tenderness: There is no abdominal tenderness.   Musculoskeletal:         General: No swelling.   Lymphadenopathy:      Cervical: No cervical adenopathy.   Skin:     Coloration: Skin is not jaundiced.   Neurological:      General: No focal deficit present.      Mental Status: He is alert and oriented to person, place, and time.      Cranial Nerves: No cranial nerve deficit.        2/18/23 CT abdomen pelvis with contrast    COMPARISON:  None     FINDINGS:  Abdomen:     - Lung bases: There is focal emphysematous change in the medial left lung base.  Lung bases contain mild areas of peripheral chronic atelectasis.  A focus of suspected atelectasis simulates a nodular opacity at the right dome of the diaphragm.     There is distal esophageal distension with fluid secondary to and infiltrative type mass involving the visualized cardiac portion of the stomach extending to the GE junction concerning for malignancy.  There is a mantle of lobular adenopathy surrounding the proximal abdominal aorta appearing to involve the retrocrural lymph nodes and adjacent periaortic and pericaval lymph nodes to the level of the left renal vein with the renal a vein somewhat displaced.  No visible renal vein invasion/thrombosis.  Smaller shotty type lymph nodes are seen caudal to the renal veins in the retroperitoneum.     - Liver: The liver appears homogeneous and not significantly enlarged.     - Gallbladder: There is no CT evidence of cholecystitis or cholelithiasis.     - Bile Ducts: No evidence of intra or extra hepatic biliary ductal dilation.     - Spleen: Negative.     - Kidneys: The kidneys enhance symmetrically and homogeneously.     - Adrenals: Unremarkable.     - Pancreas: Pancreas is displaced anteriorly by the retroperitoneal adenopathy with an ill-defined mass around 3 cm in diameter in the body appearing contiguous with the pancreatic parenchyma and similar in density to the retroperitoneal  adenopathy consistent with involvement/invasion.  Additionally there is no fat plane margin between the posterior pancreatic body and the lobular retroperitoneal mantle of adenopathy.  Heterogeneous enhancement of the body of the pancreas in this region and poor visualization of the splenic artery and vein within the body suggests vessel encasement/involvement.  Partial thrombosis of portions of these vessels is suspected.     -Vascular: No abdominal aortic aneurysm or significant atherosclerosis.     - Abdominal wall:  There is a small right inguinal hernia.     Small bowel and colon: There is no small bowel distension.  There is no mesenteric convincing adenopathy.  Shotty lymph nodes are noted.  No extraluminal free air or free fluid is seen in the abdomen or pelvis.  Appendix is not isolated as a separate structure however there is no evidence of appendicitis.  Few scattered diverticuli are seen distally in the colon.  No diverticulitis.     Pelvis:     There is no pelvic mass, adenopathy, or free fluid.  Bladder is unremarkable noting mild thickening of the mucosa can be explained by under distension.  The prostate is borderline mildly prominent.     Bones:  No acute osseous abnormality and no suspicious lytic or blastic lesion.     Impression:     Bulky infiltrative type mass compatible with malignancy involving the gastric cardiac portion extending to the GE junction with esophageal high-grade obstruction .  Further evaluation can be made with CT chest.     Bulky adenopathy in the retroperitoneum in the periaortic and pericaval region to the level of the left renal vein also appearing to involve the dorsal pancreatic body and also appearing to involve and obstruct the splenic artery and vein as described.     Incidental additional nonacute findings are discussed in the body of the report.       2/20/23 upper GI EUS     --One extrinsic moderate stenosis was found at the gastroesophageal junction. The stenosis  was traversed.      --  A large, fungating and infiltrative mass with oozing bleeding was found in the cardia, in the gastric fundus and in the gastric body.        --Biopsies were taken with a cold forceps for histology.        --The examined duodenum was normal.          ENDOSONOGRAPHIC FINDING:          -- The esophagus and adjacent structures were visualized endosonographically.      --  A hypoechoic mass was identified endosonographically in the cardia of the stomach and in the fundus of the stomach (seen from the GE  junction, as EUS scope could not traverse the area of extrinsic        stenosis). The mass measured 52 mm by 62 mm in maximal cross-sectional diameter.     Impression:            - Extrinsic narrowing of the esophagus.                          - Gastric tumor in the cardia, in the gastric fundus and in the gastric body. Biopsied.                          - Normal examined duodenum.         1. GASTRIC MASS BIOPSY:            -  Diffuse large B-cell lymphoma, non-germinal center subtype          -  High proliferation index (99% by Ki-67 immunostain)          -  Negative for CD30 co-expression by immunostain          -  TREY JANA positive for EBV-encoded small mRNAs in  few scattered   bystander lymphocytes          -   PENDING  (send-out): MYC tech only immunostain and Double/Triple Hit Lymphoma FISH panel with results to be reported in a separate supplemental and final diagnosis may be further classified             at that time   COMMENT: No flow cytometric analysis was performed on this specimen    Low and high power examination reveal minute irregular fragments of gastric tissue with an atypical diffuse submucosal infiltrate with focal crush artifacts and composed predominantly of large lymphoma cells with occasional   prominent central nucleoli and focal areas with increased scattered mature eosinophils and rarer scattered neutrophils, histiocytes, small mature lymphocytes, and plasma cells.    AE1/AE3, Synaptophysin, and Chromogranin immunostains were performed with adequate controls on block 1A by the referring surgical pathologist who   initially reviewed this biopsy and are negative for carcinoma or infiltrating neuroendocrine cells.   Immunohistochemical study with stains for CD3, CD5, BCL-2, CD20, CD23, Cyclin D1, CD10, BCL-6, MUM-1, CD30, and Ki-67 and EBV-encoded small mRNAs in-situ hybridization (TREY JANA) were performed on block 1A with appropriate controls   for increased sensitivity and cellular localization within the cells of interest.  Unstained slides of block 1A were sent out to [facility] for MYC tech only immunostain with results to be interpreted at [facility] and Double/Triple Hit Lymphoma FISH panel.   The large lymphoma cells are positive for CD20 and MUM-1 (nuclear) and negative for AE1/AE3, Synaptophysin, Chromogranin, CD3, CD5, CD23, Cyclin D1   (nuclear), CD10 (dim <30%), BCL-6 (strong nuclear <30%), and CD30. Ki-67 (nuclear) highlights a high proliferation index (99%). TREY JANA stains few scattered small and medium-sized bystander lymphocytes.   AE1/A3 stains mucosal and glandular epithelial cells. Synaptophysin and/or Chromogranin stain a rare subset of normal neuroendocrine cells associated   with the glandular epithelial cells.   CD3, CD5, and BCL-2 co-stain numerous scattered reactive T-cells within the focal non-involved areas of the biopsy. CD5 also appears to stain glandular   epithelial cells and a rare subset of the mucosal epithelial cells. Cyclin D1 (nuclear) stains epithelial and endothelial cells. CD10 stains stromal cells and few scattered neutrophils. Rare scattered medium-sized CD30+ cells (favor   reactive immunoblasts) are identified  Lab Results   Component Value Date    WBC 5.57 03/24/2023    HGB 9.6 (L) 03/24/2023    HCT 30.5 (L) 03/24/2023    MCV 74 (L) 03/24/2023     03/24/2023       CMP  Sodium   Date  Value Ref Range Status   03/24/2023 136 136 - 145 mmol/L Final     Potassium   Date Value Ref Range Status   03/24/2023 3.7 3.5 - 5.1 mmol/L Final     Chloride   Date Value Ref Range Status   03/24/2023 98 95 - 110 mmol/L Final     CO2   Date Value Ref Range Status   03/24/2023 25 23 - 29 mmol/L Final     Glucose   Date Value Ref Range Status   03/24/2023 65 (L) 70 - 110 mg/dL Final     BUN   Date Value Ref Range Status   03/24/2023 7 (L) 8 - 23 mg/dL Final     Creatinine   Date Value Ref Range Status   03/24/2023 0.8 0.5 - 1.4 mg/dL Final     Calcium   Date Value Ref Range Status   03/24/2023 9.6 8.7 - 10.5 mg/dL Final     Total Protein   Date Value Ref Range Status   03/24/2023 6.2 6.0 - 8.4 g/dL Final     Albumin   Date Value Ref Range Status   03/24/2023 2.7 (L) 3.5 - 5.2 g/dL Final     Total Bilirubin   Date Value Ref Range Status   03/24/2023 0.6 0.1 - 1.0 mg/dL Final     Comment:     For infants and newborns, interpretation of results should be based  on gestational age, weight and in agreement with clinical  observations.    Premature Infant recommended reference ranges:  Up to 24 hours.............<8.0 mg/dL  Up to 48 hours............<12.0 mg/dL  3-5 days..................<15.0 mg/dL  6-29 days.................<15.0 mg/dL       Alkaline Phosphatase   Date Value Ref Range Status   03/24/2023 54 (L) 55 - 135 U/L Final     AST   Date Value Ref Range Status   03/24/2023 18 10 - 40 U/L Final     ALT   Date Value Ref Range Status   03/24/2023 8 (L) 10 - 44 U/L Final     Anion Gap   Date Value Ref Range Status   03/24/2023 13 8 - 16 mmol/L Final     eGFR   Date Value Ref Range Status   03/24/2023 >60.0 >60 mL/min/1.73 m^2 Final     1. Diffuse large B-cell lymphoma of lymph nodes of multiple regions  Rapid BMT CBC with Diff    Comprehensive Metabolic Panel    Uric Acid    acyclovir (ZOVIRAX) 400 MG tablet    magnesium hydroxide 400 mg/5 ml (MILK OF MAGNESIA) 400 mg/5 mL Susp    LIDOcaine-prilocaine (EMLA) cream       2. CINV (chemotherapy-induced nausea and vomiting)  ondansetron injection 8 mg      3. Nausea  DISCONTINUED: ondansetron injection 8 mg      4. Gastroesophageal reflux disease, unspecified whether esophagitis present        5. Chronic fatigue        6. Primary hypertension        7. Hyperlipidemia, unspecified hyperlipidemia type        8. Anemia of unknown etiology                Assessment:    1. DLBCL, non-GC type of multiple sites  2. DLBCL of stomach  3. Fatigue  4. Microcytic anemia  5. Htn, primary  6. HLD  7.GERD  8. Cancer associated pain  9. Constipation  10. Weight loss  11. Malnutrition      Plan:    1,2: I discussed the diagnosis, prognosis of diffuse large B cell lymphoma. I explained that the treatment is with multi-agent chemotherapy. Staging with PETCT, BM biopsy.  FISH pending. Ki 67 high. HBV, HIV, G6PD unremarkable.  He will start allopurinol 300mg daily to prevent tumor lysis syndrome.  He scheduled for ECHO on 03/28/23, PET CT (03/23/23)- hypermetabolic lymphadenopathy above and below the diaphragm, consistent with extranodal disease.   port placed . He will likely start chemotherapy with RCHOp on 03/29/23. C1 will initiate at inpatient setting.   Chemo education provided by pharmacist. Consent obtained and scanned to media. Enrolled in chemo care companion.   Ordered acyclovir, dexa to pharmacy.  4. TIBC low, ferritin normal,. Saturated iron 12% on 2/19/23. He will have hemoglobin electrophoresis checked.   Treatment is with curative intent.       5,6: He follows with     7. He is on omeprazole BID    8. He will take morphine 15mg liquid by mouth as needed, q 6hour. He is aware of the risks associated with morphine, including drowsiness, risk of falls, and constipation. He uis aware he cannot drive if he is using morphine.      9. He has poor oral intake. He has bowel sounds on examination. Using PRN miralax, colace. Ordered PRN milk of magnesia.    10, 11. Secondary to poor oral  intake. He is on liquid diet. Encouraged use of ENSURE three times daily.             BMT Chart Routing  Urgent    Follow up with physician . Inpatient admission for C1 RCHOP chemo on 03/29/23   Follow up with BRIAN    Provider visit type    Infusion scheduling note    Injection scheduling note Need covid swab prior admission   Labs    Imaging    Pharmacy appointment    Other referrals         Advance Care Planning     Date: 03/24/2023       We previously had discussions regarding his underlying diagnosis, natural history, prognosis, and treatment options.  He was interested in pursuing any and all treatment options available to him, including chemo treatment.  He remains interested in pursuing all treatment needed.  Will continue to proceed with C1 treatment. Will follow up closely during treatment.   Total time of this visit was 30 minutes, including time spent face to face with patient and also in preparing for today's visit for MDM and documentation. (Medical Decision Making, including consideration of possible diagnoses, management options, complex medical record review, review of diagnostic tests and information, consideration and discussion of significant complications based on comorbidities, and discussion with providers involved with the care of the patient). Greater than 50% was spent face to face with the patient counseling and coordinating care.      Nicole Dia NP  Hematology/Oncology/BMT

## 2023-03-24 NOTE — PLAN OF CARE
Patient tolerated Zoftran IVP with no complications. VSS. Pt instructed to call MD with any problems. Pt discharged to next appointment independently.

## 2023-03-27 ENCOUNTER — TELEPHONE (OUTPATIENT)
Dept: HEMATOLOGY/ONCOLOGY | Facility: CLINIC | Age: 68
End: 2023-03-27
Payer: MEDICARE

## 2023-03-28 ENCOUNTER — HOSPITAL ENCOUNTER (OUTPATIENT)
Dept: CARDIOLOGY | Facility: HOSPITAL | Age: 68
Discharge: HOME | DRG: 847 | End: 2023-03-28
Attending: INTERNAL MEDICINE
Payer: MEDICARE

## 2023-03-28 VITALS
BODY MASS INDEX: 20.89 KG/M2 | WEIGHT: 130 LBS | HEIGHT: 66 IN | SYSTOLIC BLOOD PRESSURE: 140 MMHG | DIASTOLIC BLOOD PRESSURE: 82 MMHG

## 2023-03-28 DIAGNOSIS — C83.38 DIFFUSE LARGE B-CELL LYMPHOMA OF LYMPH NODES OF MULTIPLE REGIONS: ICD-10-CM

## 2023-03-28 LAB
ASCENDING AORTA: 2.24 CM
AV INDEX (PROSTH): 0.45
AV MEAN GRADIENT: 8 MMHG
AV PEAK GRADIENT: 15 MMHG
AV VALVE AREA: 1.05 CM2
AV VELOCITY RATIO: 0.48
BSA FOR ECHO PROCEDURE: 1.66 M2
CV ECHO LV RWT: 0.36 CM
DOP CALC AO PEAK VEL: 1.91 M/S
DOP CALC AO VTI: 32.3 CM
DOP CALC LVOT AREA: 2.3 CM2
DOP CALC LVOT DIAMETER: 1.73 CM
DOP CALC LVOT PEAK VEL: 0.92 M/S
DOP CALC LVOT STROKE VOLUME: 33.83 CM3
DOP CALC MV VTI: 20 CM
DOP CALC RVOT PEAK VEL: 0.78 M/S
DOP CALC RVOT VTI: 13.2 CM
DOP CALCLVOT PEAK VEL VTI: 14.4 CM
E WAVE DECELERATION TIME: 227.32 MSEC
E/A RATIO: 0.7
E/E' RATIO: 7.6 M/S
ECHO LV POSTERIOR WALL: 0.8 CM (ref 0.6–1.1)
EJECTION FRACTION: 60 %
FRACTIONAL SHORTENING: 29 % (ref 28–44)
INTERVENTRICULAR SEPTUM: 0.84 CM (ref 0.6–1.1)
IVC DIAMETER: 0.75 CM
IVRT: 94.2 MSEC
LA MAJOR: 4.34 CM
LA MINOR: 3.84 CM
LA WIDTH: 3.8 CM
LEFT ATRIUM SIZE: 3.02 CM
LEFT ATRIUM VOLUME INDEX MOD: 24.6 ML/M2
LEFT ATRIUM VOLUME INDEX: 23.8 ML/M2
LEFT ATRIUM VOLUME MOD: 41.15 CM3
LEFT ATRIUM VOLUME: 39.75 CM3
LEFT INTERNAL DIMENSION IN SYSTOLE: 3.13 CM (ref 2.1–4)
LEFT VENTRICLE DIASTOLIC VOLUME INDEX: 52.74 ML/M2
LEFT VENTRICLE DIASTOLIC VOLUME: 88.07 ML
LEFT VENTRICLE MASS INDEX: 68 G/M2
LEFT VENTRICLE SYSTOLIC VOLUME INDEX: 23.3 ML/M2
LEFT VENTRICLE SYSTOLIC VOLUME: 38.94 ML
LEFT VENTRICULAR INTERNAL DIMENSION IN DIASTOLE: 4.41 CM (ref 3.5–6)
LEFT VENTRICULAR MASS: 113.48 G
LV LATERAL E/E' RATIO: 7.13 M/S
LV SEPTAL E/E' RATIO: 8.14 M/S
LVOT MG: 2 MMHG
LVOT MV: 0.68 CM/S
MV MEAN GRADIENT: 2 MMHG
MV PEAK A VEL: 0.82 M/S
MV PEAK E VEL: 0.57 M/S
MV PEAK GRADIENT: 7 MMHG
MV STENOSIS PRESSURE HALF TIME: 65.92 MS
MV VALVE AREA BY CONTINUITY EQUATION: 1.69 CM2
MV VALVE AREA P 1/2 METHOD: 3.34 CM2
PISA MRMAX VEL: 6.3 M/S
PISA TR MAX VEL: 3.04 M/S
PULM VEIN S/D RATIO: 1.61
PV MEAN GRADIENT: 1.07 MMHG
PV MV: 0.84 M/S
PV PEAK D VEL: 0.51 M/S
PV PEAK S VEL: 0.82 M/S
PV PEAK VELOCITY: 1.14 CM/S
RA MAJOR: 4.23 CM
RA PRESSURE: 3 MMHG
RA WIDTH: 3.1 CM
RIGHT VENTRICULAR END-DIASTOLIC DIMENSION: 2.49 CM
RV TISSUE DOPPLER FREE WALL SYSTOLIC VELOCITY 1 (APICAL 4 CHAMBER VIEW): 0.02 CM/S
SINUS: 3.2 CM
STJ: 1.53 CM
TDI LATERAL: 0.08 M/S
TDI SEPTAL: 0.07 M/S
TDI: 0.08 M/S
TR MAX PG: 37 MMHG
TRICUSPID ANNULAR PLANE SYSTOLIC EXCURSION: 2 CM
TV REST PULMONARY ARTERY PRESSURE: 40 MMHG

## 2023-03-28 PROCEDURE — 93306 TTE W/DOPPLER COMPLETE: CPT | Mod: 26,HCNC,, | Performed by: INTERNAL MEDICINE

## 2023-03-28 PROCEDURE — 93306 ECHO (CUPID ONLY): ICD-10-PCS | Mod: 26,HCNC,, | Performed by: INTERNAL MEDICINE

## 2023-03-28 PROCEDURE — 93306 TTE W/DOPPLER COMPLETE: CPT | Mod: HCNC,PN

## 2023-03-29 ENCOUNTER — CLINICAL SUPPORT (OUTPATIENT)
Dept: HEMATOLOGY/ONCOLOGY | Facility: CLINIC | Age: 68
DRG: 847 | End: 2023-03-29
Payer: MEDICARE

## 2023-03-29 ENCOUNTER — HOSPITAL ENCOUNTER (INPATIENT)
Facility: HOSPITAL | Age: 68
LOS: 2 days | Discharge: HOME | DRG: 847 | End: 2023-03-31
Attending: INTERNAL MEDICINE | Admitting: INTERNAL MEDICINE
Payer: MEDICARE

## 2023-03-29 DIAGNOSIS — C83.38 DIFFUSE LARGE B-CELL LYMPHOMA OF LYMPH NODES OF MULTIPLE REGIONS: ICD-10-CM

## 2023-03-29 DIAGNOSIS — C83.30 DLBCL (DIFFUSE LARGE B CELL LYMPHOMA): Primary | ICD-10-CM

## 2023-03-29 DIAGNOSIS — Z51.11 ADMISSION FOR CHEMOTHERAPY: ICD-10-CM

## 2023-03-29 DIAGNOSIS — C83.38 DIFFUSE LARGE B-CELL LYMPHOMA OF LYMPH NODES OF MULTIPLE REGIONS: Primary | ICD-10-CM

## 2023-03-29 PROBLEM — K59.09 OTHER CONSTIPATION: Status: ACTIVE | Noted: 2023-03-29

## 2023-03-29 PROBLEM — R14.1 GAS PAIN: Status: ACTIVE | Noted: 2023-03-29

## 2023-03-29 LAB — SARS-COV-2 RDRP RESP QL NAA+PROBE: NEGATIVE

## 2023-03-29 PROCEDURE — 25000003 PHARM REV CODE 250: Mod: HCNC | Performed by: INTERNAL MEDICINE

## 2023-03-29 PROCEDURE — U0002 COVID-19 LAB TEST NON-CDC: HCPCS | Mod: HCNC | Performed by: INTERNAL MEDICINE

## 2023-03-29 PROCEDURE — 99223 1ST HOSP IP/OBS HIGH 75: CPT | Mod: AI,HCNC,, | Performed by: INTERNAL MEDICINE

## 2023-03-29 PROCEDURE — 99223 PR INITIAL HOSPITAL CARE,LEVL III: ICD-10-PCS | Mod: AI,HCNC,, | Performed by: INTERNAL MEDICINE

## 2023-03-29 PROCEDURE — 63600175 PHARM REV CODE 636 W HCPCS: Mod: HCNC | Performed by: INTERNAL MEDICINE

## 2023-03-29 PROCEDURE — 63600175 PHARM REV CODE 636 W HCPCS: Mod: HCNC | Performed by: NURSE PRACTITIONER

## 2023-03-29 PROCEDURE — 25000003 PHARM REV CODE 250: Mod: HCNC | Performed by: NURSE PRACTITIONER

## 2023-03-29 PROCEDURE — 20600001 HC STEP DOWN PRIVATE ROOM: Mod: HCNC

## 2023-03-29 RX ORDER — LANOLIN ALCOHOL/MO/W.PET/CERES
800 CREAM (GRAM) TOPICAL EVERY 4 HOURS PRN
Status: DISCONTINUED | OUTPATIENT
Start: 2023-03-29 | End: 2023-03-31 | Stop reason: HOSPADM

## 2023-03-29 RX ORDER — PANTOPRAZOLE SODIUM 40 MG/1
40 TABLET, DELAYED RELEASE ORAL DAILY
Status: DISCONTINUED | OUTPATIENT
Start: 2023-03-30 | End: 2023-03-31 | Stop reason: HOSPADM

## 2023-03-29 RX ORDER — POTASSIUM CHLORIDE 20 MEQ/1
20 TABLET, EXTENDED RELEASE ORAL
Status: DISCONTINUED | OUTPATIENT
Start: 2023-03-29 | End: 2023-03-31 | Stop reason: HOSPADM

## 2023-03-29 RX ORDER — SODIUM CHLORIDE 0.9 % (FLUSH) 0.9 %
10 SYRINGE (ML) INJECTION EVERY 12 HOURS PRN
Status: DISCONTINUED | OUTPATIENT
Start: 2023-03-29 | End: 2023-03-30

## 2023-03-29 RX ORDER — PROMETHAZINE HYDROCHLORIDE 25 MG/1
25 TABLET ORAL EVERY 6 HOURS PRN
Status: DISCONTINUED | OUTPATIENT
Start: 2023-03-29 | End: 2023-03-31 | Stop reason: HOSPADM

## 2023-03-29 RX ORDER — NALOXONE HCL 0.4 MG/ML
0.02 VIAL (ML) INJECTION
Status: DISCONTINUED | OUTPATIENT
Start: 2023-03-29 | End: 2023-03-31 | Stop reason: HOSPADM

## 2023-03-29 RX ORDER — DEXTROSE 40 %
15 GEL (GRAM) ORAL
Status: DISCONTINUED | OUTPATIENT
Start: 2023-03-29 | End: 2023-03-31 | Stop reason: HOSPADM

## 2023-03-29 RX ORDER — PREDNISONE 50 MG/1
100 TABLET ORAL
Status: DISCONTINUED | OUTPATIENT
Start: 2023-03-29 | End: 2023-03-31 | Stop reason: HOSPADM

## 2023-03-29 RX ORDER — PROCHLORPERAZINE EDISYLATE 5 MG/ML
5 INJECTION INTRAMUSCULAR; INTRAVENOUS EVERY 6 HOURS PRN
Status: DISCONTINUED | OUTPATIENT
Start: 2023-03-29 | End: 2023-03-31 | Stop reason: HOSPADM

## 2023-03-29 RX ORDER — PROMETHAZINE HYDROCHLORIDE 25 MG/1
25 TABLET ORAL EVERY 6 HOURS PRN
Status: DISCONTINUED | OUTPATIENT
Start: 2023-03-29 | End: 2023-03-29

## 2023-03-29 RX ORDER — ADHESIVE BANDAGE
30 BANDAGE TOPICAL DAILY PRN
Status: DISCONTINUED | OUTPATIENT
Start: 2023-03-29 | End: 2023-03-31 | Stop reason: HOSPADM

## 2023-03-29 RX ORDER — LISINOPRIL 20 MG/1
40 TABLET ORAL DAILY
Status: DISCONTINUED | OUTPATIENT
Start: 2023-03-30 | End: 2023-03-31 | Stop reason: HOSPADM

## 2023-03-29 RX ORDER — GLUCAGON 1 MG
1 KIT INJECTION
Status: DISCONTINUED | OUTPATIENT
Start: 2023-03-29 | End: 2023-03-31 | Stop reason: HOSPADM

## 2023-03-29 RX ORDER — LANOLIN ALCOHOL/MO/W.PET/CERES
400 CREAM (GRAM) TOPICAL EVERY 4 HOURS PRN
Status: DISCONTINUED | OUTPATIENT
Start: 2023-03-29 | End: 2023-03-31 | Stop reason: HOSPADM

## 2023-03-29 RX ORDER — ENOXAPARIN SODIUM 100 MG/ML
40 INJECTION SUBCUTANEOUS NIGHTLY
Status: DISCONTINUED | OUTPATIENT
Start: 2023-03-29 | End: 2023-03-29

## 2023-03-29 RX ORDER — DEXTROSE 40 %
30 GEL (GRAM) ORAL
Status: DISCONTINUED | OUTPATIENT
Start: 2023-03-29 | End: 2023-03-31 | Stop reason: HOSPADM

## 2023-03-29 RX ORDER — ACYCLOVIR 200 MG/1
400 CAPSULE ORAL 2 TIMES DAILY
Status: DISCONTINUED | OUTPATIENT
Start: 2023-03-29 | End: 2023-03-31 | Stop reason: HOSPADM

## 2023-03-29 RX ORDER — HEPARIN 100 UNIT/ML
500 SYRINGE INTRAVENOUS
Status: DISCONTINUED | OUTPATIENT
Start: 2023-03-29 | End: 2023-03-31 | Stop reason: HOSPADM

## 2023-03-29 RX ORDER — ALLOPURINOL 300 MG/1
300 TABLET ORAL 2 TIMES DAILY
Status: DISCONTINUED | OUTPATIENT
Start: 2023-03-29 | End: 2023-03-30

## 2023-03-29 RX ORDER — MORPHINE SULFATE ORAL SOLUTION 10 MG/5ML
15 SOLUTION ORAL EVERY 6 HOURS PRN
Status: DISCONTINUED | OUTPATIENT
Start: 2023-03-29 | End: 2023-03-31 | Stop reason: HOSPADM

## 2023-03-29 RX ORDER — SIMETHICONE 80 MG
1 TABLET,CHEWABLE ORAL
Status: DISCONTINUED | OUTPATIENT
Start: 2023-03-29 | End: 2023-03-31 | Stop reason: HOSPADM

## 2023-03-29 RX ORDER — ONDANSETRON 2 MG/ML
12 INJECTION INTRAMUSCULAR; INTRAVENOUS
Status: COMPLETED | OUTPATIENT
Start: 2023-03-29 | End: 2023-03-29

## 2023-03-29 RX ORDER — DOXORUBICIN HYDROCHLORIDE 2 MG/ML
50 INJECTION, SOLUTION INTRAVENOUS
Status: COMPLETED | OUTPATIENT
Start: 2023-03-29 | End: 2023-03-29

## 2023-03-29 RX ORDER — ONDANSETRON 4 MG/1
12 TABLET, FILM COATED ORAL
Status: CANCELLED
Start: 2023-03-29

## 2023-03-29 RX ORDER — SODIUM CHLORIDE 0.9 % (FLUSH) 0.9 %
10 SYRINGE (ML) INJECTION
Status: DISCONTINUED | OUTPATIENT
Start: 2023-03-29 | End: 2023-03-31 | Stop reason: HOSPADM

## 2023-03-29 RX ORDER — AZELASTINE 1 MG/ML
1 SPRAY, METERED NASAL 2 TIMES DAILY
Status: DISCONTINUED | OUTPATIENT
Start: 2023-03-29 | End: 2023-03-31 | Stop reason: HOSPADM

## 2023-03-29 RX ADMIN — ONDANSETRON 12 MG: 2 INJECTION INTRAMUSCULAR; INTRAVENOUS at 09:03

## 2023-03-29 RX ADMIN — CYCLOPHOSPHAMIDE 1240 MG: 200 INJECTION, SOLUTION INTRAVENOUS at 09:03

## 2023-03-29 RX ADMIN — SIMETHICONE 80 MG: 80 TABLET, CHEWABLE ORAL at 09:03

## 2023-03-29 RX ADMIN — DOXORUBICIN HYDROCHLORIDE 84 MG: 2 INJECTION, SOLUTION INTRAVENOUS at 09:03

## 2023-03-29 RX ADMIN — ACYCLOVIR 400 MG: 200 CAPSULE ORAL at 09:03

## 2023-03-29 RX ADMIN — VINCRISTINE SULFATE 2 MG: 1 INJECTION, SOLUTION INTRAVENOUS at 09:03

## 2023-03-29 RX ADMIN — PREDNISONE 100 MG: 50 TABLET ORAL at 05:03

## 2023-03-29 RX ADMIN — ALLOPURINOL 300 MG: 300 TABLET ORAL at 09:03

## 2023-03-29 NOTE — SUBJECTIVE & OBJECTIVE
Patient information was obtained from patient, spouse/SO, and past medical records.     Oncology History:   - Patient of Dr. Sarkar    - Originally presented to an OSH with epigastric pain in February 2023. Found to have an esophageal mass.   - 2/20/23 biopsy of GI mass revealed DLBCL with Ki-67 99%, CD30 neg, CD20 positive.   - 3/23/23 PET revealed a diffuse lymphadenopathy above/below diaphragm with a mediastinal SUV 28, an infiltrative stomach mass/stomach thickening with SUV 42, and a large wanda conglomerate in his mid abdomen with SUV 36.   - never had a bone marrow biopsy; okay per Dr. Sarkar to proceed without  - Port placed 3/17/23   - 3/08/23: ECHO with EF 60% and grade I diastolic dysfunction; tricuspid regurg      Now admitted for C1 [facility]    Facility-Administered Medications Prior to Admission   Medication Dose Route Frequency Provider Last Rate Last Admin    ondansetron injection 8 mg  8 mg Intravenous Once Nicole Dia NP         Medications Prior to Admission   Medication Sig Dispense Refill Last Dose    acetaminophen (TYLENOL) 325 MG tablet Take 325 mg by mouth every 6 (six) hours as needed for Pain.       acyclovir (ZOVIRAX) 400 MG tablet Take 1 tablet (400 mg total) by mouth 2 (two) times daily. 60 tablet 5     allopurinoL (ZYLOPRIM) 100 MG tablet Take 3 tablets (300 mg total) by mouth once daily. 30 tablet 2     azelastine (ASTELIN) 137 mcg (0.1 %) nasal spray 1 spray (137 mcg total) by Nasal route 2 (two) times daily. 30 mL 3     hydrocodone-acetaminophen (HYCET) solution 7.5-325 mg/15mL Take 15 mLs by mouth every 6 (six) hours as needed for Pain. 472 mL 0     HYDROcodone-acetaminophen (LORTAB ELIXIR)  mg/15 mL Soln Take 15 mLs by mouth every 4 (four) hours as needed (pain). 200 mL 0     KENALOG 40 mg/mL injection        LIDOcaine-prilocaine (EMLA) cream Apply topically as needed. 30 g 0     lisinopriL (PRINIVIL,ZESTRIL) 40 MG tablet Take 1 tablet (40 mg total) by mouth once daily. 90  tablet 11     magnesium hydroxide 400 mg/5 ml (MILK OF MAGNESIA) 400 mg/5 mL Susp Take 30 mLs (2,400 mg total) by mouth daily as needed. 118 mL 2     morphine 10 mg/5 mL solution SMARTSI.5 Milliliter(s) By Mouth Every 6 Hours PRN       omeprazole (PRILOSEC) 40 MG capsule Take 1 capsule (40 mg total) by mouth 2 (two) times daily before meals. 180 capsule 1     ondansetron (ZOFRAN) 8 MG tablet Take 1 tablet (8 mg total) by mouth every 12 (twelve) hours as needed for Nausea. 30 tablet 2     predniSONE (DELTASONE) 50 MG Tab Take 2 tablets (100 mg total) by mouth As instructed. Take 100 mg daily in the morning for 5 days once every 21 days 60 tablet 0     promethazine (PHENERGAN) 12.5 MG Tab Take 2 tablets (25 mg total) by mouth every 6 (six) hours as needed (Severe Nausea). 30 tablet 0     sildenafiL (VIAGRA) 100 MG tablet Take 1 tablet (100 mg total) by mouth as needed for Erectile Dysfunction. 12 tablet 1        Amoxicillin     Past Medical History:   Diagnosis Date    Erectile dysfunction     GERD (gastroesophageal reflux disease)     Hyperlipidemia     Hypertension      Past Surgical History:   Procedure Laterality Date    COLONOSCOPY N/A 2017    Procedure: COLONOSCOPY;  Surgeon: AMBER Restrepo MD;  Location: [facility] (4TH FLR);  Service: Endoscopy;  Laterality: N/A;    ENDOSCOPIC ULTRASOUND OF UPPER GASTROINTESTINAL TRACT N/A 2023    Procedure: ULTRASOUND, UPPER GI TRACT, ENDOSCOPIC;  Surgeon: Ady Cornell MD;  Location: [facility] (2ND FLR);  Service: Endoscopy;  Laterality: N/A;    ESOPHAGOGASTRODUODENOSCOPY N/A 2023    Procedure: EGD (ESOPHAGOGASTRODUODENOSCOPY);  Surgeon: Ady Cornell MD;  Location: [facility] (2ND FLR);  Service: Endoscopy;  Laterality: N/A;    INSERTION OF TUNNELED CENTRAL VENOUS CATHETER (CVC) WITH SUBCUTANEOUS PORT Right 3/17/2023    Procedure: INSERTION, PORT-A-CATH;  Surgeon: Will Corrales II, MD;  Location: [facility] CATH LAB;  Service: Interventional  Radiology;  Laterality: Right;    TOTAL KNEE ARTHROPLASTY  2010    right     Family History       Problem Relation (Age of Onset)    Diabetes Mother    Kidney disease Son    Stroke Father          Tobacco Use    Smoking status: Former     Years: 10.00     Types: Cigarettes     Quit date: 2000     Years since quittin.7    Smokeless tobacco: Never   Substance and Sexual Activity    Alcohol use: Yes     Comment: beers 5 d per week, 3 at a time    Drug use: No    Sexual activity: Not on file       Review of Systems   Constitutional:  Positive for appetite change and fatigue. Negative for chills, diaphoresis and fever.   HENT:  Negative for congestion, ear pain, mouth sores, nosebleeds, postnasal drip, rhinorrhea, sinus pain, sore throat and trouble swallowing.    Eyes:  Negative for pain, redness and visual disturbance.   Respiratory:  Negative for cough, chest tightness, shortness of breath, wheezing and stridor.    Cardiovascular:  Negative for chest pain, palpitations and leg swelling.   Gastrointestinal:  Positive for constipation and nausea. Negative for abdominal distention, abdominal pain, blood in stool, diarrhea and vomiting.   Genitourinary:  Negative for hematuria.   Musculoskeletal:  Negative for arthralgias and back pain.   Skin:  Negative for rash.   Neurological:  Negative for dizziness, syncope, weakness, light-headedness, numbness and headaches.   Hematological:  Negative for adenopathy. Does not bruise/bleed easily.   Psychiatric/Behavioral:  Negative for confusion. The patient is not nervous/anxious.    Objective:     Vital Signs (Most Recent):  Temp: 97.4 °F (36.3 °C) (23 1455)  Pulse: 86 (23 1455)  Resp: 16 (23 1455)  BP: 119/74 (23 1455)  SpO2: 98 % (23 1455) Vital Signs (24h Range):  Temp:  [97.4 °F (36.3 °C)] 97.4 °F (36.3 °C)  Pulse:  [86] 86  Resp:  [16] 16  SpO2:  [98 %] 98 %  BP: (119)/(74) 119/74        Body mass index is 20.98 kg/m².  Body surface  area is 1.66 meters squared.    Lines/Drains/Airways       Central Venous Catheter Line  Duration             Port A Cath Single Lumen Subclavian Right -- days                    Physical Exam  Vitals reviewed.   Constitutional:       General: He is not in acute distress.     Appearance: Normal appearance. He is well-developed. He is not toxic-appearing.   HENT:      Head: Normocephalic and atraumatic.      Right Ear: External ear normal.      Left Ear: External ear normal.      Mouth/Throat:      Pharynx: No oropharyngeal exudate or posterior oropharyngeal erythema.   Eyes:      Extraocular Movements: Extraocular movements intact.      Conjunctiva/sclera: Conjunctivae normal.   Cardiovascular:      Rate and Rhythm: Normal rate and regular rhythm.      Pulses: Normal pulses.      Heart sounds: Normal heart sounds. No murmur heard.  Pulmonary:      Effort: Pulmonary effort is normal. No respiratory distress.      Breath sounds: Normal breath sounds. No stridor. No wheezing or rales.   Abdominal:      General: Bowel sounds are normal.      Palpations: Abdomen is soft.      Tenderness: There is no abdominal tenderness.   Musculoskeletal:         General: Normal range of motion.      Cervical back: Normal range of motion and neck supple.      Right lower leg: No edema.      Left lower leg: No edema.   Skin:     General: Skin is warm and dry.      Findings: No rash.      Comments: Very dry skin   Neurological:      General: No focal deficit present.      Mental Status: He is alert and oriented to person, place, and time.   Psychiatric:         Mood and Affect: Mood normal.         Behavior: Behavior normal.         Thought Content: Thought content normal.         Judgment: Judgment normal.       Significant Labs:   All pertinent labs from prior to admission have been reviewed.    Diagnostic Results:  I have reviewed all pertinent imaging results/findings within the past 24 hours.

## 2023-03-29 NOTE — HPI
Mr. Martinez is a 67 yo M, patient of Dr. Sarkar, with newly diagnosed bulky DLBCL (extensive stomach and GI involvement). Other PMHx includes GERD, HLD, HTN. He originally presented to an OSH with epigastric pain in February 2023. He was found to have an esophageal mass. He underwent endoscopy and biopsy of GI mass on 2/20/23 which revealed DLBCL with Ki-67 99%, CD30 neg, CD20 positive. PET scan 3/23 revealed a diffuse lymphadenopathy above/below diaphragm with a mediastinal SUV 28, an infiltrative stomach mass/stomach thickening with SUV 42, and a large wanda conglomerate in his mid abdomen with SUV 36. Decision was made to proceed with R-CHOP therapy. Patient will only receive CHOP for cycle 1 (holding rituxan due to risk of GI perf). He will start Rituxan with C2 and receive with subsequent cycles. Right port placed prior to admission. ECHO with EF 60% and grade I diastolic dysfunction.     On admission for initiation of CHOP/TLS monitoring, he endorses weight loss of 30 pounds over 6 months. He has difficulty swallowing due to the mass and has been on a soft diet and taking morphine solution for pain. Will consult nutrition/dietician on admission. Continue GI soft diet. Due to bulky disease, will monitor TLS labs Q8 hours and increase allopurinol to BID. Can reduce frequency once no evidence of TLS. He denies fevers, chills, chest pain, sob. He has occasional nausea controlled with PRN antiemetics. He also reports constipation and gas in which he takes MOM for. COVID negative prior to admission. Dr. Sarkar to sign chemotherapy and is okay to proceed without repeat labs this evening

## 2023-03-29 NOTE — H&P
Vamsi Vela - Oncology ([facility])  Hematology  Bone Marrow Transplant  H&P    Subjective:     Principal Problem: Diffuse large B-cell lymphoma of lymph nodes of multiple regions    HPI: Mr. Martinez is a 67 yo M, patient of Dr. Sarkar, with newly diagnosed bulky DLBCL (extensive stomach and GI involvement). Other PMHx includes GERD, HLD, HTN. He originally presented to an OSH with epigastric pain in February 2023. He was found to have an esophageal mass. He underwent endoscopy and biopsy of GI mass on 2/20/23 which revealed DLBCL with Ki-67 99%, CD30 neg, CD20 positive. PET scan 3/23 revealed a diffuse lymphadenopathy above/below diaphragm with a mediastinal SUV 28, an infiltrative stomach mass/stomach thickening with SUV 42, and a large wanda conglomerate in his mid abdomen with SUV 36. Decision was made to proceed with R-CHOP therapy. Patient will only receive CHOP for cycle 1 (holding rituxan due to risk of GI perf). He will start Rituxan with C2 and receive with subsequent cycles. Right port placed prior to admission. ECHO with EF 60% and grade I diastolic dysfunction.     On admission for initiation of CHOP/TLS monitoring, he endorses weight loss of 30 pounds over 6 months. He has difficulty swallowing due to the mass and has been on a soft diet and taking morphine solution for pain. Will consult nutrition/dietician on admission. Continue GI soft diet. Due to bulky disease, will monitor TLS labs Q8 hours and increase allopurinol to BID. Can reduce frequency once no evidence of TLS. He denies fevers, chills, chest pain, sob. He has occasional nausea controlled with PRN antiemetics. He also reports constipation and gas in which he takes MOM for. COVID negative prior to admission. Dr. Sarkar to sign chemotherapy and is okay to proceed without repeat labs this evening        Patient information was obtained from patient, spouse/SO, and past medical records.     Oncology History:   - Patient of Dr. Sarkar    - Originally  presented to an OSH with epigastric pain in February 2023. Found to have an esophageal mass.   - 2/20/23 biopsy of GI mass revealed DLBCL with Ki-67 99%, CD30 neg, CD20 positive.   - 3/23/23 PET revealed a diffuse lymphadenopathy above/below diaphragm with a mediastinal SUV 28, an infiltrative stomach mass/stomach thickening with SUV 42, and a large wanda conglomerate in his mid abdomen with SUV 36.   - never had a bone marrow biopsy; marcia per Dr. Sarkar to proceed without  - Port placed 3/17/23   - 3/08/23: ECHO with EF 60% and grade I diastolic dysfunction; tricuspid regurg      Now admitted for C1 [facility]    Facility-Administered Medications Prior to Admission   Medication Dose Route Frequency Provider Last Rate Last Admin    ondansetron injection 8 mg  8 mg Intravenous Once Nicole Dia NP         Medications Prior to Admission   Medication Sig Dispense Refill Last Dose    acetaminophen (TYLENOL) 325 MG tablet Take 325 mg by mouth every 6 (six) hours as needed for Pain.       acyclovir (ZOVIRAX) 400 MG tablet Take 1 tablet (400 mg total) by mouth 2 (two) times daily. 60 tablet 5     allopurinoL (ZYLOPRIM) 100 MG tablet Take 3 tablets (300 mg total) by mouth once daily. 30 tablet 2     azelastine (ASTELIN) 137 mcg (0.1 %) nasal spray 1 spray (137 mcg total) by Nasal route 2 (two) times daily. 30 mL 3     hydrocodone-acetaminophen (HYCET) solution 7.5-325 mg/15mL Take 15 mLs by mouth every 6 (six) hours as needed for Pain. 472 mL 0     HYDROcodone-acetaminophen (LORTAB ELIXIR)  mg/15 mL Soln Take 15 mLs by mouth every 4 (four) hours as needed (pain). 200 mL 0     KENALOG 40 mg/mL injection        LIDOcaine-prilocaine (EMLA) cream Apply topically as needed. 30 g 0     lisinopriL (PRINIVIL,ZESTRIL) 40 MG tablet Take 1 tablet (40 mg total) by mouth once daily. 90 tablet 11     magnesium hydroxide 400 mg/5 ml (MILK OF MAGNESIA) 400 mg/5 mL Susp Take 30 mLs (2,400 mg total) by mouth daily as needed.  118 mL 2     morphine 10 mg/5 mL solution SMARTSI.5 Milliliter(s) By Mouth Every 6 Hours PRN       omeprazole (PRILOSEC) 40 MG capsule Take 1 capsule (40 mg total) by mouth 2 (two) times daily before meals. 180 capsule 1     ondansetron (ZOFRAN) 8 MG tablet Take 1 tablet (8 mg total) by mouth every 12 (twelve) hours as needed for Nausea. 30 tablet 2     predniSONE (DELTASONE) 50 MG Tab Take 2 tablets (100 mg total) by mouth As instructed. Take 100 mg daily in the morning for 5 days once every 21 days 60 tablet 0     promethazine (PHENERGAN) 12.5 MG Tab Take 2 tablets (25 mg total) by mouth every 6 (six) hours as needed (Severe Nausea). 30 tablet 0     sildenafiL (VIAGRA) 100 MG tablet Take 1 tablet (100 mg total) by mouth as needed for Erectile Dysfunction. 12 tablet 1        Amoxicillin     Past Medical History:   Diagnosis Date    Erectile dysfunction     GERD (gastroesophageal reflux disease)     Hyperlipidemia     Hypertension      Past Surgical History:   Procedure Laterality Date    COLONOSCOPY N/A 2017    Procedure: COLONOSCOPY;  Surgeon: AMBER Restrepo MD;  Location: [facility] (4TH FLR);  Service: Endoscopy;  Laterality: N/A;    ENDOSCOPIC ULTRASOUND OF UPPER GASTROINTESTINAL TRACT N/A 2023    Procedure: ULTRASOUND, UPPER GI TRACT, ENDOSCOPIC;  Surgeon: Ady Cornell MD;  Location: [facility] (2ND FLR);  Service: Endoscopy;  Laterality: N/A;    ESOPHAGOGASTRODUODENOSCOPY N/A 2023    Procedure: EGD (ESOPHAGOGASTRODUODENOSCOPY);  Surgeon: Ady Cornell MD;  Location: [facility] (2ND FLR);  Service: Endoscopy;  Laterality: N/A;    INSERTION OF TUNNELED CENTRAL VENOUS CATHETER (CVC) WITH SUBCUTANEOUS PORT Right 3/17/2023    Procedure: INSERTION, PORT-A-CATH;  Surgeon: Will Corrales II, MD;  Location: [facility] CATH LAB;  Service: Interventional Radiology;  Laterality: Right;    TOTAL KNEE ARTHROPLASTY  2010    right     Family History       Problem Relation (Age of  Onset)    Diabetes Mother    Kidney disease Son    Stroke Father          Tobacco Use    Smoking status: Former     Years: 10.00     Types: Cigarettes     Quit date: 2000     Years since quittin.7    Smokeless tobacco: Never   Substance and Sexual Activity    Alcohol use: Yes     Comment: beers 5 d per week, 3 at a time    Drug use: No    Sexual activity: Not on file       Review of Systems   Constitutional:  Positive for appetite change and fatigue. Negative for chills, diaphoresis and fever.   HENT:  Negative for congestion, ear pain, mouth sores, nosebleeds, postnasal drip, rhinorrhea, sinus pain, sore throat and trouble swallowing.    Eyes:  Negative for pain, redness and visual disturbance.   Respiratory:  Negative for cough, chest tightness, shortness of breath, wheezing and stridor.    Cardiovascular:  Negative for chest pain, palpitations and leg swelling.   Gastrointestinal:  Positive for constipation and nausea. Negative for abdominal distention, abdominal pain, blood in stool, diarrhea and vomiting.   Genitourinary:  Negative for hematuria.   Musculoskeletal:  Negative for arthralgias and back pain.   Skin:  Negative for rash.   Neurological:  Negative for dizziness, syncope, weakness, light-headedness, numbness and headaches.   Hematological:  Negative for adenopathy. Does not bruise/bleed easily.   Psychiatric/Behavioral:  Negative for confusion. The patient is not nervous/anxious.    Objective:     Vital Signs (Most Recent):  Temp: 97.4 °F (36.3 °C) (23 1455)  Pulse: 86 (23 1455)  Resp: 16 (23 1455)  BP: 119/74 (23 1455)  SpO2: 98 % (23 1455) Vital Signs (24h Range):  Temp:  [97.4 °F (36.3 °C)] 97.4 °F (36.3 °C)  Pulse:  [86] 86  Resp:  [16] 16  SpO2:  [98 %] 98 %  BP: (119)/(74) 119/74        Body mass index is 20.98 kg/m².  Body surface area is 1.66 meters squared.    Lines/Drains/Airways       Central Venous Catheter Line  Duration             Port A Cath  Single Lumen Subclavian Right -- days                    Physical Exam  Vitals reviewed.   Constitutional:       General: He is not in acute distress.     Appearance: Normal appearance. He is well-developed. He is not toxic-appearing.   HENT:      Head: Normocephalic and atraumatic.      Right Ear: External ear normal.      Left Ear: External ear normal.      Mouth/Throat:      Pharynx: No oropharyngeal exudate or posterior oropharyngeal erythema.   Eyes:      Extraocular Movements: Extraocular movements intact.      Conjunctiva/sclera: Conjunctivae normal.   Cardiovascular:      Rate and Rhythm: Normal rate and regular rhythm.      Pulses: Normal pulses.      Heart sounds: Normal heart sounds. No murmur heard.  Pulmonary:      Effort: Pulmonary effort is normal. No respiratory distress.      Breath sounds: Normal breath sounds. No stridor. No wheezing or rales.   Abdominal:      General: Bowel sounds are normal.      Palpations: Abdomen is soft.      Tenderness: There is no abdominal tenderness.   Musculoskeletal:         General: Normal range of motion.      Cervical back: Normal range of motion and neck supple.      Right lower leg: No edema.      Left lower leg: No edema.   Skin:     General: Skin is warm and dry.      Findings: No rash.      Comments: Very dry skin   Neurological:      General: No focal deficit present.      Mental Status: He is alert and oriented to person, place, and time.   Psychiatric:         Mood and Affect: Mood normal.         Behavior: Behavior normal.         Thought Content: Thought content normal.         Judgment: Judgment normal.       Significant Labs:   All pertinent labs from prior to admission have been reviewed.    Diagnostic Results:  I have reviewed all pertinent imaging results/findings within the past 24 hours.    Assessment/Plan:     Cardiac/Vascular  Hyperlipidemia  - Not currently taking any medication     Hypertension  - Continue home lisinopril daily    Oncology  *  Diffuse large B-cell lymphoma of lymph nodes of multiple regions  - Patient of Dr. Sarkar    - Originally presented to an OSH with epigastric pain in February 2023. Found to have an esophageal mass.   - 2/20/23 biopsy of GI mass revealed DLBCL with Ki-67 99%, CD30 neg, CD20 positive.   - 3/23/23 PET revealed a diffuse lymphadenopathy above/below diaphragm with a mediastinal SUV 28, an infiltrative stomach mass/stomach thickening with SUV 42, and a large wanda conglomerate in his mid abdomen with SUV 36.  - per marcia Tucker to proceed without bone marrow biopsy   - Port placed 3/17/23   - 3/08/23: ECHO with EF 60% and grade I diastolic dysfunction; tricuspid regurg   - Hep B / HIV negative   - Hep C pending on admission   - TLS ppx: BID allopurinol and IVF   - TLS monitoring: TID labs; can reduce frequency once no evidence of TLS    - Start R-CHOP therapy. Patient will only receive CHOP this cycle (due to GI perf risk); Rituxan will start with subsequent cycles with C2.    - Note Cycle 1's Rituxan will NOT be added on at the end of treatment   - Ppx: acyclovir  - LP with IT chemo has been requested for 3/30 or 3/31; per Dr. Sarkar he will need it with at least first 4 cycles    GI  Gas pain  - TID simethicone ordered on admit    Other constipation  - prn MOM available    Epigastric abdominal pain  - Continue PRN morphine solution Q6 hours    Esophageal mass  - See DLBCL     GERD (gastroesophageal reflux disease)  - Home omeprazole non formulary, convert to Protonix while inpatient         VTE Risk Mitigation (From admission, onward)         Ordered     IP VTE HIGH RISK PATIENT  Once         03/29/23 1454     Place sequential compression device  Until discontinued         03/29/23 1454                Disposition: Admit to inpatient BMT unit for cycle 1 of CHOP    Melida Souza NP  Bone Marrow Transplant  Hematology  [facility] ([facility])

## 2023-03-29 NOTE — ASSESSMENT & PLAN NOTE
- Patient of Dr. Sarkar    - Originally presented to an OSH with epigastric pain in February 2023. Found to have an esophageal mass.   - 2/20/23 biopsy of GI mass revealed DLBCL with Ki-67 99%, CD30 neg, CD20 positive.   - 3/23/23 PET revealed a diffuse lymphadenopathy above/below diaphragm with a mediastinal SUV 28, an infiltrative stomach mass/stomach thickening with SUV 42, and a large wanda conglomerate in his mid abdomen with SUV 36.  - per marcia Tucker to proceed without bone marrow biopsy   - Port placed 3/17/23   - 3/08/23: ECHO with EF 60% and grade I diastolic dysfunction; tricuspid regurg   - Hep B / HIV negative   - Hep C pending on admission   - TLS ppx: BID allopurinol and IVF   - TLS monitoring: TID labs; can reduce frequency once no evidence of TLS    - Start R-CHOP therapy. Patient will only receive CHOP this cycle (due to GI perf risk); Rituxan will start with subsequent cycles with C2.    - Note Cycle 1's Rituxan will NOT be added on at the end of treatment   - Ppx: acyclovir  - LP with IT chemo has been requested for 3/30 or 3/31; per Dr. Sarkar he will need it with at least first 4 cycles

## 2023-03-29 NOTE — NURSING
Patient admitted around 1500 for C1 CHOP. A&Ox4, up independently. Port accessed with brisk blood return. Patient to receive chemo this evening.

## 2023-03-30 LAB
ALBUMIN SERPL BCP-MCNC: 2.3 G/DL (ref 3.5–5.2)
ALBUMIN SERPL BCP-MCNC: 2.5 G/DL (ref 3.5–5.2)
ALP SERPL-CCNC: 47 U/L (ref 55–135)
ALP SERPL-CCNC: 48 U/L (ref 55–135)
ALT SERPL W/O P-5'-P-CCNC: 8 U/L (ref 10–44)
ALT SERPL W/O P-5'-P-CCNC: <5 U/L (ref 10–44)
ANION GAP SERPL CALC-SCNC: 13 MMOL/L (ref 8–16)
ANION GAP SERPL CALC-SCNC: 9 MMOL/L (ref 8–16)
AST SERPL-CCNC: 18 U/L (ref 10–40)
AST SERPL-CCNC: 19 U/L (ref 10–40)
BASOPHILS # BLD AUTO: 0.01 K/UL (ref 0–0.2)
BASOPHILS NFR BLD: 0.2 % (ref 0–1.9)
BILIRUB SERPL-MCNC: 0.4 MG/DL (ref 0.1–1)
BILIRUB SERPL-MCNC: 0.4 MG/DL (ref 0.1–1)
BUN SERPL-MCNC: 13 MG/DL (ref 8–23)
BUN SERPL-MCNC: 14 MG/DL (ref 8–23)
CALCIUM SERPL-MCNC: 8.3 MG/DL (ref 8.7–10.5)
CALCIUM SERPL-MCNC: 8.7 MG/DL (ref 8.7–10.5)
CHLORIDE SERPL-SCNC: 98 MMOL/L (ref 95–110)
CHLORIDE SERPL-SCNC: 98 MMOL/L (ref 95–110)
CO2 SERPL-SCNC: 21 MMOL/L (ref 23–29)
CO2 SERPL-SCNC: 22 MMOL/L (ref 23–29)
CREAT SERPL-MCNC: 0.8 MG/DL (ref 0.5–1.4)
CREAT SERPL-MCNC: 0.8 MG/DL (ref 0.5–1.4)
DIFFERENTIAL METHOD: ABNORMAL
EOSINOPHIL # BLD AUTO: 0 K/UL (ref 0–0.5)
EOSINOPHIL NFR BLD: 0 % (ref 0–8)
ERYTHROCYTE [DISTWIDTH] IN BLOOD BY AUTOMATED COUNT: 15.8 % (ref 11.5–14.5)
EST. GFR  (NO RACE VARIABLE): >60 ML/MIN/1.73 M^2
EST. GFR  (NO RACE VARIABLE): >60 ML/MIN/1.73 M^2
GLUCOSE SERPL-MCNC: 104 MG/DL (ref 70–110)
GLUCOSE SERPL-MCNC: 105 MG/DL (ref 70–110)
HCT VFR BLD AUTO: 26.9 % (ref 40–54)
HCV AB SERPL QL IA: NORMAL
HGB BLD-MCNC: 8.5 G/DL (ref 14–18)
IMM GRANULOCYTES # BLD AUTO: 0.02 K/UL (ref 0–0.04)
IMM GRANULOCYTES NFR BLD AUTO: 0.5 % (ref 0–0.5)
LDH SERPL L TO P-CCNC: 699 U/L (ref 110–260)
LDH SERPL L TO P-CCNC: 753 U/L (ref 110–260)
LYMPHOCYTES # BLD AUTO: 0.3 K/UL (ref 1–4.8)
LYMPHOCYTES NFR BLD: 8 % (ref 18–48)
MAGNESIUM SERPL-MCNC: 1.6 MG/DL (ref 1.6–2.6)
MCH RBC QN AUTO: 23.1 PG (ref 27–31)
MCHC RBC AUTO-ENTMCNC: 31.6 G/DL (ref 32–36)
MCV RBC AUTO: 73 FL (ref 82–98)
MONOCYTES # BLD AUTO: 0.1 K/UL (ref 0.3–1)
MONOCYTES NFR BLD: 1.7 % (ref 4–15)
NEUTROPHILS # BLD AUTO: 3.8 K/UL (ref 1.8–7.7)
NEUTROPHILS NFR BLD: 89.6 % (ref 38–73)
NRBC BLD-RTO: 0 /100 WBC
PHOSPHATE SERPL-MCNC: 3.5 MG/DL (ref 2.7–4.5)
PHOSPHATE SERPL-MCNC: 3.5 MG/DL (ref 2.7–4.5)
PLATELET # BLD AUTO: 182 K/UL (ref 150–450)
PMV BLD AUTO: 10.7 FL (ref 9.2–12.9)
POTASSIUM SERPL-SCNC: 4.2 MMOL/L (ref 3.5–5.1)
POTASSIUM SERPL-SCNC: 4.4 MMOL/L (ref 3.5–5.1)
PROT SERPL-MCNC: 5.4 G/DL (ref 6–8.4)
PROT SERPL-MCNC: 5.8 G/DL (ref 6–8.4)
RBC # BLD AUTO: 3.68 M/UL (ref 4.6–6.2)
SODIUM SERPL-SCNC: 129 MMOL/L (ref 136–145)
SODIUM SERPL-SCNC: 132 MMOL/L (ref 136–145)
URATE SERPL-MCNC: 3.9 MG/DL (ref 3.4–7)
URATE SERPL-MCNC: 4.1 MG/DL (ref 3.4–7)
WBC # BLD AUTO: 4.24 K/UL (ref 3.9–12.7)

## 2023-03-30 PROCEDURE — 85025 COMPLETE CBC W/AUTO DIFF WBC: CPT | Mod: HCNC | Performed by: NURSE PRACTITIONER

## 2023-03-30 PROCEDURE — 83735 ASSAY OF MAGNESIUM: CPT | Mod: HCNC | Performed by: NURSE PRACTITIONER

## 2023-03-30 PROCEDURE — 63600175 PHARM REV CODE 636 W HCPCS: Mod: HCNC | Performed by: INTERNAL MEDICINE

## 2023-03-30 PROCEDURE — 80053 COMPREHEN METABOLIC PANEL: CPT | Mod: 91,HCNC | Performed by: NURSE PRACTITIONER

## 2023-03-30 PROCEDURE — 84550 ASSAY OF BLOOD/URIC ACID: CPT | Mod: 91,HCNC | Performed by: NURSE PRACTITIONER

## 2023-03-30 PROCEDURE — 25000003 PHARM REV CODE 250: Mod: HCNC | Performed by: NURSE PRACTITIONER

## 2023-03-30 PROCEDURE — 83615 LACTATE (LD) (LDH) ENZYME: CPT | Mod: 91,HCNC | Performed by: NURSE PRACTITIONER

## 2023-03-30 PROCEDURE — 99233 SBSQ HOSP IP/OBS HIGH 50: CPT | Mod: HCNC,,, | Performed by: INTERNAL MEDICINE

## 2023-03-30 PROCEDURE — 80053 COMPREHEN METABOLIC PANEL: CPT | Mod: HCNC | Performed by: INTERNAL MEDICINE

## 2023-03-30 PROCEDURE — 86803 HEPATITIS C AB TEST: CPT | Mod: HCNC | Performed by: NURSE PRACTITIONER

## 2023-03-30 PROCEDURE — 83615 LACTATE (LD) (LDH) ENZYME: CPT | Mod: HCNC | Performed by: INTERNAL MEDICINE

## 2023-03-30 PROCEDURE — 20600001 HC STEP DOWN PRIVATE ROOM: Mod: HCNC

## 2023-03-30 PROCEDURE — 84100 ASSAY OF PHOSPHORUS: CPT | Mod: HCNC | Performed by: INTERNAL MEDICINE

## 2023-03-30 PROCEDURE — 99233 PR SUBSEQUENT HOSPITAL CARE,LEVL III: ICD-10-PCS | Mod: HCNC,,, | Performed by: INTERNAL MEDICINE

## 2023-03-30 PROCEDURE — 84100 ASSAY OF PHOSPHORUS: CPT | Mod: 91,HCNC | Performed by: NURSE PRACTITIONER

## 2023-03-30 PROCEDURE — 84550 ASSAY OF BLOOD/URIC ACID: CPT | Mod: HCNC | Performed by: INTERNAL MEDICINE

## 2023-03-30 RX ORDER — ALLOPURINOL 300 MG/1
300 TABLET ORAL DAILY
Status: DISCONTINUED | OUTPATIENT
Start: 2023-03-30 | End: 2023-03-31 | Stop reason: HOSPADM

## 2023-03-30 RX ORDER — SODIUM CHLORIDE 9 MG/ML
INJECTION, SOLUTION INTRAVENOUS CONTINUOUS
Status: DISCONTINUED | OUTPATIENT
Start: 2023-03-30 | End: 2023-03-31 | Stop reason: HOSPADM

## 2023-03-30 RX ADMIN — SODIUM CHLORIDE: 9 INJECTION, SOLUTION INTRAVENOUS at 07:03

## 2023-03-30 RX ADMIN — SIMETHICONE 80 MG: 80 TABLET, CHEWABLE ORAL at 09:03

## 2023-03-30 RX ADMIN — AZELASTINE 137 MCG: 1 SPRAY, METERED NASAL at 09:03

## 2023-03-30 RX ADMIN — PROMETHAZINE HYDROCHLORIDE 25 MG: 25 TABLET ORAL at 06:03

## 2023-03-30 RX ADMIN — ACYCLOVIR 400 MG: 200 CAPSULE ORAL at 09:03

## 2023-03-30 RX ADMIN — SODIUM CHLORIDE: 9 INJECTION, SOLUTION INTRAVENOUS at 06:03

## 2023-03-30 RX ADMIN — SIMETHICONE 80 MG: 80 TABLET, CHEWABLE ORAL at 01:03

## 2023-03-30 RX ADMIN — PANTOPRAZOLE SODIUM 40 MG: 40 TABLET, DELAYED RELEASE ORAL at 09:03

## 2023-03-30 RX ADMIN — PREDNISONE 100 MG: 50 TABLET ORAL at 04:03

## 2023-03-30 RX ADMIN — ALLOPURINOL 300 MG: 300 TABLET ORAL at 09:03

## 2023-03-30 RX ADMIN — MORPHINE SULFATE 15 MG: 10 SOLUTION ORAL at 09:03

## 2023-03-30 RX ADMIN — SIMETHICONE 80 MG: 80 TABLET, CHEWABLE ORAL at 07:03

## 2023-03-30 RX ADMIN — LISINOPRIL 40 MG: 20 TABLET ORAL at 09:03

## 2023-03-30 NOTE — CONSULTS
[facility]i Vela - Oncology ([facility])  Adult Nutrition  Consult Note    SUMMARY     Recommendations    1. Continue Dysphagia Soft (IDDSI Level 6) Diet.   2. Add Optisource VHP (RD ordered) with all meals to optimize calorie/protein intake.   3. Recommend Daily Weights.   4. Recommend psyllium husk for constipation.   5. RD to monitor and follow-up.    Goals: Meet % EEN/EPN needs by follow-up.  Nutrition Goal Status: new  Communication of RD Recs: other (comment) (POC)    Assessment and Plan    Nutrition Problem:  Moderate/Severe Protein-Calorie Malnutrition  Malnutrition in the context of Chronic Illness/Injury    Related to (etiology):  Decreased ability to consume sufficient energy/protein    Signs and Symptoms (as evidenced by):  Energy Intake: <50% of estimated energy requirement for > 3 months  Body Fat Depletion: severe depletion of orbitals, triceps, and thoracic and lumbar region   Muscle Mass Depletion: moderate and severe depletion of temples, clavicle region, scapular region, interosseous muscle, and lower extremities   Weight Loss: 19% x 6 months       Interventions(treatment strategy):  Collaboration of nutrition care and other providers  High-Calorie, High-Protein Diet  ONS    Nutrition Diagnosis Status:  New          Malnutrition Assessment  Malnutrition Type: chronic illness  Energy Intake: severe energy intake  Skin (Micronutrient): dry, scaly, thinned  Teeth (Micronutrient): broken dentition  Neck/Chest (Micronutrient): bony prominence, muscle wasting, subcutaneous fat loss  Musculoskeletal/Lower Extremities: subcutaneous fat loss       Weight Loss (Malnutrition): greater than 10% in 6 months  Energy Intake (Malnutrition): less than 75% for greater than or equal to 3 months   Orbital Region (Subcutaneous Fat Loss): severe depletion  Upper Arm Region (Subcutaneous Fat Loss): severe depletion  Thoracic and Lumbar Region: severe depletion   [place] Region (Muscle Loss): severe depletion  Clavicle Bone  "Region (Muscle Loss): moderate depletion  Clavicle and Acromion Bone Region (Muscle Loss): severe depletion  Scapular Bone Region (Muscle Loss): severe depletion  Dorsal Hand (Muscle Loss): moderate depletion  Patellar Region (Muscle Loss): moderate depletion  Anterior Thigh Region (Muscle Loss): moderate depletion  Posterior Calf Region (Muscle Loss): moderate depletion       Subcutaneous Fat Loss (Final Summary): severe protein-calorie malnutrition  Muscle Loss Evaluation (Final Summary): moderate protein-calorie malnutrition    Severe Weight Loss (Malnutrition): other (see comments) (19% x 6 months)    Reason for Assessment    Reason For Assessment: consult  Diagnosis:  (Diffuse large B-cell lymphoma of lymph nodes of multiple regions)  Relevant Medical History: GERD, HLD, HTN  Interdisciplinary Rounds: did not attend  General Information Comments: RD consulted for ~30 (19%) x 6 months, which is significant. Pt meets criteria for malnutrition. Hx of dysphagia (mass in throat makes it hard to swallow). N/C noted, LBM 3/30 (small). No V/D. UBW ~158lbs. PO intake ~75% at meals. At home patient drink Boost original 1x/day. NFPE complete 3/30, Moderate/severe fat/muscle wasting noted. Please see PES for details.  Nutrition Discharge Planning: Pending Clinical Course, High-Calorie, High-Protein Nutrition Therapy education provided on 3/30.    Nutrition Risk Screen    Nutrition Risk Screen: unintentional loss of 10 lbs or more in the past 2 months    Nutrition/Diet History    Patient Reported Diet/Restrictions/Preferences: general  Food Preferences: soft foods grits, scrambled eggs, applesauce  Supplemental Drinks or Food Habits: Boost Original  Food Allergies: NKFA  Factors Affecting Nutritional Intake: nausea/vomiting, abdominal pain, constipation, painful swallowing, early satiety, decreased appetite    Anthropometrics    Temp: 97.6 °F (36.4 °C)  Height Method: Stated  Height: 5' 6" (167.6 cm)  Height (inches): 66 " in  Weight Method: Standard Scale  Weight: 58.1 kg (128 lb 1.4 oz)  Weight (lb): 128.09 lb  Ideal Body Weight (IBW), Male: 142 lb  % Ideal Body Weight, Male (lb): 91.44 %  BMI (Calculated): 20.7  BMI Grade: 18.5-24.9 - normal  Weight Loss: unintentional  Usual Body Weight (UBW), k.8 kg  Weight Change Amount: 30 lb  % Usual Body Weight: 81.09       Lab/Procedures/Meds    Pertinent Labs Reviewed: reviewed  Pertinent Labs Comments: H/H 8.5/26.9, MCHC 31.6, Na 132, CO2 21, ALT <5  Pertinent Medications Reviewed: reviewed  Pertinent Medications Comments: lisinopril, pantoprazole, prednisone, NaCl      Estimated/Assessed Needs    Weight Used For Calorie Calculations: 64.4 kg (142 lb)  Energy Calorie Requirements (kcal): 2125 kcal/day (33 kcal/kg, IBW)  Energy Need Method: Kcal/kg  Protein Requirements: 97 g/day (1.5 g/kg, IBW)  Weight Used For Protein Calculations: 64.4 kg (142 lb)        RDA Method (mL): 2125         Nutrition Prescription Ordered    Current Diet Order: Dysphagia Soft (IDDSI Level 6)    Evaluation of Received Nutrient/Fluid Intake    I/O: +1.15 L  Comments: LBM: 3/26  Tolerance: tolerating  % Intake of Estimated Energy Needs: 75 - 100 %  % Meal Intake: 75 - 100 %    Nutrition Risk    Level of Risk/Frequency of Follow-up:  (2x/ week)       Monitor and Evaluation    Food and Nutrient Intake: energy intake, food and beverage intake  Food and Nutrient Adminstration: diet order  Knowledge/Beliefs/Attitudes: food and nutrition knowledge/skill, beliefs and attitudes  Physical Activity and Function: nutrition-related ADLs and IADLs, factors affecting access to physical activity  Anthropometric Measurements: weight change, weight, body mass index  Biochemical Data, Medical Tests and Procedures: glucose/endocrine profile, inflammatory profile, lipid profile, gastrointestinal profile, electrolyte and renal panel  Nutrition-Focused Physical Findings: overall appearance, extremities, muscles and bones, head and  eyes, skin       Nutrition Follow-Up    RD Follow-up?: Yes    Flory Franklin Registration Eligible, Provisional LDN

## 2023-03-30 NOTE — PLAN OF CARE
Plan of care reviewed with pt at bedside. Pt remained alert and oriented x4, on room air, ambulatory by self and no complaints of pain. Pt to receive IT chemo tomorrow.     ZAINAB Barclay     Problem: Adult Inpatient Plan of Care  Goal: Plan of Care Review  Outcome: Ongoing, Progressing  Goal: Patient-Specific Goal (Individualized)  Outcome: Ongoing, Progressing  Goal: Absence of Hospital-Acquired Illness or Injury  Outcome: Ongoing, Progressing  Goal: Optimal Comfort and Wellbeing  Outcome: Ongoing, Progressing  Goal: Readiness for Transition of Care  Outcome: Ongoing, Progressing     Problem: Infection  Goal: Absence of Infection Signs and Symptoms  Outcome: Ongoing, Progressing

## 2023-03-30 NOTE — ASSESSMENT & PLAN NOTE
- Patient of Dr. Sarkar    - Originally presented to an OSH with epigastric pain in February 2023. Found to have an esophageal mass.   - 2/20/23 biopsy of GI mass revealed DLBCL with Ki-67 99%, CD30 neg, CD20 positive.   - 3/23/23 PET revealed a diffuse lymphadenopathy above/below diaphragm with a mediastinal SUV 28, an infiltrative stomach mass/stomach thickening with SUV 42, and a large wanda conglomerate in his mid abdomen with SUV 36.  - per marcia Tucker to proceed without bone marrow biopsy   - Port placed 3/17/23   - 3/08/23: ECHO with EF 60% and grade I diastolic dysfunction; tricuspid regurg   - Hep B / HIV negative   - Hep C negative  - TLS ppx: allopurinol daily and IVF   - TLS monitoring: daily    - Received CHOP on 3/29. Today is Day 2. Patient will only receive CHOP this cycle (due to GI perf risk); Rituxan will start with subsequent cycles with C2. Udenyca scheduled for Saturday 4/1   - Note Cycle 1's Rituxan will NOT be added on at the end of treatment (will only receive 5 total doses of rituxan)  - Ppx: acyclovir  - LP with IT chemo scheduled for 3/31; per Dr. Sarkar he will need it with at least first 4 cycles  - Plan for discharge 3/31 after LP if no further signs of TLS  - Scheduled for cycle 2 on 4/19

## 2023-03-30 NOTE — SUBJECTIVE & OBJECTIVE
Subjective:     Interval History: C1D2 CHOP. Received chemotherapy last night around 2130. Tolerated well without issue. Eating breakfast this morning on rounds, denies swallowing difficulties. No evidence of TLS on AM labs. Plan for LP with IT chemotherapy tomorrow. Afebrile, VSS    Objective:     Vital Signs (Most Recent):  Temp: 97.6 °F (36.4 °C) (03/30/23 0746)  Pulse: 69 (03/30/23 0746)  Resp: 17 (03/30/23 0746)  BP: 138/78 (03/30/23 0746)  SpO2: 95 % (03/30/23 0746) Vital Signs (24h Range):  Temp:  [97.4 °F (36.3 °C)-98 °F (36.7 °C)] 97.6 °F (36.4 °C)  Pulse:  [69-86] 69  Resp:  [16-17] 17  SpO2:  [95 %-98 %] 95 %  BP: (118-147)/(72-87) 138/78     Weight: 58.1 kg (128 lb 1.4 oz)  Body mass index is 20.67 kg/m².  Body surface area is 1.64 meters squared.    Intake/Output - Last 3 Shifts         03/28 0700  03/29 0659 03/29 0700  03/30 0659 03/30 0700  03/31 0659    P.O.   250    I.V. (mL/kg)   217.9 (3.8)    Total Intake(mL/kg)   467.9 (8.1)    Net   +467.9           Urine Occurrence   1 x            Physical Exam  Vitals and nursing note reviewed.   Constitutional:       General: He is not in acute distress.     Appearance: Normal appearance. He is well-developed. He is not toxic-appearing.   HENT:      Head: Normocephalic and atraumatic.      Right Ear: External ear normal.      Left Ear: External ear normal.      Mouth/Throat:      Pharynx: No oropharyngeal exudate or posterior oropharyngeal erythema.   Eyes:      Extraocular Movements: Extraocular movements intact.      Conjunctiva/sclera: Conjunctivae normal.   Cardiovascular:      Rate and Rhythm: Normal rate and regular rhythm.      Pulses: Normal pulses.      Heart sounds: Normal heart sounds. No murmur heard.  Pulmonary:      Effort: Pulmonary effort is normal. No respiratory distress.      Breath sounds: Normal breath sounds. No stridor. No wheezing or rales.   Abdominal:      General: Bowel sounds are normal.      Palpations: Abdomen is soft.       Tenderness: There is no abdominal tenderness.   Musculoskeletal:         General: Normal range of motion.      Cervical back: Normal range of motion and neck supple.      Right lower leg: No edema.      Left lower leg: No edema.   Skin:     General: Skin is warm and dry.      Findings: No rash.      Comments: Very dry skin  RCWP clean and dry with no signs of infection   Neurological:      General: No focal deficit present.      Mental Status: He is alert and oriented to person, place, and time.   Psychiatric:         Mood and Affect: Mood normal.         Behavior: Behavior normal.         Thought Content: Thought content normal.         Judgment: Judgment normal.       Significant Labs:   CBC:   Recent Labs   Lab 03/30/23  0344   WBC 4.24   HGB 8.5*   HCT 26.9*       and CMP:   Recent Labs   Lab 03/30/23  0522 03/30/23  0751   * 132*   K 4.4 4.2   CL 98 98   CO2 22* 21*    104   BUN 13 14   CREATININE 0.8 0.8   CALCIUM 8.3* 8.7   PROT 5.4* 5.8*   ALBUMIN 2.3* 2.5*   BILITOT 0.4 0.4   ALKPHOS 47* 48*   AST 18 19   ALT 8* <5*   ANIONGAP 9 13       Diagnostic Results:  None

## 2023-03-30 NOTE — PROGRESS NOTES
Vamsi Vela - Oncology ([facility])  Hematology  Bone Marrow Transplant  Progress Note    Patient Name: Jeremy Martinez  Admission Date: 3/29/2023  Hospital Length of Stay: 1 days  Code Status: Full Code    Subjective:     Interval History: C1D2 CHOP. Received chemotherapy last night around 2130. Tolerated well without issue. Eating breakfast this morning on rounds, denies swallowing difficulties. No evidence of TLS on AM labs. Plan for LP with IT chemotherapy tomorrow. Afebrile, VSS    Objective:     Vital Signs (Most Recent):  Temp: 97.6 °F (36.4 °C) (03/30/23 0746)  Pulse: 69 (03/30/23 0746)  Resp: 17 (03/30/23 0746)  BP: 138/78 (03/30/23 0746)  SpO2: 95 % (03/30/23 0746) Vital Signs (24h Range):  Temp:  [97.4 °F (36.3 °C)-98 °F (36.7 °C)] 97.6 °F (36.4 °C)  Pulse:  [69-86] 69  Resp:  [16-17] 17  SpO2:  [95 %-98 %] 95 %  BP: (118-147)/(72-87) 138/78     Weight: 58.1 kg (128 lb 1.4 oz)  Body mass index is 20.67 kg/m².  Body surface area is 1.64 meters squared.    Intake/Output - Last 3 Shifts         03/28 0700  03/29 0659 03/29 0700  03/30 0659 03/30 0700  03/31 0659    P.O.   250    I.V. (mL/kg)   217.9 (3.8)    Total Intake(mL/kg)   467.9 (8.1)    Net   +467.9           Urine Occurrence   1 x            Physical Exam  Vitals and nursing note reviewed.   Constitutional:       General: He is not in acute distress.     Appearance: Normal appearance. He is well-developed. He is not toxic-appearing.   HENT:      Head: Normocephalic and atraumatic.      Right Ear: External ear normal.      Left Ear: External ear normal.      Mouth/Throat:      Pharynx: No oropharyngeal exudate or posterior oropharyngeal erythema.   Eyes:      Extraocular Movements: Extraocular movements intact.      Conjunctiva/sclera: Conjunctivae normal.   Cardiovascular:      Rate and Rhythm: Normal rate and regular rhythm.      Pulses: Normal pulses.      Heart sounds: Normal heart sounds. No murmur heard.  Pulmonary:      Effort: Pulmonary effort is  normal. No respiratory distress.      Breath sounds: Normal breath sounds. No stridor. No wheezing or rales.   Abdominal:      General: Bowel sounds are normal.      Palpations: Abdomen is soft.      Tenderness: There is no abdominal tenderness.   Musculoskeletal:         General: Normal range of motion.      Cervical back: Normal range of motion and neck supple.      Right lower leg: No edema.      Left lower leg: No edema.   Skin:     General: Skin is warm and dry.      Findings: No rash.      Comments: Very dry skin  RCWP clean and dry with no signs of infection   Neurological:      General: No focal deficit present.      Mental Status: He is alert and oriented to person, place, and time.   Psychiatric:         Mood and Affect: Mood normal.         Behavior: Behavior normal.         Thought Content: Thought content normal.         Judgment: Judgment normal.       Significant Labs:   CBC:   Recent Labs   Lab 03/30/23  0344   WBC 4.24   HGB 8.5*   HCT 26.9*       and CMP:   Recent Labs   Lab 03/30/23  0522 03/30/23  0751   * 132*   K 4.4 4.2   CL 98 98   CO2 22* 21*    104   BUN 13 14   CREATININE 0.8 0.8   CALCIUM 8.3* 8.7   PROT 5.4* 5.8*   ALBUMIN 2.3* 2.5*   BILITOT 0.4 0.4   ALKPHOS 47* 48*   AST 18 19   ALT 8* <5*   ANIONGAP 9 13       Diagnostic Results:  None    Assessment/Plan:     * Diffuse large B-cell lymphoma of lymph nodes of multiple regions  - Patient of Dr. Sarkar    - Originally presented to an OSH with epigastric pain in February 2023. Found to have an esophageal mass.   - 2/20/23 biopsy of GI mass revealed DLBCL with Ki-67 99%, CD30 neg, CD20 positive.   - 3/23/23 PET revealed a diffuse lymphadenopathy above/below diaphragm with a mediastinal SUV 28, an infiltrative stomach mass/stomach thickening with SUV 42, and a large wanda conglomerate in his mid abdomen with SUV 36.  - per marcia Tucker to proceed without bone marrow biopsy   - Port placed 3/17/23   - 3/08/23: ECHO  with EF 60% and grade I diastolic dysfunction; tricuspid regurg   - Hep B / HIV negative   - Hep C negative  - TLS ppx: allopurinol daily and IVF   - TLS monitoring: daily    - Received CHOP on 3/29. Today is Day 2. Patient will only receive CHOP this cycle (due to GI perf risk); Rituxan will start with subsequent cycles with C2. Udenyca scheduled for Saturday 4/1   - Note Cycle 1's Rituxan will NOT be added on at the end of treatment (will only receive 5 total doses of rituxan)  - Ppx: acyclovir  - LP with IT chemo scheduled for 3/31; per Dr. Sarkar he will need it with at least first 4 cycles  - Plan for discharge 3/31 after LP if no further signs of TLS  - Scheduled for cycle 2 on 4/19    Gas pain  - TID simethicone ordered on admit    Other constipation  - prn MOM available    Epigastric abdominal pain  - Continue PRN morphine solution Q6 hours    Esophageal mass  - See DLBCL     GERD (gastroesophageal reflux disease)  - Home omeprazole non formulary, convert to Protonix while inpatient     Hyperlipidemia  - Not currently taking any medication     Hypertension  - Continue home lisinopril daily        VTE Risk Mitigation (From admission, onward)         Ordered     heparin, porcine (PF) 100 unit/mL injection flush 500 Units  As needed (PRN)         03/29/23 1628     IP VTE HIGH RISK PATIENT  Once         03/29/23 1454     Place sequential compression device  Until discontinued         03/29/23 1454                Disposition: Remains inpatient for TLS monitoring    Melida Souza NP  Bone Marrow Transplant  [facility]ela - Oncology ([facility])

## 2023-03-30 NOTE — PROGRESS NOTES
vinCRIStine (ONCOVIN) 2 mg in sodium chloride 0.9% 65 mL chemo infusion Over 5 min., DOXOrubicin chemo injection 84 mg and  cycloPHOSphamide 750 mg/m2 = 1,240 mg in sodium chloride 0.9% 291.2 mL chemo infusion over 60 min, started through R Chest port, noted for having positive blood return. Dosage and BSA checked by two chemotherapy certified nurses. Premedications given prior to starting infusion. Consent and order in chart/EPIC. Chemotherapy education completed at this time. Chemotherapeutic precautions in place throughout therapy. Will continue to monitor.

## 2023-03-30 NOTE — PLAN OF CARE
Recommendations    1. Continue Dysphagia Soft (IDDSI Level 6) Diet.   2. Add Optisource VHP (RD ordered) with all meals to optimize calorie/protein intake.   3. Recommend Daily Weights.   4. Recommend psyllium husk for constipation.   5. RD to monitor and follow-up.    Goals: Meet % EEN/EPN needs by follow-up.  Nutrition Goal Status: new  Communication of RD Recs: other (comment) (POC)

## 2023-03-30 NOTE — HOSPITAL COURSE
03/30/2023 C1D2 CHOP. Received chemotherapy last night around 2130. Tolerated well without issue. Eating breakfast this morning on rounds, denies swallowing difficulties. No evidence of TLS on AM labs. Plan for LP with IT chemotherapy tomorrow. Afebrile, VSS

## 2023-03-31 VITALS
SYSTOLIC BLOOD PRESSURE: 106 MMHG | TEMPERATURE: 98 F | DIASTOLIC BLOOD PRESSURE: 63 MMHG | WEIGHT: 128.06 LBS | BODY MASS INDEX: 20.58 KG/M2 | HEART RATE: 72 BPM | RESPIRATION RATE: 18 BRPM | OXYGEN SATURATION: 96 % | HEIGHT: 66 IN

## 2023-03-31 LAB
ALBUMIN SERPL BCP-MCNC: 2.2 G/DL (ref 3.5–5.2)
ALP SERPL-CCNC: 43 U/L (ref 55–135)
ALT SERPL W/O P-5'-P-CCNC: 7 U/L (ref 10–44)
ANION GAP SERPL CALC-SCNC: 9 MMOL/L (ref 8–16)
AST SERPL-CCNC: 18 U/L (ref 10–40)
BASOPHILS # BLD AUTO: 0 K/UL (ref 0–0.2)
BASOPHILS NFR BLD: 0 % (ref 0–1.9)
BILIRUB SERPL-MCNC: 0.2 MG/DL (ref 0.1–1)
BUN SERPL-MCNC: 15 MG/DL (ref 8–23)
CALCIUM SERPL-MCNC: 8.3 MG/DL (ref 8.7–10.5)
CHLORIDE SERPL-SCNC: 102 MMOL/L (ref 95–110)
CLARITY CSF: CLEAR
CO2 SERPL-SCNC: 21 MMOL/L (ref 23–29)
COLOR CSF: COLORLESS
CREAT SERPL-MCNC: 0.9 MG/DL (ref 0.5–1.4)
CSF TUBE NUMBER: 1
CSF TUBE NUMBER: 2
DIFFERENTIAL METHOD: ABNORMAL
EOSINOPHIL # BLD AUTO: 0 K/UL (ref 0–0.5)
EOSINOPHIL NFR BLD: 0 % (ref 0–8)
ERYTHROCYTE [DISTWIDTH] IN BLOOD BY AUTOMATED COUNT: 15.3 % (ref 11.5–14.5)
EST. GFR  (NO RACE VARIABLE): >60 ML/MIN/1.73 M^2
GLUCOSE CSF-MCNC: 100 MG/DL (ref 40–70)
GLUCOSE SERPL-MCNC: 116 MG/DL (ref 70–110)
HCT VFR BLD AUTO: 23.7 % (ref 40–54)
HGB BLD-MCNC: 7.6 G/DL (ref 14–18)
IMM GRANULOCYTES # BLD AUTO: 0.01 K/UL (ref 0–0.04)
IMM GRANULOCYTES NFR BLD AUTO: 0.3 % (ref 0–0.5)
LDH SERPL L TO P-CCNC: 613 U/L (ref 110–260)
LYMPHOCYTES # BLD AUTO: 0.3 K/UL (ref 1–4.8)
LYMPHOCYTES NFR BLD: 6.7 % (ref 18–48)
LYMPHOCYTES NFR CSF MANUAL: 24 % (ref 40–80)
MAGNESIUM SERPL-MCNC: 1.7 MG/DL (ref 1.6–2.6)
MCH RBC QN AUTO: 23.2 PG (ref 27–31)
MCHC RBC AUTO-ENTMCNC: 32.1 G/DL (ref 32–36)
MCV RBC AUTO: 72 FL (ref 82–98)
MONOCYTES # BLD AUTO: 0.1 K/UL (ref 0.3–1)
MONOCYTES NFR BLD: 3.8 % (ref 4–15)
MONOS+MACROS NFR CSF MANUAL: 72 % (ref 15–45)
NEUTROPHILS # BLD AUTO: 3.3 K/UL (ref 1.8–7.7)
NEUTROPHILS NFR BLD: 89.2 % (ref 38–73)
NEUTROPHILS NFR CSF MANUAL: 4 % (ref 0–6)
NRBC BLD-RTO: 0 /100 WBC
PHOSPHATE SERPL-MCNC: 3.3 MG/DL (ref 2.7–4.5)
PLATELET # BLD AUTO: 205 K/UL (ref 150–450)
PMV BLD AUTO: 11.3 FL (ref 9.2–12.9)
POTASSIUM SERPL-SCNC: 4.2 MMOL/L (ref 3.5–5.1)
PROT CSF-MCNC: 18 MG/DL (ref 15–40)
PROT SERPL-MCNC: 5 G/DL (ref 6–8.4)
RBC # BLD AUTO: 3.28 M/UL (ref 4.6–6.2)
RBC # CSF: 8 /CU MM
SODIUM SERPL-SCNC: 132 MMOL/L (ref 136–145)
SPECIMEN VOL CSF: 4.5 ML
URATE SERPL-MCNC: 3.6 MG/DL (ref 3.4–7)
WBC # BLD AUTO: 3.72 K/UL (ref 3.9–12.7)
WBC # CSF: 1 /CU MM (ref 0–5)

## 2023-03-31 PROCEDURE — 25000003 PHARM REV CODE 250: Mod: HCNC | Performed by: INTERNAL MEDICINE

## 2023-03-31 PROCEDURE — 25000003 PHARM REV CODE 250: Mod: HCNC | Performed by: NURSE PRACTITIONER

## 2023-03-31 PROCEDURE — 88108 CYTOPATH CONCENTRATE TECH: CPT | Mod: HCNC | Performed by: PATHOLOGY

## 2023-03-31 PROCEDURE — 96450 PR CHEMOTHER,CNS,W/LUMBAR PUNCTURE: ICD-10-PCS | Mod: 52,HCNC,, | Performed by: NURSE PRACTITIONER

## 2023-03-31 PROCEDURE — 99238 PR HOSPITAL DISCHARGE DAY,<30 MIN: ICD-10-PCS | Mod: HCNC,25,GC, | Performed by: INTERNAL MEDICINE

## 2023-03-31 PROCEDURE — 85025 COMPLETE CBC W/AUTO DIFF WBC: CPT | Mod: HCNC | Performed by: NURSE PRACTITIONER

## 2023-03-31 PROCEDURE — 83615 LACTATE (LD) (LDH) ENZYME: CPT | Mod: 91,HCNC | Performed by: NURSE PRACTITIONER

## 2023-03-31 PROCEDURE — 1111F PR DISCHARGE MEDS RECONCILED W/ CURRENT OUTPATIENT MED LIST: ICD-10-PCS | Mod: HCNC,CPTII,GC, | Performed by: INTERNAL MEDICINE

## 2023-03-31 PROCEDURE — 83615 LACTATE (LD) (LDH) ENZYME: CPT | Mod: HCNC | Performed by: NURSE PRACTITIONER

## 2023-03-31 PROCEDURE — 82945 GLUCOSE OTHER FLUID: CPT | Mod: HCNC | Performed by: NURSE PRACTITIONER

## 2023-03-31 PROCEDURE — 84550 ASSAY OF BLOOD/URIC ACID: CPT | Mod: HCNC | Performed by: NURSE PRACTITIONER

## 2023-03-31 PROCEDURE — 83735 ASSAY OF MAGNESIUM: CPT | Mod: HCNC | Performed by: NURSE PRACTITIONER

## 2023-03-31 PROCEDURE — A4216 STERILE WATER/SALINE, 10 ML: HCPCS | Mod: HCNC | Performed by: NURSE PRACTITIONER

## 2023-03-31 PROCEDURE — 88108 PR  CYTOPATH FLUIDS,CONCENTRATN,INTERP: ICD-10-PCS | Mod: 26,HCNC,, | Performed by: PATHOLOGY

## 2023-03-31 PROCEDURE — 1111F DSCHRG MED/CURRENT MED MERGE: CPT | Mod: HCNC,CPTII,GC, | Performed by: INTERNAL MEDICINE

## 2023-03-31 PROCEDURE — 96450 CHEMOTHERAPY INTO CNS: CPT | Mod: 52,HCNC,, | Performed by: NURSE PRACTITIONER

## 2023-03-31 PROCEDURE — 88108 CYTOPATH CONCENTRATE TECH: CPT | Mod: 26,HCNC,, | Performed by: PATHOLOGY

## 2023-03-31 PROCEDURE — 84100 ASSAY OF PHOSPHORUS: CPT | Mod: HCNC | Performed by: NURSE PRACTITIONER

## 2023-03-31 PROCEDURE — 84157 ASSAY OF PROTEIN OTHER: CPT | Mod: HCNC | Performed by: NURSE PRACTITIONER

## 2023-03-31 PROCEDURE — 89051 BODY FLUID CELL COUNT: CPT | Mod: HCNC | Performed by: NURSE PRACTITIONER

## 2023-03-31 PROCEDURE — 99238 HOSP IP/OBS DSCHRG MGMT 30/<: CPT | Mod: HCNC,25,GC, | Performed by: INTERNAL MEDICINE

## 2023-03-31 PROCEDURE — 80053 COMPREHEN METABOLIC PANEL: CPT | Mod: HCNC | Performed by: NURSE PRACTITIONER

## 2023-03-31 PROCEDURE — 63600175 PHARM REV CODE 636 W HCPCS: Mod: HCNC | Performed by: NURSE PRACTITIONER

## 2023-03-31 RX ORDER — SIMETHICONE 80 MG
80 TABLET,CHEWABLE ORAL
Qty: 120 TABLET | Refills: 2 | Status: SHIPPED | OUTPATIENT
Start: 2023-03-31 | End: 2023-09-05

## 2023-03-31 RX ORDER — ONDANSETRON HYDROCHLORIDE 8 MG/1
8 TABLET, FILM COATED ORAL EVERY 8 HOURS PRN
Qty: 30 TABLET | Refills: 2 | Status: SHIPPED | OUTPATIENT
Start: 2023-03-31 | End: 2023-09-05

## 2023-03-31 RX ORDER — LIDOCAINE HYDROCHLORIDE 10 MG/ML
4 INJECTION INFILTRATION; PERINEURAL ONCE
Status: COMPLETED | OUTPATIENT
Start: 2023-03-31 | End: 2023-03-31

## 2023-03-31 RX ORDER — PROMETHAZINE HYDROCHLORIDE 12.5 MG/1
25 TABLET ORAL EVERY 6 HOURS PRN
Qty: 30 TABLET | Refills: 2 | Status: SHIPPED | OUTPATIENT
Start: 2023-03-31 | End: 2023-09-05

## 2023-03-31 RX ADMIN — SODIUM CHLORIDE, PRESERVATIVE FREE: 5 INJECTION INTRAVENOUS at 03:03

## 2023-03-31 RX ADMIN — SIMETHICONE 80 MG: 80 TABLET, CHEWABLE ORAL at 10:03

## 2023-03-31 RX ADMIN — ACYCLOVIR 400 MG: 200 CAPSULE ORAL at 08:03

## 2023-03-31 RX ADMIN — PANTOPRAZOLE SODIUM 40 MG: 40 TABLET, DELAYED RELEASE ORAL at 08:03

## 2023-03-31 RX ADMIN — LISINOPRIL 40 MG: 20 TABLET ORAL at 08:03

## 2023-03-31 RX ADMIN — LIDOCAINE HYDROCHLORIDE 4 ML: 10 INJECTION, SOLUTION INFILTRATION; PERINEURAL at 03:03

## 2023-03-31 RX ADMIN — Medication 400 MG: at 06:03

## 2023-03-31 RX ADMIN — ALLOPURINOL 300 MG: 300 TABLET ORAL at 08:03

## 2023-03-31 RX ADMIN — AZELASTINE 137 MCG: 1 SPRAY, METERED NASAL at 08:03

## 2023-03-31 NOTE — PHYSICIAN QUERY
PT Name: Jeremy Martinez  MR #: [id number]    DOCUMENTATION CLARIFICATION     CDS: Love Samaniego RN        Contact information:[email or web address] or (cell) [phone]      This form is a permanent document in the medical record.     Query Date: March 31, 2023    By submitting this query, we are merely seeking further clarification of documentation.. Please utilize your independent clinical judgment when addressing the question(s) below.    The medical record contains the following:   Indicators  Supporting Clinical Findings Location in Medical Record   x Energy Intake <50% of estimated energy requirement for > 3 months   Nutrition consult 3/30   x Weight Loss 19% x 6 months    Nutrition consult 3/30   x Fat Loss severe depletion of orbitals, triceps, and thoracic and lumbar region    Nutrition consult 3/30   x Muscle Loss moderate and severe depletion of temples, clavicle region, scapular region, interosseous muscle, and lower extremities    Nutrition consult 3/30    Edema/Fluid Accumulation      Reduced  Strength (by dynamometer)     x Weight, BMI, Usual Body Weight Weight (lb): 128.09 lb  Ideal Body Weight (IBW), Male: 142 lb  % Ideal Body Weight, Male (lb): 91.44 %  BMI (Calculated): 20.7   Nutrition consult 3/30    Delayed Wound Healing     x Registered Dietician Diagnosis Moderate/Severe Protein-Calorie Malnutrition  ecreased ability to consume sufficient energy/protein   Nutrition consult 3/30   x Acute or Chronic Illness Diffuse large B-cell lymphoma of lymph nodes of multiple regions   Nutrition consult 3/30    Social or Environmental Circumstances     x Treatment 1. Continue Dysphagia Soft (IDDSI Level 6) Diet.   2. Add Optisource VHP (RD ordered) with all meals to optimize calorie/protein intake.   3. Recommend Daily Weights.   4. Recommend psyllium husk for constipation.    Nutrition consult 3/30   x Other : RD consulted for ~30 (19%) x 6 months, which is significant. Pt meets criteria for  malnutrition. Hx of dysphagia (mass in throat makes it hard to swallow). N/C noted, LBM 3/30 (small). No V/D. UBW ~158lbs. PO intake ~75% at meals. At home patient drink Boost original 1x/day. NFPE complete 3/30, Moderate/severe fat/muscle wasting noted   Nutrition consult 3/30     Academy of Nutrition and Dietetics (Academy) and the American Society for Parenteral and Enteral Nutrition (A.S.P.E.N.) Clinical Characteristics to support Malnutrition   Malnutrition in the Context of Acute Illness or Injury Malnutrition in the Context of Chronic Illness or Injury Malnutrition in the Context of Social or Environmental Circumstances   Malnutrition Level Moderate Severe Moderate Severe   Moderate   Severe   Energy Intake <75%                   >7 days <50%                 >5 days <75%           >1 month <75%                      >1 month   <75% for >3 months   <50% for >1 month   Weight Loss   1-2% in 1 week >2% in 1 week 5% in 1 month >5% in 1 month 5% in 1 month >5% in 1 month    5% in 1 month >5% in 1 month 7.5% in 3 months >7.5% in 3 months 7.5% in 3 months >7.5% in 3 months    7.5% in 3 months >7.5% in 3 months 10% in 6 months >10% in 6 months 10% in 6 months >10% in 6 months        20% in 1 year                    >20% in 1 year                                                                  20% in 1 year                            >20% in 1 year                                                  Subcutaneous Fat Loss Mild  Moderate  Mild  Severe    Mild   Severe   Muscle Loss Mild  Moderate  Mild  Severe    Mild   Severe   Edema/Fluid Accumulation Mild Moderate to severe  Mild  Severe   Mild   Severe   Reduced  Strength         (based on standards supplied by  of dynamometer) N/A Measurably reduced N/A Measurably reduced N/A Measurably reduced     Criteria for mild malnutrition is defined as 1 characteristic outlined above within the established moderate or severe parameters.  A minimum of 2 out of  the 6 characteristics noted above are recommended for a diagnosis of moderate or severe malnutrition.  Chronic illness/injury is a disease/condition lasting 3 months or longer.    The noted clinical guidelines are only system guidelines and do not replace the providers clinical judgment.    Provider, please specify diagnosis or diagnoses associated with above clinical findings.    [x  ] Moderate Malnutrition - a minimum of 2 of the 6 moderate malnutrition characteristics noted above      [  ] Severe Malnutrition - a minimum of 2 of the 6 severe malnutrition characteristics noted above      [  ] Other Nutritional Diagnosis (please specify): _______     [  ] Malnutrition ruled out       Please document in your progress notes daily for the duration of treatment until resolved and  include in your discharge summary.      References:    MATHEUS Rock, & DENVER Bone (2022, April). Assessment and management of anorexia and cachexia in palliative care. Retrieved May 23, 2022, from [email or web address]-[email or web address]-management-[email or web address]-[email or web address]-[email or web address]-[email or web address]-care?[email or web address]_link     ROSAMARIA Yancey, PhD, RD, Kunal JAVED P., PhD, RN, CECILIA Canas MD, PhD, Kim FANG A., MS, RD, [facility], MIKIE Ribeiro, MS, RD, The Academy Malnutrition Work Group, The A.S.P.E.N. Board of Directors. (2012). Consensus Statement: Academy of Nutrition and Dietetics and American Society for Parenteral and Enteral Nutrition: Characteristics Recommended for the Identification and Documentation of Adult Malnutrition (Undernutrition). Journal of Parenteral and Enteral Nutrition, 36(3), 275-283. doi:10.1177/2398666160481145     Form No. [id number]

## 2023-03-31 NOTE — NURSING
Patient came back to 8th Floor status post LP and IT Chemo. He verbalized he's ready to go home. All discharge education has been implemented, patient and spouse verbalized understanding.

## 2023-03-31 NOTE — DISCHARGE SUMMARY
[facility] - Oncology ([facility])  Hematology  Bone Marrow Transplant  Discharge Summary      Patient Name: Jeremy Martinez  MRN: [id number]  Admission Date: 3/29/2023  Hospital Length of Stay: 2 days  Discharge Date and Time:  03/31/2023   Attending Physician: Lorraine Guerrero MD   Discharging Provider: Lorraine Aguilar NP  Primary Care Provider: Jose Abbott MD    HPI: Mr. Martinez is a 67 yo M, patient of Dr. Sarkar, with newly diagnosed bulky DLBCL (extensive stomach and GI involvement). Other PMHx includes GERD, HLD, HTN. He originally presented to an OSH with epigastric pain in February 2023. He was found to have an esophageal mass. He underwent endoscopy and biopsy of GI mass on 2/20/23 which revealed DLBCL with Ki-67 99%, CD30 neg, CD20 positive. PET scan 3/23 revealed a diffuse lymphadenopathy above/below diaphragm with a mediastinal SUV 28, an infiltrative stomach mass/stomach thickening with SUV 42, and a large wanda conglomerate in his mid abdomen with SUV 36. Decision was made to proceed with R-CHOP therapy. Patient will only receive CHOP for cycle 1 (holding rituxan due to risk of GI perf). He will start Rituxan with C2 and receive with subsequent cycles. Right port placed prior to admission. ECHO with EF 60% and grade I diastolic dysfunction.     On admission for initiation of CHOP/TLS monitoring, he endorses weight loss of 30 pounds over 6 months. He has difficulty swallowing due to the mass and has been on a soft diet and taking morphine solution for pain. Will consult nutrition/dietician on admission. Continue GI soft diet. Due to bulky disease, will monitor TLS labs Q8 hours and increase allopurinol to BID. Can reduce frequency once no evidence of TLS. He denies fevers, chills, chest pain, sob. He has occasional nausea controlled with PRN antiemetics. He also reports constipation and gas in which he takes MOM for. COVID negative prior to admission. Dr. Sarkar to sign chemotherapy and is okay to proceed  without repeat labs this evening    Hospital Course: Patient received C1 CHOP chemo on 3/29/23 around 2130. He tolerated it well without issue. No evidence of TLS on labs this admission. He will receive an LP with IT chemo this afternoon then discharge home. Followup as below. All questions answered. Patient agreeable to discharge.     Dispo:   - 4/01 Udenyca   - 4/19-4/20 labs, f/u appointment, C2 CHOP+Rituxan   - 4/21 Udenyca     - Patient knows to take Prednisone for a total of 5 days each cycle. Will take 2 more days worth at home.   - Ppx acyclovir, allopurinol   - Nausea ppx: Zofran, Phenergan Rx sent   - GI ppx: Home omeprazole; simethicone Rx sent     * Diffuse large B-cell lymphoma of lymph nodes of multiple regions  - Patient of Dr. Sarkar    - Originally presented to an OSH with epigastric pain in February 2023. Found to have an esophageal mass.   - 2/20/23 biopsy of GI mass revealed DLBCL with Ki-67 99%, CD30 neg, CD20 positive.   - 3/23/23 PET revealed a diffuse lymphadenopathy above/below diaphragm with a mediastinal SUV 28, an infiltrative stomach mass/stomach thickening with SUV 42, and a large wanda conglomerate in his mid abdomen with SUV 36.  - per Dr. Sarkar, marcia to proceed without bone marrow biopsy   - Port placed 3/17/23   - 3/08/23: ECHO with EF 60% and grade I diastolic dysfunction; tricuspid regurg   - Hep B / HIV negative   - Hep C negative  - TLS ppx: allopurinol daily and IVF   - TLS monitoring: daily    - Received CHOP on 3/29. Today is Day 2. Patient will only receive CHOP this cycle (due to GI perf risk); Rituxan will start with subsequent cycles with C2. Udenyca scheduled for Saturday 4/1   - Note Cycle 1's Rituxan will NOT be added on at the end of treatment (will only receive 5 total doses of rituxan)  - Ppx: acyclovir  - LP with IT chemo scheduled for 3/31; per Dr. Sarkar he will need it with at least first 4 cycles    Esophageal mass  - See DLBCL     Epigastric abdominal pain  -  Continue PRN morphine solution Q6 hours    Gas pain  - TID simethicone while inpatient; Rx sent at discharge     Other constipation  - PRN MOM available (has at home)     GERD (gastroesophageal reflux disease)  - Home omeprazole non formulary, convert to Protonix while inpatient     Hyperlipidemia  - Not currently taking any medication     Hypertension  - Continue home lisinopril daily    Physical Exam      General: Alert and oriented. No acute distress.    Eye: Extraocular movements are intact. No discharge.    HENT: Normocephalic. Oral mucosa is moist.    Respiratory: Lungs are clear to auscultation. Respirations are non-labored. Breath sounds are equal. On room air.   Cardiovascular:  Normal rate. Regular rhythm. No edema.    Gastrointestinal: Soft. Non-tender. Non-distended. Normal bowel sounds.    Integumentary: Warm. Dry. No rash. Right chest port dressing clean, dry, intact. No erythema, edema, or exudate.   Neurologic: Alert. Oriented.    Cognition and Speech: Oriented. Speech clear and coherent.       Psychiatric: Cooperative. Appropriate mood & affect.      Goals of Care Treatment Preferences:  Code Status: Full Code      Consults (From admission, onward)        Status Ordering Provider     Inpatient consult to Registered Dietitian/Nutritionist  Once        Provider:  (Not yet assigned)    Completed MALORIE SIMPSON          Significant Diagnostic Studies: Labs:   CMP   Recent Labs   Lab 03/30/23  0522 03/30/23  0751 03/31/23  0349   * 132* 132*   K 4.4 4.2 4.2   CL 98 98 102   CO2 22* 21* 21*    104 116*   BUN 13 14 15   CREATININE 0.8 0.8 0.9   CALCIUM 8.3* 8.7 8.3*   PROT 5.4* 5.8* 5.0*   ALBUMIN 2.3* 2.5* 2.2*   BILITOT 0.4 0.4 0.2   ALKPHOS 47* 48* 43*   AST 18 19 18   ALT 8* <5* 7*   ANIONGAP 9 13 9    and CBC   Recent Labs   Lab 03/30/23  0344 03/31/23  0349   WBC 4.24 3.72*   HGB 8.5* 7.6*   HCT 26.9* 23.7*    205       Pending Diagnostic Studies:     Procedure Component Value  Units Date/Time    FL Chemo Administration Intrathecal With LP, HEMONC Injected [[id number]]     Order Status: Sent Lab Status: No result         Final Active Diagnoses:    Diagnosis Date Noted POA    PRINCIPAL PROBLEM:  Diffuse large B-cell lymphoma of lymph nodes of multiple regions [C83.38] 03/08/2023 Yes    Esophageal mass [K22.89] 02/19/2023 Yes    Epigastric abdominal pain [R10.13] 03/11/2023 Yes    Other constipation [K59.09] 03/29/2023 Yes    Gas pain [R14.1] 03/29/2023 Yes    GERD (gastroesophageal reflux disease) [K21.9]  Yes     Chronic    Hypertension [I10]  Yes     Chronic    Hyperlipidemia [E78.5]  Yes     Chronic      Problems Resolved During this Admission:      Discharged Condition: stable    Disposition: Home or Self Care    Follow Up:    Patient Instructions:      CBC Auto Differential   Standing Status: Standing Number of Occurrences: 2 Standing Exp. Date: 05/28/24     Comprehensive Metabolic Panel   Standing Status: Standing Number of Occurrences: 2 Standing Exp. Date: 03/30/24     Magnesium   Standing Status: Standing Number of Occurrences: 2 Standing Exp. Date: 05/28/24     Phosphorus   Standing Status: Standing Number of Occurrences: 2 Standing Exp. Date: 05/28/24     Uric Acid   Standing Status: Standing Number of Occurrences: 2 Standing Exp. Date: 05/28/24     Lactate Dehydrogenase   Standing Status: Standing Number of Occurrences: 2 Standing Exp. Date: 05/28/24     Diet Dysphagia Soft     Notify your health care provider if you experience any of the following:  temperature >100.4     Notify your health care provider if you experience any of the following:  persistent nausea and vomiting or diarrhea     Notify your health care provider if you experience any of the following:  severe uncontrolled pain     Notify your health care provider if you experience any of the following:  redness, tenderness, or signs of infection (pain, swelling, redness, odor or green/yellow discharge around  incision site)     Type & Screen   Standing Status: Standing Number of Occurrences: 2 Standing Exp. Date: 24     Activity as tolerated     Medications:  Reconciled Home Medications:      Medication List      START taking these medications    simethicone 80 MG chewable tablet  Commonly known as: MYLICON  Chew and swallow 1 tablet (80 mg total) by mouth 2 hours after meals and at bedtime.        CHANGE how you take these medications    LORTAB ELIXIR  mg/15 mL Soln  Generic drug: HYDROcodone-acetaminophen  Take 15 mLs by mouth every 4 (four) hours as needed (pain).  What changed: Another medication with the same name was removed. Continue taking this medication, and follow the directions you see here.     ondansetron 8 MG tablet  Commonly known as: ZOFRAN  Take 1 tablet (8 mg total) by mouth every 8 (eight) hours as needed for Nausea.  What changed: when to take this        CONTINUE taking these medications    acyclovir 400 MG tablet  Commonly known as: ZOVIRAX  Take 1 tablet (400 mg total) by mouth 2 (two) times daily.     allopurinoL 100 MG tablet  Commonly known as: ZYLOPRIM  Take 3 tablets (300 mg total) by mouth once daily.     azelastine 137 mcg (0.1 %) nasal spray  Commonly known as: ASTELIN  1 spray (137 mcg total) by Nasal route 2 (two) times daily.     KENALOG 40 mg/mL injection  Generic drug: triamcinolone acetonide     LIDOcaine-prilocaine cream  Commonly known as: EMLA  Apply topically as needed.     lisinopriL 40 MG tablet  Commonly known as: PRINIVIL,ZESTRIL  Take 1 tablet (40 mg total) by mouth once daily.     magnesium hydroxide 400 mg/5 ml 400 mg/5 mL Susp  Commonly known as: MILK OF MAGNESIA  Take 30 mLs (2,400 mg total) by mouth daily as needed.     morphine 10 mg/5 mL solution  SMARTSI.5 Milliliter(s) By Mouth Every 6 Hours PRN     omeprazole 40 MG capsule  Commonly known as: PRILOSEC  Take 1 capsule (40 mg total) by mouth 2 (two) times daily before meals.     predniSONE 50 MG  Tab  Commonly known as: DELTASONE  Take 2 tablets (100 mg total) by mouth As instructed. Take 100 mg daily in the morning for 5 days once every 21 days     promethazine 12.5 MG Tab  Commonly known as: PHENERGAN  Take 2 tablets (25 mg total) by mouth every 6 (six) hours as needed (Severe Nausea).     sildenafiL 100 MG tablet  Commonly known as: VIAGRA  Take 1 tablet (100 mg total) by mouth as needed for Erectile Dysfunction.        STOP taking these medications    acetaminophen 325 MG tablet  Commonly known as: TYLENOL            Lorraine Aguilar NP  Bone Marrow Transplant  [facility] - Oncology ([facility])

## 2023-03-31 NOTE — PLAN OF CARE
CHW met with patient/family at bedside. Patient experience rounding completed and reviewed the following.     Do you know your discharge plan? Yes,    If yes, what is the plan? Home    If you are discharging home, do you have help at home? Yes, spouse    Do you think you will need help at home at discharge? Yes    Have you discussed your needs and preferences with your SW/CM? Yes    Assigned SW/CM notified of any patient/family needs or concerns.

## 2023-03-31 NOTE — PROGRESS NOTES
Patient seen at Fluoroscopy. LP done per radiology. CSF studies sent. Timeout done. Chemo checked with MD. Methotrexate 12 mg and Hydrocortisone 50 mg given intrathecally without difficulty.    Lorraine Aguilar NP  Hematology/Oncology/Bone Marrow Transplant

## 2023-03-31 NOTE — PROCEDURES
Radiology Post-Procedure Note    Pre Op Diagnosis: DLBCL    Post Op Diagnosis: Same    Procedure: lumbar puncture    Procedure performed by: Ilaina Sheldon MD    Supervising Physician: Ulices Rubio MD    Written Informed Consent Obtained: Yes    Specimen Removed: 15 mL CSF    Estimated Blood Loss: Minimal    Findings:   Following written informed consent and sterile prep and drape, a 22 gauge spinal needle was inserted at L4 - L5 intralaminar space under fluoroscopic surveillance.  15 mL clear CSF removed and sent to the lab for further analysis.  A member of the oncology team was present for the administration of intrathecal chemotherapy.    Opening pressure: 16 cmH2O    There were no complications.  Patient tolerated procedure well.    Iliana Sheldon MD PGYIII  Interventional Radiology  [facility]

## 2023-03-31 NOTE — H&P
Inpatient Radiology Pre-procedure Note    History of Present Illness:  Jeremy Martinez is a 68 y.o. male who presents for LP with intrathecal chermotherapy.  Admission H&P reviewed.  Past Medical History:   Diagnosis Date    Erectile dysfunction     GERD (gastroesophageal reflux disease) 2010    Hyperlipidemia 2008    Hypertension 2007     Past Surgical History:   Procedure Laterality Date    COLONOSCOPY N/A 8/1/2017    Procedure: COLONOSCOPY;  Surgeon: AMBER Restrepo MD;  Location: [facility] ENDO (4TH FLR);  Service: Endoscopy;  Laterality: N/A;    ENDOSCOPIC ULTRASOUND OF UPPER GASTROINTESTINAL TRACT N/A 2/20/2023    Procedure: ULTRASOUND, UPPER GI TRACT, ENDOSCOPIC;  Surgeon: Ady Cornell MD;  Location: [facility] ENDO (2ND FLR);  Service: Endoscopy;  Laterality: N/A;    ESOPHAGOGASTRODUODENOSCOPY N/A 2/20/2023    Procedure: EGD (ESOPHAGOGASTRODUODENOSCOPY);  Surgeon: Ady Cornell MD;  Location: [facility] ENDO (2ND FLR);  Service: Endoscopy;  Laterality: N/A;    INSERTION OF TUNNELED CENTRAL VENOUS CATHETER (CVC) WITH SUBCUTANEOUS PORT Right 3/17/2023    Procedure: INSERTION, PORT-A-CATH;  Surgeon: Will Corrales II, MD;  Location: [facility] CATH LAB;  Service: Interventional Radiology;  Laterality: Right;    TOTAL KNEE ARTHROPLASTY  2010    right       Review of Systems:   As documented in primary team H&P    Home Meds:   Prior to Admission medications    Medication Sig Start Date End Date Taking? Authorizing Provider   acyclovir (ZOVIRAX) 400 MG tablet Take 1 tablet (400 mg total) by mouth 2 (two) times daily. 3/24/23 3/23/24 Yes Nicole Dia NP   allopurinoL (ZYLOPRIM) 100 MG tablet Take 3 tablets (300 mg total) by mouth once daily. 3/8/23  Yes Lele Sarkar MD   azelastine (ASTELIN) 137 mcg (0.1 %) nasal spray 1 spray (137 mcg total) by Nasal route 2 (two) times daily. 3/3/23 3/2/24 Yes KACEY Cervantes   lisinopriL (PRINIVIL,ZESTRIL) 40 MG tablet Take 1 tablet (40 mg total) by mouth once daily. 10/3/22   Yes Jose Abbott MD   magnesium hydroxide 400 mg/5 ml (MILK OF MAGNESIA) 400 mg/5 mL Susp Take 30 mLs (2,400 mg total) by mouth daily as needed. 3/24/23  Yes Nicole Dia NP   morphine 10 mg/5 mL solution SMARTSI.5 Milliliter(s) By Mouth Every 6 Hours PRN 3/13/23  Yes Historical Provider   omeprazole (PRILOSEC) 40 MG capsule Take 1 capsule (40 mg total) by mouth 2 (two) times daily before meals. 3/3/23 3/2/24 Yes KACEY Cervantes   hydrocodone-acetaminophen (HYCET) solution 7.5-325 mg/15mL Take 15 mLs by mouth every 6 (six) hours as needed for Pain. 3/13/23 3/31/23 Yes Lele Sarkar MD   ondansetron (ZOFRAN) 8 MG tablet Take 1 tablet (8 mg total) by mouth every 12 (twelve) hours as needed for Nausea. 3/8/23 3/31/23 Yes Lele Sarkar MD   HYDROcodone-acetaminophen (LORTAB ELIXIR)  mg/15 mL Soln Take 15 mLs by mouth every 4 (four) hours as needed (pain). 3/11/23   Ling Keith NP   KENALOG 40 mg/mL injection  10/12/22   Historical Provider   LIDOcaine-prilocaine (EMLA) cream Apply topically as needed. 3/24/23   Nicole Dia NP   ondansetron (ZOFRAN) 8 MG tablet Take 1 tablet (8 mg total) by mouth every 8 (eight) hours as needed for Nausea. 3/31/23 3/30/24  Lorraine Aguilar NP   predniSONE (DELTASONE) 50 MG Tab Take 2 tablets (100 mg total) by mouth As instructed. Take 100 mg daily in the morning for 5 days once every 21 days 3/8/23   Lele Sarkar MD   promethazine (PHENERGAN) 12.5 MG Tab Take 2 tablets (25 mg total) by mouth every 6 (six) hours as needed (Severe Nausea). 3/31/23   Lorraine Aguilar NP   sildenafiL (VIAGRA) 100 MG tablet Take 1 tablet (100 mg total) by mouth as needed for Erectile Dysfunction. 22   Jose Abbott MD   simethicone (MYLICON) 80 MG chewable tablet Chew and swallow 1 tablet (80 mg total) by mouth 2 hours after meals and at bedtime. 3/31/23   Lorraine Aguilar NP   acetaminophen (TYLENOL) 325 MG tablet Take 325 mg by mouth every 6 (six) hours as  needed for Pain.  3/31/23  Historical Provider   promethazine (PHENERGAN) 12.5 MG Tab Take 2 tablets (25 mg total) by mouth every 6 (six) hours as needed (Severe Nausea). 3/3/23 3/31/23  Jayashree Alonzo, KACEY     Scheduled Meds:    acyclovir  400 mg Oral BID    allopurinoL  300 mg Oral Daily    azelastine  1 spray Nasal BID    lisinopriL  40 mg Oral Daily    methotrexate (RHEUMATREX) INTRATHECAL chemo injection   Intrathecal 1 time in Clinic/HOD    pantoprazole  40 mg Oral Daily    predniSONE  100 mg Oral Q24H    simethicone  1 tablet Oral QID (PC + HS)    sodium chloride 0.9% flush bag IVPB   Intravenous 1 time in Clinic/HOD     Continuous Infusions:    sodium chloride 0.9% 100 mL/hr at 03/31/23 0620     PRN Meds:alteplase, dextrose 10%, dextrose 10%, dextrose, dextrose, glucagon (human recombinant), heparin, porcine (PF), k phos di & mono-sod phos mono, k phos di & mono-sod phos mono, magnesium hydroxide 400 mg/5 ml, magnesium oxide, magnesium oxide, magnesium oxide, morphine, naloxone, potassium chloride, potassium chloride, potassium chloride, prochlorperazine, promethazine, sodium chloride 0.9%  Anticoagulants/Antiplatelets: no anticoagulation    Allergies:   Review of patient's allergies indicates:   Allergen Reactions    Amoxicillin Hives and Rash     Sedation Hx: have not been any systemic reactions    Labs:  No results for input(s): INR in the last 168 hours.    Invalid input(s):  PT,  PTT    Recent Labs   Lab 03/31/23  0349   WBC 3.72*   HGB 7.6*   HCT 23.7*   MCV 72*         Recent Labs   Lab 03/31/23  0349   *   *   K 4.2      CO2 21*   BUN 15   CREATININE 0.9   CALCIUM 8.3*   MG 1.7   ALT 7*   AST 18   ALBUMIN 2.2*   BILITOT 0.2         Vitals:  Temp: 97.5 °F (36.4 °C) (03/31/23 1122)  Pulse: 72 (03/31/23 1122)  Resp: 18 (03/31/23 1122)  BP: 106/63 (03/31/23 1122)  SpO2: 96 % (03/31/23 1122)     Physical Exam:  ASA: 3  Mallampati: 2    General: no acute distress  Mental  Status: alert and oriented to person, place and time  HEENT: normocephalic, atraumatic  Chest: unlabored breathing  Heart: regular heart rate  Abdomen: nondistended  Extremity: moves all extremities    Plan: LP with intrathecal chemotherapy  Sedation Plan: local only    Iliana Sheldon MD PGYIII  Interventional Radiology  [facility]

## 2023-04-01 ENCOUNTER — INFUSION (OUTPATIENT)
Dept: INFUSION THERAPY | Facility: HOSPITAL | Age: 68
End: 2023-04-01
Payer: MEDICARE

## 2023-04-01 DIAGNOSIS — C83.38 DIFFUSE LARGE B-CELL LYMPHOMA OF LYMPH NODES OF MULTIPLE REGIONS: Primary | ICD-10-CM

## 2023-04-01 PROCEDURE — 63600175 PHARM REV CODE 636 W HCPCS: Mod: JZ,JG,HCNC | Performed by: INTERNAL MEDICINE

## 2023-04-01 PROCEDURE — 96372 THER/PROPH/DIAG INJ SC/IM: CPT | Mod: HCNC

## 2023-04-01 RX ADMIN — PEGFILGRASTIM-CBQV 6 MG: 6 INJECTION, SOLUTION SUBCUTANEOUS at 09:04

## 2023-04-01 NOTE — NURSING
0940  Patient seated in chair accompanied by wife. Assessment done.  Udenyca subq injection to abdominal area administered, tolerated well.  Patient ambulated off floor accompanied  by wife.

## 2023-04-03 LAB — LACTATE DEHYDROGENASE FLUID: 17 U/L

## 2023-04-04 LAB
FINAL PATHOLOGIC DIAGNOSIS: NORMAL
Lab: NORMAL

## 2023-04-05 ENCOUNTER — OFFICE VISIT (OUTPATIENT)
Dept: HEMATOLOGY/ONCOLOGY | Facility: CLINIC | Age: 68
End: 2023-04-05
Payer: MEDICARE

## 2023-04-05 ENCOUNTER — HOSPITAL ENCOUNTER (OUTPATIENT)
Dept: RADIOLOGY | Facility: HOSPITAL | Age: 68
Discharge: HOME | End: 2023-04-05
Attending: NURSE PRACTITIONER
Payer: MEDICARE

## 2023-04-05 ENCOUNTER — TELEPHONE (OUTPATIENT)
Dept: HEMATOLOGY/ONCOLOGY | Facility: CLINIC | Age: 68
End: 2023-04-05
Payer: MEDICARE

## 2023-04-05 VITALS
RESPIRATION RATE: 16 BRPM | HEIGHT: 66 IN | OXYGEN SATURATION: 98 % | SYSTOLIC BLOOD PRESSURE: 130 MMHG | HEART RATE: 85 BPM | BODY MASS INDEX: 21.38 KG/M2 | DIASTOLIC BLOOD PRESSURE: 85 MMHG | WEIGHT: 133.06 LBS

## 2023-04-05 DIAGNOSIS — E78.5 HYPERLIPIDEMIA, UNSPECIFIED HYPERLIPIDEMIA TYPE: ICD-10-CM

## 2023-04-05 DIAGNOSIS — R10.9 ABDOMINAL PAIN, UNSPECIFIED ABDOMINAL LOCATION: ICD-10-CM

## 2023-04-05 DIAGNOSIS — K59.09 OTHER CONSTIPATION: Primary | ICD-10-CM

## 2023-04-05 DIAGNOSIS — K21.9 GASTROESOPHAGEAL REFLUX DISEASE, UNSPECIFIED WHETHER ESOPHAGITIS PRESENT: ICD-10-CM

## 2023-04-05 DIAGNOSIS — R53.82 CHRONIC FATIGUE: ICD-10-CM

## 2023-04-05 DIAGNOSIS — T45.1X5A CINV (CHEMOTHERAPY-INDUCED NAUSEA AND VOMITING): ICD-10-CM

## 2023-04-05 DIAGNOSIS — R11.2 CINV (CHEMOTHERAPY-INDUCED NAUSEA AND VOMITING): ICD-10-CM

## 2023-04-05 DIAGNOSIS — Q99.8 OTHER SPECIFIED CHROMOSOME ABNORMALITIES: ICD-10-CM

## 2023-04-05 DIAGNOSIS — I10 PRIMARY HYPERTENSION: ICD-10-CM

## 2023-04-05 DIAGNOSIS — G89.3 CANCER ASSOCIATED PAIN: ICD-10-CM

## 2023-04-05 DIAGNOSIS — C83.38 DIFFUSE LARGE B-CELL LYMPHOMA OF LYMPH NODES OF MULTIPLE REGIONS: ICD-10-CM

## 2023-04-05 DIAGNOSIS — K22.89 ESOPHAGEAL MASS: ICD-10-CM

## 2023-04-05 DIAGNOSIS — R63.4 WEIGHT LOSS: ICD-10-CM

## 2023-04-05 PROCEDURE — 1159F PR MEDICATION LIST DOCUMENTED IN MEDICAL RECORD: ICD-10-PCS | Mod: HCNC,CPTII,S$GLB, | Performed by: NURSE PRACTITIONER

## 2023-04-05 PROCEDURE — 99999 PR PBB SHADOW E&M-EST. PATIENT-LVL IV: CPT | Mod: PBBFAC,HCNC,, | Performed by: NURSE PRACTITIONER

## 2023-04-05 PROCEDURE — 99215 OFFICE O/P EST HI 40 MIN: CPT | Mod: HCNC,S$GLB,, | Performed by: NURSE PRACTITIONER

## 2023-04-05 PROCEDURE — 1160F RVW MEDS BY RX/DR IN RCRD: CPT | Mod: HCNC,CPTII,S$GLB, | Performed by: NURSE PRACTITIONER

## 2023-04-05 PROCEDURE — 3075F PR MOST RECENT SYSTOLIC BLOOD PRESS GE 130-139MM HG: ICD-10-PCS | Mod: HCNC,CPTII,S$GLB, | Performed by: NURSE PRACTITIONER

## 2023-04-05 PROCEDURE — 3075F SYST BP GE 130 - 139MM HG: CPT | Mod: HCNC,CPTII,S$GLB, | Performed by: NURSE PRACTITIONER

## 2023-04-05 PROCEDURE — 99499 UNLISTED E&M SERVICE: CPT | Mod: HCNC,S$GLB,, | Performed by: NURSE PRACTITIONER

## 2023-04-05 PROCEDURE — 1111F PR DISCHARGE MEDS RECONCILED W/ CURRENT OUTPATIENT MED LIST: ICD-10-PCS | Mod: HCNC,CPTII,S$GLB, | Performed by: NURSE PRACTITIONER

## 2023-04-05 PROCEDURE — 1160F PR REVIEW ALL MEDS BY PRESCRIBER/CLIN PHARMACIST DOCUMENTED: ICD-10-PCS | Mod: HCNC,CPTII,S$GLB, | Performed by: NURSE PRACTITIONER

## 2023-04-05 PROCEDURE — 3079F PR MOST RECENT DIASTOLIC BLOOD PRESSURE 80-89 MM HG: ICD-10-PCS | Mod: HCNC,CPTII,S$GLB, | Performed by: NURSE PRACTITIONER

## 2023-04-05 PROCEDURE — 3008F BODY MASS INDEX DOCD: CPT | Mod: HCNC,CPTII,S$GLB, | Performed by: NURSE PRACTITIONER

## 2023-04-05 PROCEDURE — 3079F DIAST BP 80-89 MM HG: CPT | Mod: HCNC,CPTII,S$GLB, | Performed by: NURSE PRACTITIONER

## 2023-04-05 PROCEDURE — 4010F ACE/ARB THERAPY RXD/TAKEN: CPT | Mod: HCNC,CPTII,S$GLB, | Performed by: NURSE PRACTITIONER

## 2023-04-05 PROCEDURE — 1125F PR PAIN SEVERITY QUANTIFIED, PAIN PRESENT: ICD-10-PCS | Mod: HCNC,CPTII,S$GLB, | Performed by: NURSE PRACTITIONER

## 2023-04-05 PROCEDURE — 99215 PR OFFICE/OUTPT VISIT, EST, LEVL V, 40-54 MIN: ICD-10-PCS | Mod: HCNC,S$GLB,, | Performed by: NURSE PRACTITIONER

## 2023-04-05 PROCEDURE — 1125F AMNT PAIN NOTED PAIN PRSNT: CPT | Mod: HCNC,CPTII,S$GLB, | Performed by: NURSE PRACTITIONER

## 2023-04-05 PROCEDURE — 99999 PR PBB SHADOW E&M-EST. PATIENT-LVL IV: ICD-10-PCS | Mod: PBBFAC,HCNC,, | Performed by: NURSE PRACTITIONER

## 2023-04-05 PROCEDURE — 74019 RADEX ABDOMEN 2 VIEWS: CPT | Mod: 26,HCNC,, | Performed by: RADIOLOGY

## 2023-04-05 PROCEDURE — 4010F PR ACE/ARB THEARPY RXD/TAKEN: ICD-10-PCS | Mod: HCNC,CPTII,S$GLB, | Performed by: NURSE PRACTITIONER

## 2023-04-05 PROCEDURE — 74019 XR ABDOMEN FLAT AND ERECT: ICD-10-PCS | Mod: 26,HCNC,, | Performed by: RADIOLOGY

## 2023-04-05 PROCEDURE — 1159F MED LIST DOCD IN RCRD: CPT | Mod: HCNC,CPTII,S$GLB, | Performed by: NURSE PRACTITIONER

## 2023-04-05 PROCEDURE — 1111F DSCHRG MED/CURRENT MED MERGE: CPT | Mod: HCNC,CPTII,S$GLB, | Performed by: NURSE PRACTITIONER

## 2023-04-05 PROCEDURE — 3008F PR BODY MASS INDEX (BMI) DOCUMENTED: ICD-10-PCS | Mod: HCNC,CPTII,S$GLB, | Performed by: NURSE PRACTITIONER

## 2023-04-05 PROCEDURE — 74019 RADEX ABDOMEN 2 VIEWS: CPT | Mod: TC,HCNC

## 2023-04-05 PROCEDURE — 99499 RISK ADDL DX/OHS AUDIT: ICD-10-PCS | Mod: HCNC,S$GLB,, | Performed by: NURSE PRACTITIONER

## 2023-04-05 NOTE — TELEPHONE ENCOUNTER
"----- Message from Charli Ruiz sent at 4/5/2023 10:21 AM CDT -----  Consult/Advisory:          Name Of Caller: Hui Martinez (Spouse)       Contact Preference?: [phone] (Mobile)      Provider Name: Ifeoma      Does patient feel the need to be seen today? No      What is the nature of the call?: Calling to speak w/ Nicole about post-chemo symptoms. Stating pt is currently experiencing issues w/ his stomach and back          Additional Notes:  "Thank you for all that you do for our patients"      "

## 2023-04-05 NOTE — PROGRESS NOTES
Urgent Care Oncology Visit    Date and Time: 04/05/2023 10:36 PM   Patient MRN: [id number]   Chief Complaint: No chief complaint on file.    Reason for Urgent Care Visit: pain  Intervention: education provided, imaging ordered, labs ordered, and prescriptions sent  Dispo: return to clinic as previous  UrgOnc appointment addressed via: phone call, [facility] message, and dialoguing with RN  This UrgOnc visit was in person  Time spent handling UC issue: 45 minute  Was this virtual or in person UrgOnc visit appropriate? yes     HPI: , 68, M, is here for hematology consultation for newly diagnosed diffuse large B cell lymphoma. He has GERD, HLD, HTn. He presented to outside hospital in February 2023 with epigastric pain.  He was found to have an esophageal mass and was [place] [facility] [place] He underwent endoscopy with biopsy of GI mass . He remained stable on liquid diet.     He has lost ~30 lbs in the past 6 months. Denies fevers, night sweats, upper/lower GI bleeding. He have chronic constipation.  Patient received cycle 1 day 1 of RCHOP at inpatient side on 03/26/23.     Interval History:  Patient seen at clinic for an urgent care follow up due to c/o acute abdominal pain. Last BM was on 04/02/23. Denied nausea, vomiting,bleeding. Per patient positive for gas passing, +ve for BS during assessment.        Past Medical History:   Diagnosis Date    Erectile dysfunction     GERD (gastroesophageal reflux disease) 2010    Hyperlipidemia 2008    Hypertension 2007               Past Surgical History:   Procedure Laterality Date    COLONOSCOPY N/A 8/1/2017    Procedure: COLONOSCOPY;  Surgeon: AMBER Restrepo MD;  Location: [facility] (4TH FLR);  Service: Endoscopy;  Laterality: N/A;    ENDOSCOPIC ULTRASOUND OF UPPER GASTROINTESTINAL TRACT N/A 2/20/2023    Procedure: ULTRASOUND, UPPER GI TRACT, ENDOSCOPIC;  Surgeon: Ady Cornell MD;  Location: [facility] (2ND FLR);  Service: Endoscopy;  Laterality: N/A;     ESOPHAGOGASTRODUODENOSCOPY N/A 2023    Procedure: EGD (ESOPHAGOGASTRODUODENOSCOPY);  Surgeon: Ady Cornell MD;  Location: [address]);  Service: Endoscopy;  Laterality: N/A;    INSERTION OF TUNNELED CENTRAL VENOUS CATHETER (CVC) WITH SUBCUTANEOUS PORT Right 3/17/2023    Procedure: INSERTION, PORT-A-CATH;  Surgeon: Will Corrales II, MD;  Location: [facility] CATH LAB;  Service: Interventional Radiology;  Laterality: Right;    TOTAL KNEE ARTHROPLASTY  2010    right         Family History   Problem Relation Age of Onset    Diabetes Mother     Stroke Father     Kidney disease Son         transplant    Cancer Neg Hx     Colon cancer Neg Hx     Colon polyps Neg Hx     Esophageal cancer Neg Hx     Stomach cancer Neg Hx     Rectal cancer Neg Hx     Ulcerative colitis Neg Hx     Crohn's disease Neg Hx        Social History     Socioeconomic History    Marital status:    Tobacco Use    Smoking status: Former     Years: 10.00     Types: Cigarettes     Quit date: 2000     Years since quittin.7    Smokeless tobacco: Never   Substance and Sexual Activity    Alcohol use: Yes     Comment: beers 5 d per week, 3 at a time    Drug use: No   Social History Narrative     (in gen healthy), 5 kids. 4 grandkids. 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 No formal exercise but active. 1 son living with him.       Review of patient's allergies indicates:   Allergen Reactions    Amoxicillin Hives and Rash       Current Outpatient Medications   Medication Sig    acyclovir (ZOVIRAX) 400 MG tablet Take 1 tablet (400 mg total) by mouth 2 (two) times daily.    allopurinoL (ZYLOPRIM) 100 MG tablet Take 3 tablets (300 mg total) by mouth once daily.    azelastine (ASTELIN) 137 mcg (0.1 %) nasal spray 1 spray (137 mcg total) by Nasal route 2 (two) times daily.    HYDROcodone-acetaminophen (LORTAB ELIXIR)  mg/15 mL Soln Take 15 mLs by mouth every 4 (four) hours as needed (pain).    KENALOG 40 mg/mL injection      LIDOcaine-prilocaine (EMLA) cream Apply topically as needed.    lisinopriL (PRINIVIL,ZESTRIL) 40 MG tablet Take 1 tablet (40 mg total) by mouth once daily.    magnesium hydroxide 400 mg/5 ml (MILK OF MAGNESIA) 400 mg/5 mL Susp Take 30 mLs (2,400 mg total) by mouth daily as needed.    morphine 10 mg/5 mL solution SMARTSI.5 Milliliter(s) By Mouth Every 6 Hours PRN    omeprazole (PRILOSEC) 40 MG capsule Take 1 capsule (40 mg total) by mouth 2 (two) times daily before meals.    ondansetron (ZOFRAN) 8 MG tablet Take 1 tablet (8 mg total) by mouth every 8 (eight) hours as needed for Nausea.    predniSONE (DELTASONE) 50 MG Tab Take 2 tablets (100 mg total) by mouth As instructed. Take 100 mg daily in the morning for 5 days once every 21 days    promethazine (PHENERGAN) 12.5 MG Tab Take 2 tablets (25 mg total) by mouth every 6 (six) hours as needed (Severe Nausea).    sildenafiL (VIAGRA) 100 MG tablet Take 1 tablet (100 mg total) by mouth as needed for Erectile Dysfunction.    simethicone (MYLICON) 80 MG chewable tablet Chew and swallow 1 tablet (80 mg total) by mouth 2 hours after meals and at bedtime.     No current facility-administered medications for this visit.          Review of Systems   Constitutional:  Positive for malaise/fatigue and weight loss. Negative for chills, diaphoresis and fever.   HENT:  Negative for congestion, ear pain, hearing loss, nosebleeds, sinus pain and tinnitus.    Eyes:  Negative for blurred vision, double vision, photophobia, pain and discharge.   Respiratory:  Negative for cough, hemoptysis and sputum production.    Cardiovascular:  Negative for palpitations, orthopnea, claudication and leg swelling.   Gastrointestinal:  Positive for abdominal pain, constipation, heartburn and nausea. Negative for blood in stool, diarrhea, melena and vomiting.   Genitourinary:  Negative for dysuria, frequency, hematuria and urgency.   Musculoskeletal:  Negative for back pain, myalgias and neck pain.    Neurological:  Negative for tingling, tremors, focal weakness, weakness and headaches.   Endo/Heme/Allergies:  Negative for environmental allergies.   Psychiatric/Behavioral:  Negative for depression, hallucinations and substance abuse. The patient is not nervous/anxious and does not have insomnia.           Vitals:    04/05/23 1306   BP: 130/85   Pulse: 85   Resp: 16       PS: ECOG 2    Physical Exam  Constitutional:       Appearance: Normal appearance.   HENT:      Head: Normocephalic and atraumatic.      Mouth/Throat:      Pharynx: No posterior oropharyngeal erythema.   Eyes:      General: No scleral icterus.  Cardiovascular:      Rate and Rhythm: Normal rate and regular rhythm.      Heart sounds: No murmur heard.  Pulmonary:      Effort: Pulmonary effort is normal. No respiratory distress.      Breath sounds: Normal breath sounds. No rhonchi.   Abdominal:      General: Bowel sounds are normal. There is distension.      Palpations: There is no mass.      Tenderness: There is abdominal tenderness. There is guarding.   Musculoskeletal:         General: No swelling.   Lymphadenopathy:      Cervical: No cervical adenopathy.   Skin:     Coloration: Skin is not jaundiced.   Neurological:      General: No focal deficit present.      Mental Status: He is alert and oriented to person, place, and time.      Cranial Nerves: No cranial nerve deficit.        2/18/23 CT abdomen pelvis with contrast    COMPARISON:  None     FINDINGS:  Abdomen:     - Lung bases: There is focal emphysematous change in the medial left lung base.  Lung bases contain mild areas of peripheral chronic atelectasis.  A focus of suspected atelectasis simulates a nodular opacity at the right dome of the diaphragm.     There is distal esophageal distension with fluid secondary to and infiltrative type mass involving the visualized cardiac portion of the stomach extending to the GE junction concerning for malignancy.  There is a mantle of lobular  adenopathy surrounding the proximal abdominal aorta appearing to involve the retrocrural lymph nodes and adjacent periaortic and pericaval lymph nodes to the level of the left renal vein with the renal a vein somewhat displaced.  No visible renal vein invasion/thrombosis.  Smaller shotty type lymph nodes are seen caudal to the renal veins in the retroperitoneum.     - Liver: The liver appears homogeneous and not significantly enlarged.     - Gallbladder: There is no CT evidence of cholecystitis or cholelithiasis.     - Bile Ducts: No evidence of intra or extra hepatic biliary ductal dilation.     - Spleen: Negative.     - Kidneys: The kidneys enhance symmetrically and homogeneously.     - Adrenals: Unremarkable.     - Pancreas: Pancreas is displaced anteriorly by the retroperitoneal adenopathy with an ill-defined mass around 3 cm in diameter in the body appearing contiguous with the pancreatic parenchyma and similar in density to the retroperitoneal adenopathy consistent with involvement/invasion.  Additionally there is no fat plane margin between the posterior pancreatic body and the lobular retroperitoneal mantle of adenopathy.  Heterogeneous enhancement of the body of the pancreas in this region and poor visualization of the splenic artery and vein within the body suggests vessel encasement/involvement.  Partial thrombosis of portions of these vessels is suspected.     -Vascular: No abdominal aortic aneurysm or significant atherosclerosis.     - Abdominal wall:  There is a small right inguinal hernia.     Small bowel and colon: There is no small bowel distension.  There is no mesenteric convincing adenopathy.  Shotty lymph nodes are noted.  No extraluminal free air or free fluid is seen in the abdomen or pelvis.  Appendix is not isolated as a separate structure however there is no evidence of appendicitis.  Few scattered diverticuli are seen distally in the colon.  No diverticulitis.     Pelvis:     There is no  pelvic mass, adenopathy, or free fluid.  Bladder is unremarkable noting mild thickening of the mucosa can be explained by under distension.  The prostate is borderline mildly prominent.     Bones:  No acute osseous abnormality and no suspicious lytic or blastic lesion.     Impression:     Bulky infiltrative type mass compatible with malignancy involving the gastric cardiac portion extending to the GE junction with esophageal high-grade obstruction .  Further evaluation can be made with CT chest.     Bulky adenopathy in the retroperitoneum in the periaortic and pericaval region to the level of the left renal vein also appearing to involve the dorsal pancreatic body and also appearing to involve and obstruct the splenic artery and vein as described.     Incidental additional nonacute findings are discussed in the body of the report.       2/20/23 upper GI EUS     --One extrinsic moderate stenosis was found at the gastroesophageal junction. The stenosis was traversed.      --  A large, fungating and infiltrative mass with oozing bleeding was found in the cardia, in the gastric fundus and in the gastric body.        --Biopsies were taken with a cold forceps for histology.        --The examined duodenum was normal.          ENDOSONOGRAPHIC FINDING:          -- The esophagus and adjacent structures were visualized endosonographically.      --  A hypoechoic mass was identified endosonographically in the cardia of the stomach and in the fundus of the stomach (seen from the GE  junction, as EUS scope could not traverse the area of extrinsic        stenosis). The mass measured 52 mm by 62 mm in maximal cross-sectional diameter.     Impression:            - Extrinsic narrowing of the esophagus.                          - Gastric tumor in the cardia, in the gastric fundus and in the gastric body. Biopsied.                          - Normal examined duodenum.         1. GASTRIC MASS BIOPSY:            -  Diffuse large B-cell  lymphoma, non-germinal center subtype          -  High proliferation index (99% by Ki-67 immunostain)          -  Negative for CD30 co-expression by immunostain          -  TREY JANA positive for EBV-encoded small mRNAs in  few scattered   bystander lymphocytes          -   PENDING  (send-out): MYC tech only immunostain and Double/Triple Hit Lymphoma FISH panel with results to be reported in a separate supplemental and final diagnosis may be further classified             at that time   COMMENT: No flow cytometric analysis was performed on this specimen    Low and high power examination reveal minute irregular fragments of gastric tissue with an atypical diffuse submucosal infiltrate with focal crush artifacts and composed predominantly of large lymphoma cells with occasional   prominent central nucleoli and focal areas with increased scattered mature eosinophils and rarer scattered neutrophils, histiocytes, small mature lymphocytes, and plasma cells.   AE1/AE3, Synaptophysin, and Chromogranin immunostains were performed with adequate controls on block 1A by the referring surgical pathologist who   initially reviewed this biopsy and are negative for carcinoma or infiltrating neuroendocrine cells.   Immunohistochemical study with stains for CD3, CD5, BCL-2, CD20, CD23, Cyclin D1, CD10, BCL-6, MUM-1, CD30, and Ki-67 and EBV-encoded small mRNAs in-situ hybridization (TREY JANA) were performed on block 1A with appropriate controls   for increased sensitivity and cellular localization within the cells of interest.  Unstained slides of block 1A were sent out to [facility]   Laboratories for MYC tech only immunostain with results to be interpreted at [facility] and Double/Triple Hit Lymphoma FISH panel.   The large lymphoma cells are positive for CD20 and MUM-1 (nuclear) and negative for AE1/AE3, Synaptophysin, Chromogranin, CD3, CD5, CD23, Cyclin D1   (nuclear), CD10 (dim <30%), BCL-6 (strong  nuclear <30%), and CD30. Ki-67 (nuclear) highlights a high proliferation index (99%). TREY JANA stains few scattered small and medium-sized bystander lymphocytes.   AE1/A3 stains mucosal and glandular epithelial cells. Synaptophysin and/or Chromogranin stain a rare subset of normal neuroendocrine cells associated   with the glandular epithelial cells.   CD3, CD5, and BCL-2 co-stain numerous scattered reactive T-cells within the focal non-involved areas of the biopsy. CD5 also appears to stain glandular   epithelial cells and a rare subset of the mucosal epithelial cells. Cyclin D1 (nuclear) stains epithelial and endothelial cells. CD10 stains stromal cells and few scattered neutrophils. Rare scattered medium-sized CD30+ cells (favor   reactive immunoblasts) are identified  Lab Results   Component Value Date    WBC 1.72 (LL) 04/05/2023    HGB 9.9 (L) 04/05/2023    HCT 30.3 (L) 04/05/2023    MCV 71 (L) 04/05/2023     04/05/2023       CMP  Sodium   Date Value Ref Range Status   04/05/2023 129 (L) 136 - 145 mmol/L Final     Potassium   Date Value Ref Range Status   04/05/2023 4.2 3.5 - 5.1 mmol/L Final     Chloride   Date Value Ref Range Status   04/05/2023 95 95 - 110 mmol/L Final     CO2   Date Value Ref Range Status   04/05/2023 26 23 - 29 mmol/L Final     Glucose   Date Value Ref Range Status   04/05/2023 101 70 - 110 mg/dL Final     BUN   Date Value Ref Range Status   04/05/2023 6 (L) 8 - 23 mg/dL Final     Creatinine   Date Value Ref Range Status   04/05/2023 0.7 0.5 - 1.4 mg/dL Final     Calcium   Date Value Ref Range Status   04/05/2023 8.6 (L) 8.7 - 10.5 mg/dL Final     Total Protein   Date Value Ref Range Status   04/05/2023 5.7 (L) 6.0 - 8.4 g/dL Final     Albumin   Date Value Ref Range Status   04/05/2023 2.8 (L) 3.5 - 5.2 g/dL Final     Total Bilirubin   Date Value Ref Range Status   04/05/2023 0.7 0.1 - 1.0 mg/dL Final     Comment:     For infants and newborns, interpretation of results should be  based  on gestational age, weight and in agreement with clinical  observations.    Premature Infant recommended reference ranges:  Up to 24 hours.............<8.0 mg/dL  Up to 48 hours............<12.0 mg/dL  3-5 days..................<15.0 mg/dL  6-29 days.................<15.0 mg/dL       Alkaline Phosphatase   Date Value Ref Range Status   04/05/2023 53 (L) 55 - 135 U/L Final     AST   Date Value Ref Range Status   04/05/2023 10 10 - 40 U/L Final     ALT   Date Value Ref Range Status   04/05/2023 7 (L) 10 - 44 U/L Final     Anion Gap   Date Value Ref Range Status   04/05/2023 8 8 - 16 mmol/L Final     eGFR   Date Value Ref Range Status   04/05/2023 >60.0 >60 mL/min/1.73 m^2 Final     1. Other constipation        2. Diffuse large B-cell lymphoma of lymph nodes of multiple regions  Rapid BMT CBC with Diff    Comprehensive Metabolic Panel    Lactate Dehydrogenase      3. Abdominal pain, unspecified abdominal location  X-Ray Abdomen Flat And Erect      4. Chronic fatigue        5. Primary hypertension        6. Hyperlipidemia, unspecified hyperlipidemia type        7. Gastroesophageal reflux disease, unspecified whether esophagitis present        8. CINV (chemotherapy-induced nausea and vomiting)        9. 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 Weight loss        12. Cancer associated pain                  Assessment:    1. DLBCL, non-GC type of multiple sites  2. DLBCL of stomach  3. Fatigue  4. Microcytic anemia  5. Htn, primary  6. HLD  7.GERD  8. Cancer associated pain  9. Constipation  10. Weight loss  11. Malnutrition      Plan:  Urgent care for abdominal pain  - Positive for acute abdominal  pain, taking Morphine one dose every other day  - Last BM on 04/02/23  - Order for abdominal x ray  - Patient to take miralax, he has it at home.   - Patient to report to ER for any acute events.     1,2: I discussed the diagnosis, prognosis of diffuse large B cell lymphoma. I  explained that the treatment is with multi-agent chemotherapy. Staging with PETCT, BM biopsy.  FISH pending. Ki 67 high. HBV, HIV, G6PD unremarkable.  He will start allopurinol 300mg daily to prevent tumor lysis syndrome.  He scheduled for ECHO on 03/28/23, PET CT (03/23/23)- hypermetabolic lymphadenopathy above and below the diaphragm, consistent with extranodal disease.   port placed . He started chemotherapy CHOP + IT MTX on 03/26/23.  He will receive RCHOP on 04/19   Receiving prophy acyclovir  4. TIBC low, ferritin normal,. Saturated iron 12% on 2/19/23. He will have hemoglobin electrophoresis checked.   Treatment is with curative intent.       5,6: He follows with     7. He is on omeprazole BID    8. He will take morphine 15mg liquid by mouth as needed, q 6hour. He is aware of the risks associated with morphine, including drowsiness, risk of falls, and constipation. He uis aware he cannot drive if he is using morphine.  He is using Morphine only every other day    9. He has poor oral intake. He has bowel sounds on examination. Using PRN miralax, colace. Acute constipation as above, he will use MOM.     10, 11. Secondary to poor oral intake. He is on liquid diet. Encouraged use of ENSURE three times daily.             BMT Chart Routing      Follow up with physician . Keep scheduled lab, visit, chemo on 04/19   Follow up with BRIAN    Provider visit type    Infusion scheduling note    Injection scheduling note    Labs    Imaging    Pharmacy appointment    Other referrals         Advance Care Planning     Date: 04/05/2023        We previously had discussions regarding his underlying diagnosis, natural history, prognosis, and treatment options.  He was interested in pursuing any and all treatment options available to him, including chemo treatment.  He remains interested in pursuing all treatment needed.  Will continue to proceed with current treatment. Will follow up closely during treatment.   Total time  of this visit was 30 minutes, including time spent face to face with patient and also in preparing for today's visit for MDM and documentation. (Medical Decision Making, including consideration of possible diagnoses, management options, complex medical record review, review of diagnostic tests and information, consideration and discussion of significant complications based on comorbidities, and discussion with providers involved with the care of the patient). Greater than 50% was spent face to face with the patient counseling and coordinating care.      Nicole Dia NP  Hematology/Oncology/BMT

## 2023-04-05 NOTE — TELEPHONE ENCOUNTER
"----- Message from Charli Ruiz sent at 4/5/2023 10:21 AM CDT -----  Consult/Advisory:          Name Of Caller: Hui Martinez (Spouse)       Contact Preference?: [phone] (Mobile)      Provider Name: Ifeoma      Does patient feel the need to be seen today? No      What is the nature of the call?: Calling to speak w/ Nicole about post-chemo symptoms. Stating pt is currently experiencing issues w/ his stomach and back          Additional Notes:  "Thank you for all that you do for our patients"      "
Pt is coming in today for a 1pm appt and labs. Pts wife notified. Pts wife states she and the pt will be here for that time.  
- - -

## 2023-04-17 DIAGNOSIS — C83.38 DIFFUSE LARGE B-CELL LYMPHOMA OF LYMPH NODES OF MULTIPLE REGIONS: ICD-10-CM

## 2023-04-17 RX ORDER — ALLOPURINOL 100 MG/1
300 TABLET ORAL DAILY
Qty: 30 TABLET | Refills: 2 | Status: SHIPPED | OUTPATIENT
Start: 2023-04-17 | End: 2023-05-15

## 2023-04-17 NOTE — TELEPHONE ENCOUNTER
"----- Message from Yulissa Santacruz sent at 4/17/2023 11:03 AM CDT -----  Consult/Advisory:       Name Of Caller: Hui Martinez (Spouse)          Contact Preference?: [phone] (Mobile)       Provider Name: Antonina       Does patient feel the need to be seen today? No       What is the nature of the call?: Calling to speak w/ nurse in regards to the following meds allopurinoL (ZYLOPRIM) 100 MG tablet  to see if pt should still be taken med. If so needs refill          Additional Notes:  "Thank you for all that you do for our patients"                                           "

## 2023-04-17 NOTE — TELEPHONE ENCOUNTER
Left VM advising to continue taking allopurinol per MD. Dr. Sarkar to send in prescription refill today.

## 2023-04-19 ENCOUNTER — CLINICAL SUPPORT (OUTPATIENT)
Dept: HEMATOLOGY/ONCOLOGY | Facility: CLINIC | Age: 68
End: 2023-04-19
Payer: MEDICARE

## 2023-04-19 ENCOUNTER — INFUSION (OUTPATIENT)
Dept: INFUSION THERAPY | Facility: HOSPITAL | Age: 68
End: 2023-04-19
Attending: INTERNAL MEDICINE
Payer: MEDICARE

## 2023-04-19 ENCOUNTER — OFFICE VISIT (OUTPATIENT)
Dept: HEMATOLOGY/ONCOLOGY | Facility: CLINIC | Age: 68
End: 2023-04-19
Payer: MEDICARE

## 2023-04-19 VITALS
HEART RATE: 69 BPM | HEIGHT: 66 IN | TEMPERATURE: 98 F | RESPIRATION RATE: 20 BRPM | DIASTOLIC BLOOD PRESSURE: 76 MMHG | BODY MASS INDEX: 19.01 KG/M2 | WEIGHT: 118.25 LBS | OXYGEN SATURATION: 100 % | SYSTOLIC BLOOD PRESSURE: 123 MMHG

## 2023-04-19 DIAGNOSIS — E43 SEVERE MALNUTRITION: ICD-10-CM

## 2023-04-19 DIAGNOSIS — C83.30 DLBCL (DIFFUSE LARGE B CELL LYMPHOMA): Primary | ICD-10-CM

## 2023-04-19 DIAGNOSIS — C83.38 DIFFUSE LARGE B-CELL LYMPHOMA OF LYMPH NODES OF MULTIPLE REGIONS: ICD-10-CM

## 2023-04-19 DIAGNOSIS — E87.6 HYPOKALEMIA: ICD-10-CM

## 2023-04-19 DIAGNOSIS — R53.82 CHRONIC FATIGUE: ICD-10-CM

## 2023-04-19 DIAGNOSIS — C83.38 DIFFUSE LARGE B-CELL LYMPHOMA OF LYMPH NODES OF MULTIPLE REGIONS: Primary | ICD-10-CM

## 2023-04-19 DIAGNOSIS — Z71.3 NUTRITIONAL COUNSELING: ICD-10-CM

## 2023-04-19 DIAGNOSIS — D64.9 ANEMIA OF UNKNOWN ETIOLOGY: ICD-10-CM

## 2023-04-19 DIAGNOSIS — K22.89 ESOPHAGEAL MASS: ICD-10-CM

## 2023-04-19 DIAGNOSIS — K21.9 GASTROESOPHAGEAL REFLUX DISEASE, UNSPECIFIED WHETHER ESOPHAGITIS PRESENT: Chronic | ICD-10-CM

## 2023-04-19 DIAGNOSIS — E83.42 HYPOMAGNESEMIA: ICD-10-CM

## 2023-04-19 LAB
ABO + RH BLD: NORMAL
ALBUMIN SERPL BCP-MCNC: 2.9 G/DL (ref 3.5–5.2)
ALP SERPL-CCNC: 75 U/L (ref 55–135)
ALT SERPL W/O P-5'-P-CCNC: <5 U/L (ref 10–44)
ANION GAP SERPL CALC-SCNC: 7 MMOL/L (ref 8–16)
AST SERPL-CCNC: 10 U/L (ref 10–40)
BASOPHILS # BLD AUTO: 0.04 K/UL (ref 0–0.2)
BASOPHILS NFR BLD: 0.4 % (ref 0–1.9)
BILIRUB SERPL-MCNC: 0.5 MG/DL (ref 0.1–1)
BLD GP AB SCN CELLS X3 SERPL QL: NORMAL
BUN SERPL-MCNC: 5 MG/DL (ref 8–23)
CALCIUM SERPL-MCNC: 8.2 MG/DL (ref 8.7–10.5)
CHLORIDE SERPL-SCNC: 103 MMOL/L (ref 95–110)
CO2 SERPL-SCNC: 22 MMOL/L (ref 23–29)
CREAT SERPL-MCNC: 0.7 MG/DL (ref 0.5–1.4)
DIFFERENTIAL METHOD: ABNORMAL
EOSINOPHIL # BLD AUTO: 0 K/UL (ref 0–0.5)
EOSINOPHIL NFR BLD: 0 % (ref 0–8)
ERYTHROCYTE [DISTWIDTH] IN BLOOD BY AUTOMATED COUNT: 17.4 % (ref 11.5–14.5)
EST. GFR  (NO RACE VARIABLE): >60 ML/MIN/1.73 M^2
GLUCOSE SERPL-MCNC: 85 MG/DL (ref 70–110)
HCT VFR BLD AUTO: 23.7 % (ref 40–54)
HGB BLD-MCNC: 8.1 G/DL (ref 14–18)
IMM GRANULOCYTES # BLD AUTO: 0.43 K/UL (ref 0–0.04)
IMM GRANULOCYTES NFR BLD AUTO: 4.3 % (ref 0–0.5)
LDH SERPL L TO P-CCNC: 194 U/L (ref 110–260)
LYMPHOCYTES # BLD AUTO: 0.9 K/UL (ref 1–4.8)
LYMPHOCYTES NFR BLD: 8.7 % (ref 18–48)
MAGNESIUM SERPL-MCNC: 1.8 MG/DL (ref 1.6–2.6)
MCH RBC QN AUTO: 23.8 PG (ref 27–31)
MCHC RBC AUTO-ENTMCNC: 34.2 G/DL (ref 32–36)
MCV RBC AUTO: 70 FL (ref 82–98)
MONOCYTES # BLD AUTO: 1 K/UL (ref 0.3–1)
MONOCYTES NFR BLD: 10 % (ref 4–15)
NEUTROPHILS # BLD AUTO: 7.7 K/UL (ref 1.8–7.7)
NEUTROPHILS NFR BLD: 76.6 % (ref 38–73)
NRBC BLD-RTO: 0 /100 WBC
PHOSPHATE SERPL-MCNC: 2.3 MG/DL (ref 2.7–4.5)
PLATELET # BLD AUTO: 398 K/UL (ref 150–450)
PMV BLD AUTO: 9.6 FL (ref 9.2–12.9)
POTASSIUM SERPL-SCNC: 2.9 MMOL/L (ref 3.5–5.1)
PROT SERPL-MCNC: 5.8 G/DL (ref 6–8.4)
RBC # BLD AUTO: 3.4 M/UL (ref 4.6–6.2)
SODIUM SERPL-SCNC: 132 MMOL/L (ref 136–145)
SPECIMEN OUTDATE: NORMAL
URATE SERPL-MCNC: 1.7 MG/DL (ref 3.4–7)
WBC # BLD AUTO: 9.99 K/UL (ref 3.9–12.7)

## 2023-04-19 PROCEDURE — 97802 MEDICAL NUTRITION INDIV IN: CPT | Mod: HCNC,S$GLB,, | Performed by: DIETITIAN, REGISTERED

## 2023-04-19 PROCEDURE — 99214 OFFICE O/P EST MOD 30 MIN: CPT | Mod: HCNC,S$GLB,, | Performed by: INTERNAL MEDICINE

## 2023-04-19 PROCEDURE — 1101F PR PT FALLS ASSESS DOC 0-1 FALLS W/OUT INJ PAST YR: ICD-10-PCS | Mod: HCNC,CPTII,S$GLB, | Performed by: INTERNAL MEDICINE

## 2023-04-19 PROCEDURE — 3008F BODY MASS INDEX DOCD: CPT | Mod: HCNC,CPTII,S$GLB, | Performed by: INTERNAL MEDICINE

## 2023-04-19 PROCEDURE — 1111F PR DISCHARGE MEDS RECONCILED W/ CURRENT OUTPATIENT MED LIST: ICD-10-PCS | Mod: HCNC,CPTII,S$GLB, | Performed by: INTERNAL MEDICINE

## 2023-04-19 PROCEDURE — 3074F PR MOST RECENT SYSTOLIC BLOOD PRESSURE < 130 MM HG: ICD-10-PCS | Mod: HCNC,CPTII,S$GLB, | Performed by: INTERNAL MEDICINE

## 2023-04-19 PROCEDURE — 3288F PR FALLS RISK ASSESSMENT DOCUMENTED: ICD-10-PCS | Mod: HCNC,CPTII,S$GLB, | Performed by: INTERNAL MEDICINE

## 2023-04-19 PROCEDURE — 1159F PR MEDICATION LIST DOCUMENTED IN MEDICAL RECORD: ICD-10-PCS | Mod: HCNC,CPTII,S$GLB, | Performed by: INTERNAL MEDICINE

## 2023-04-19 PROCEDURE — 83735 ASSAY OF MAGNESIUM: CPT | Mod: HCNC | Performed by: NURSE PRACTITIONER

## 2023-04-19 PROCEDURE — 84550 ASSAY OF BLOOD/URIC ACID: CPT | Mod: HCNC | Performed by: NURSE PRACTITIONER

## 2023-04-19 PROCEDURE — 84100 ASSAY OF PHOSPHORUS: CPT | Mod: HCNC | Performed by: NURSE PRACTITIONER

## 2023-04-19 PROCEDURE — 83615 LACTATE (LD) (LDH) ENZYME: CPT | Mod: HCNC | Performed by: NURSE PRACTITIONER

## 2023-04-19 PROCEDURE — 4010F ACE/ARB THERAPY RXD/TAKEN: CPT | Mod: HCNC,CPTII,S$GLB, | Performed by: INTERNAL MEDICINE

## 2023-04-19 PROCEDURE — 99999 PR PBB SHADOW E&M-EST. PATIENT-LVL III: ICD-10-PCS | Mod: PBBFAC,HCNC,, | Performed by: INTERNAL MEDICINE

## 2023-04-19 PROCEDURE — 86900 BLOOD TYPING SEROLOGIC ABO: CPT | Mod: HCNC | Performed by: NURSE PRACTITIONER

## 2023-04-19 PROCEDURE — 1159F MED LIST DOCD IN RCRD: CPT | Mod: HCNC,CPTII,S$GLB, | Performed by: INTERNAL MEDICINE

## 2023-04-19 PROCEDURE — 85025 COMPLETE CBC W/AUTO DIFF WBC: CPT | Mod: HCNC | Performed by: NURSE PRACTITIONER

## 2023-04-19 PROCEDURE — 3288F FALL RISK ASSESSMENT DOCD: CPT | Mod: HCNC,CPTII,S$GLB, | Performed by: INTERNAL MEDICINE

## 2023-04-19 PROCEDURE — 1101F PT FALLS ASSESS-DOCD LE1/YR: CPT | Mod: HCNC,CPTII,S$GLB, | Performed by: INTERNAL MEDICINE

## 2023-04-19 PROCEDURE — 97802 PR MED NUTR THER, 1ST, INDIV, EA 15 MIN: ICD-10-PCS | Mod: HCNC,S$GLB,, | Performed by: DIETITIAN, REGISTERED

## 2023-04-19 PROCEDURE — 99214 PR OFFICE/OUTPT VISIT, EST, LEVL IV, 30-39 MIN: ICD-10-PCS | Mod: HCNC,S$GLB,, | Performed by: INTERNAL MEDICINE

## 2023-04-19 PROCEDURE — 80053 COMPREHEN METABOLIC PANEL: CPT | Mod: HCNC | Performed by: NURSE PRACTITIONER

## 2023-04-19 PROCEDURE — 3078F PR MOST RECENT DIASTOLIC BLOOD PRESSURE < 80 MM HG: ICD-10-PCS | Mod: HCNC,CPTII,S$GLB, | Performed by: INTERNAL MEDICINE

## 2023-04-19 PROCEDURE — 1126F AMNT PAIN NOTED NONE PRSNT: CPT | Mod: HCNC,CPTII,S$GLB, | Performed by: INTERNAL MEDICINE

## 2023-04-19 PROCEDURE — 99999 PR PBB SHADOW E&M-EST. PATIENT-LVL III: CPT | Mod: PBBFAC,HCNC,, | Performed by: INTERNAL MEDICINE

## 2023-04-19 PROCEDURE — 25000003 PHARM REV CODE 250: Mod: HCNC | Performed by: INTERNAL MEDICINE

## 2023-04-19 PROCEDURE — 3074F SYST BP LT 130 MM HG: CPT | Mod: HCNC,CPTII,S$GLB, | Performed by: INTERNAL MEDICINE

## 2023-04-19 PROCEDURE — 1111F DSCHRG MED/CURRENT MED MERGE: CPT | Mod: HCNC,CPTII,S$GLB, | Performed by: INTERNAL MEDICINE

## 2023-04-19 PROCEDURE — 3008F PR BODY MASS INDEX (BMI) DOCUMENTED: ICD-10-PCS | Mod: HCNC,CPTII,S$GLB, | Performed by: INTERNAL MEDICINE

## 2023-04-19 PROCEDURE — 4010F PR ACE/ARB THEARPY RXD/TAKEN: ICD-10-PCS | Mod: HCNC,CPTII,S$GLB, | Performed by: INTERNAL MEDICINE

## 2023-04-19 PROCEDURE — 1126F PR PAIN SEVERITY QUANTIFIED, NO PAIN PRESENT: ICD-10-PCS | Mod: HCNC,CPTII,S$GLB, | Performed by: INTERNAL MEDICINE

## 2023-04-19 PROCEDURE — 3078F DIAST BP <80 MM HG: CPT | Mod: HCNC,CPTII,S$GLB, | Performed by: INTERNAL MEDICINE

## 2023-04-19 PROCEDURE — A4216 STERILE WATER/SALINE, 10 ML: HCPCS | Mod: HCNC | Performed by: INTERNAL MEDICINE

## 2023-04-19 RX ORDER — SODIUM CHLORIDE 0.9 % (FLUSH) 0.9 %
10 SYRINGE (ML) INJECTION
Status: CANCELLED | OUTPATIENT
Start: 2023-04-19

## 2023-04-19 RX ORDER — HEPARIN 100 UNIT/ML
500 SYRINGE INTRAVENOUS
Status: CANCELLED | OUTPATIENT
Start: 2023-04-19

## 2023-04-19 RX ORDER — POTASSIUM CHLORIDE 20 MEQ/1
20 TABLET, EXTENDED RELEASE ORAL 3 TIMES DAILY
Qty: 30 TABLET | Refills: 11 | Status: ON HOLD | OUTPATIENT
Start: 2023-04-19 | End: 2024-01-04

## 2023-04-19 RX ORDER — SODIUM CHLORIDE 0.9 % (FLUSH) 0.9 %
10 SYRINGE (ML) INJECTION
Status: DISCONTINUED | OUTPATIENT
Start: 2023-04-19 | End: 2023-04-19 | Stop reason: HOSPADM

## 2023-04-19 RX ORDER — DOXORUBICIN HYDROCHLORIDE 2 MG/ML
50 INJECTION, SOLUTION INTRAVENOUS
Status: CANCELLED | OUTPATIENT
Start: 2023-04-20

## 2023-04-19 RX ORDER — SODIUM CHLORIDE 0.9 % (FLUSH) 0.9 %
10 SYRINGE (ML) INJECTION
Status: CANCELLED | OUTPATIENT
Start: 2023-04-20

## 2023-04-19 RX ORDER — FAMOTIDINE 10 MG/ML
20 INJECTION INTRAVENOUS
Status: CANCELLED | OUTPATIENT
Start: 2023-04-20

## 2023-04-19 RX ORDER — HEPARIN 100 UNIT/ML
500 SYRINGE INTRAVENOUS
Status: CANCELLED | OUTPATIENT
Start: 2023-04-20

## 2023-04-19 RX ORDER — HEPARIN 100 UNIT/ML
500 SYRINGE INTRAVENOUS
Status: DISCONTINUED | OUTPATIENT
Start: 2023-04-19 | End: 2023-04-19 | Stop reason: HOSPADM

## 2023-04-19 RX ORDER — ACETAMINOPHEN 325 MG/1
650 TABLET ORAL
Status: CANCELLED | OUTPATIENT
Start: 2023-04-20

## 2023-04-19 RX ORDER — LANOLIN ALCOHOL/MO/W.PET/CERES
400 CREAM (GRAM) TOPICAL DAILY
Qty: 60 TABLET | Refills: 0 | Status: SHIPPED | OUTPATIENT
Start: 2023-04-19 | End: 2023-09-05

## 2023-04-19 RX ORDER — MEPERIDINE HYDROCHLORIDE 50 MG/ML
25 INJECTION INTRAMUSCULAR; INTRAVENOUS; SUBCUTANEOUS
Status: CANCELLED | OUTPATIENT
Start: 2023-04-20 | End: 2023-04-21

## 2023-04-19 RX ADMIN — Medication 10 ML: at 09:04

## 2023-04-19 NOTE — PROGRESS NOTES
CC: DLBCL, hematology follow up    HPI: , 68, M, is here for hematology follow up. He has diffuse large B cell lymphoma. He has GERD, HLD, HTn. He presented to outside hospital in 2023 with epigastric pain.  He was found to have an esophageal mass and was transferred to [facility] [place]. He underwent endoscopy with biopsy of GI mass . He remained stable on liquid diet.     He has lost ~30 lbs in the past 6 months. Denies fevers, night sweats, upper/lower GI bleeding.       Interval History: He is here for follow up prior to cycle 2. He feels better. Pain is improved. His constipation has resolved. Denies fever, chills, black stools, hematochezia, heron.   Review of patient's allergies indicates:   Allergen Reactions    Amoxicillin Hives and Rash       Current Outpatient Medications   Medication Sig    acyclovir (ZOVIRAX) 400 MG tablet Take 1 tablet (400 mg total) by mouth 2 (two) times daily.    allopurinoL (ZYLOPRIM) 100 MG tablet Take 3 tablets (300 mg total) by mouth once daily.    azelastine (ASTELIN) 137 mcg (0.1 %) nasal spray 1 spray (137 mcg total) by Nasal route 2 (two) times daily.    HYDROcodone-acetaminophen (LORTAB ELIXIR)  mg/15 mL Soln Take 15 mLs by mouth every 4 (four) hours as needed (pain).    KENALOG 40 mg/mL injection     LIDOcaine-prilocaine (EMLA) cream Apply topically as needed.    lisinopriL (PRINIVIL,ZESTRIL) 40 MG tablet Take 1 tablet (40 mg total) by mouth once daily.    magnesium hydroxide 400 mg/5 ml (MILK OF MAGNESIA) 400 mg/5 mL Susp Take 30 mLs (2,400 mg total) by mouth daily as needed.    morphine 10 mg/5 mL solution SMARTSI.5 Milliliter(s) By Mouth Every 6 Hours PRN    omeprazole (PRILOSEC) 40 MG capsule Take 1 capsule (40 mg total) by mouth 2 (two) times daily before meals.    ondansetron (ZOFRAN) 8 MG tablet Take 1 tablet (8 mg total) by mouth every 8 (eight) hours as needed for Nausea.    predniSONE (DELTASONE) 50 MG Tab Take 2 tablets (100 mg  total) by mouth As instructed. Take 100 mg daily in the morning for 5 days once every 21 days    promethazine (PHENERGAN) 12.5 MG Tab Take 2 tablets (25 mg total) by mouth every 6 (six) hours as needed (Severe Nausea).    sildenafiL (VIAGRA) 100 MG tablet Take 1 tablet (100 mg total) by mouth as needed for Erectile Dysfunction.    simethicone (MYLICON) 80 MG chewable tablet Chew and swallow 1 tablet (80 mg total) by mouth 2 hours after meals and at bedtime.    sildenafiL (VIAGRA) 100 MG tablet Take 1 tablet (100 mg total) by mouth as needed for Erectile Dysfunction.     No current facility-administered medications for this visit.     Facility-Administered Medications Ordered in Other Visits   Medication    heparin, porcine (PF) 100 unit/mL injection flush 500 Units    sodium chloride 0.9% flush 10 mL          Review of Systems   Constitutional:  Positive for malaise/fatigue and weight loss. Negative for chills, diaphoresis and fever.   HENT:  Negative for congestion, ear pain, hearing loss, nosebleeds, sinus pain and tinnitus.    Eyes:  Negative for blurred vision, double vision, photophobia, pain and discharge.   Respiratory:  Negative for cough, hemoptysis and sputum production.    Cardiovascular:  Negative for palpitations, orthopnea, claudication and leg swelling.   Gastrointestinal:  Positive for abdominal pain, constipation, heartburn and nausea. Negative for blood in stool, diarrhea, melena and vomiting.   Genitourinary:  Negative for dysuria, frequency, hematuria and urgency.   Musculoskeletal:  Negative for back pain, myalgias and neck pain.   Neurological:  Negative for tingling, tremors, focal weakness, weakness and headaches.   Endo/Heme/Allergies:  Negative for environmental allergies.   Psychiatric/Behavioral:  Negative for depression, hallucinations and substance abuse. The patient is not nervous/anxious and does not have insomnia.           Vitals:    04/19/23 1010   BP: 123/76   Pulse: 69   Resp: 20    Temp: 97.9 °F (36.6 °C)     PS: ECOG 2    Physical Exam  Constitutional:       Appearance: Normal appearance.   HENT:      Head: Normocephalic and atraumatic.      Mouth/Throat:      Pharynx: No posterior oropharyngeal erythema.   Eyes:      General: No scleral icterus.  Cardiovascular:      Rate and Rhythm: Normal rate and regular rhythm.      Heart sounds: No murmur heard.  Pulmonary:      Effort: Pulmonary effort is normal. No respiratory distress.      Breath sounds: Normal breath sounds. No rhonchi.   Abdominal:      General: There is no distension.      Palpations: There is no mass.      Tenderness: There is no abdominal tenderness.   Musculoskeletal:         General: No swelling.   Lymphadenopathy:      Cervical: No cervical adenopathy.   Skin:     Coloration: Skin is not jaundiced.   Neurological:      General: No focal deficit present.      Mental Status: He is alert and oriented to person, place, and time.      Cranial Nerves: No cranial nerve deficit.        2/18/23 CT abdomen pelvis with contrast    COMPARISON:  None     FINDINGS:  Abdomen:     - Lung bases: There is focal emphysematous change in the medial left lung base.  Lung bases contain mild areas of peripheral chronic atelectasis.  A focus of suspected atelectasis simulates a nodular opacity at the right dome of the diaphragm.     There is distal esophageal distension with fluid secondary to and infiltrative type mass involving the visualized cardiac portion of the stomach extending to the GE junction concerning for malignancy.  There is a mantle of lobular adenopathy surrounding the proximal abdominal aorta appearing to involve the retrocrural lymph nodes and adjacent periaortic and pericaval lymph nodes to the level of the left renal vein with the renal a vein somewhat displaced.  No visible renal vein invasion/thrombosis.  Smaller shotty type lymph nodes are seen caudal to the renal veins in the retroperitoneum.     - Liver: The liver appears  homogeneous and not significantly enlarged.     - Gallbladder: There is no CT evidence of cholecystitis or cholelithiasis.     - Bile Ducts: No evidence of intra or extra hepatic biliary ductal dilation.     - Spleen: Negative.     - Kidneys: The kidneys enhance symmetrically and homogeneously.     - Adrenals: Unremarkable.     - Pancreas: Pancreas is displaced anteriorly by the retroperitoneal adenopathy with an ill-defined mass around 3 cm in diameter in the body appearing contiguous with the pancreatic parenchyma and similar in density to the retroperitoneal adenopathy consistent with involvement/invasion.  Additionally there is no fat plane margin between the posterior pancreatic body and the lobular retroperitoneal mantle of adenopathy.  Heterogeneous enhancement of the body of the pancreas in this region and poor visualization of the splenic artery and vein within the body suggests vessel encasement/involvement.  Partial thrombosis of portions of these vessels is suspected.     -Vascular: No abdominal aortic aneurysm or significant atherosclerosis.     - Abdominal wall:  There is a small right inguinal hernia.     Small bowel and colon: There is no small bowel distension.  There is no mesenteric convincing adenopathy.  Shotty lymph nodes are noted.  No extraluminal free air or free fluid is seen in the abdomen or pelvis.  Appendix is not isolated as a separate structure however there is no evidence of appendicitis.  Few scattered diverticuli are seen distally in the colon.  No diverticulitis.     Pelvis:     There is no pelvic mass, adenopathy, or free fluid.  Bladder is unremarkable noting mild thickening of the mucosa can be explained by under distension.  The prostate is borderline mildly prominent.     Bones:  No acute osseous abnormality and no suspicious lytic or blastic lesion.     Impression:     Bulky infiltrative type mass compatible with malignancy involving the gastric cardiac portion extending to  the GE junction with esophageal high-grade obstruction .  Further evaluation can be made with CT chest.     Bulky adenopathy in the retroperitoneum in the periaortic and pericaval region to the level of the left renal vein also appearing to involve the dorsal pancreatic body and also appearing to involve and obstruct the splenic artery and vein as described.     Incidental additional nonacute findings are discussed in the body of the report.       2/20/23 upper GI EUS     --One extrinsic moderate stenosis was found at the gastroesophageal junction. The stenosis was traversed.      --  A large, fungating and infiltrative mass with oozing bleeding was found in the cardia, in the gastric fundus and in the gastric body.        --Biopsies were taken with a cold forceps for histology.        --The examined duodenum was normal.          ENDOSONOGRAPHIC FINDING:          -- The esophagus and adjacent structures were visualized endosonographically.      --  A hypoechoic mass was identified endosonographically in the cardia of the stomach and in the fundus of the stomach (seen from the GE  junction, as EUS scope could not traverse the area of extrinsic        stenosis). The mass measured 52 mm by 62 mm in maximal cross-sectional diameter.     Impression:            - Extrinsic narrowing of the esophagus.                          - Gastric tumor in the cardia, in the gastric fundus and in the gastric body. Biopsied.                          - Normal examined duodenum.         1. GASTRIC MASS BIOPSY:            -  Diffuse large B-cell lymphoma, non-germinal center subtype          -  High proliferation index (99% by Ki-67 immunostain)          -  Negative for CD30 co-expression by immunostain          -  TREY JANA positive for EBV-encoded small mRNAs in  few scattered   bystander lymphocytes          -   PENDING  (send-out): MYC tech only immunostain and Double/Triple Hit Lymphoma FISH panel with results to be reported in a  separate supplemental and final diagnosis may be further classified             at that time   COMMENT: No flow cytometric analysis was performed on this specimen    Low and high power examination reveal minute irregular fragments of gastric tissue with an atypical diffuse submucosal infiltrate with focal crush artifacts and composed predominantly of large lymphoma cells with occasional   prominent central nucleoli and focal areas with increased scattered mature eosinophils and rarer scattered neutrophils, histiocytes, small mature lymphocytes, and plasma cells.   AE1/AE3, Synaptophysin, and Chromogranin immunostains were performed with adequate controls on block 1A by the referring surgical pathologist who   initially reviewed this biopsy and are negative for carcinoma or infiltrating neuroendocrine cells.   Immunohistochemical study with stains for CD3, CD5, BCL-2, CD20, CD23, Cyclin D1, CD10, BCL-6, MUM-1, CD30, and Ki-67 and EBV-encoded small mRNAs in-situ hybridization (TREY JANA) were performed on block 1A with appropriate controls   for increased sensitivity and cellular localization within the cells of interest.  Unstained slides of block 1A were sent out to [facility]   Laboratories for MYC tech only immunostain with results to be interpreted at [facility] and Double/Triple Hit Lymphoma FISH panel.   The large lymphoma cells are positive for CD20 and MUM-1 (nuclear) and negative for AE1/AE3, Synaptophysin, Chromogranin, CD3, CD5, CD23, Cyclin D1   (nuclear), CD10 (dim <30%), BCL-6 (strong nuclear <30%), and CD30. Ki-67 (nuclear) highlights a high proliferation index (99%). TREY JANA stains few scattered small and medium-sized bystander lymphocytes.   AE1/A3 stains mucosal and glandular epithelial cells. Synaptophysin and/or Chromogranin stain a rare subset of normal neuroendocrine cells associated   with the glandular epithelial cells.   CD3, CD5, and BCL-2 co-stain numerous  scattered reactive T-cells within the focal non-involved areas of the biopsy. CD5 also appears to stain glandular   epithelial cells and a rare subset of the mucosal epithelial cells. Cyclin D1 (nuclear) stains epithelial and endothelial cells. CD10 stains stromal cells and few scattered neutrophils. Rare scattered medium-sized CD30+ cells (favor   reactive immunoblasts) are identified      3/8/23 hemoglobin electrophoresis: No electrophoretic evidence of abnormal hemoglobin or beta thalassemia. See comment    3/23/23 PET CT      COMPARISON:  CT abdomen pelvis 02/18/2023     FINDINGS:  Quality of the study: Adequate.     Reference values:     Mediastinal blood pool maximum SUV: 1.7     Normal liver background maximum SUV: 1.4     There is lymphadenopathy above below the diaphragm.     In the head and neck, there are no hypermetabolic lesions worrisome for malignancy. There are no hypermetabolic mucosal lesions, and there are no pathologically enlarged or hypermetabolic lymph nodes.     In the chest, there is mediastinal lymphadenopathy e.g. subcarinal node measuring 1.0 cm with SUV max 28 (image 70) and left upper paratracheal node measuring 0.6 cm with SUV max 12 (image 41).     Small left pleural effusion.  No concerning pulmonary nodules or masses.     In the abdomen and pelvis, there is marked diffuse stomach wall thickening with intense uptake, SUV max 42.  Large wanda conglomerate in the mid abdomen with SUV max 36 (axial fused image 125), difficult to delineate from surrounding bowel and vessels CT.  Additional scattered hypermetabolic lymphadenopathy involving retrocrural nodes, mesenteric nodes, and omental nodules.     There is physiologic tracer distribution within the abdominal organs and excretion into the genitourinary system including probable pooling of excreted tracer within the penile urethra.     The spleen has normal uptake and is nonenlarged at 5 cm in craniocaudal dimension.     In the bones,  there are no hypermetabolic lesions worrisome for malignancy.     In the extremities, there are no hypermetabolic lesions worrisome for malignancy.     Incidental CT findings: Right chest port with catheter tip in the right atrium.  Multi-vessel coronary calcifications.     Impression:     1.  In this patient with lymphoma, there is hypermetabolic lymphadenopathy above and below the diaphragm, well as an infiltrative stomach mass consistent with extranodal disease.  Significant lymphadenopathy below the diaphragm is difficult to delineate without intravenous contrast, and bulky disease is not excluded.       3/28/23 ECHO    Normal central venous pressure (3 mmHg).  The estimated PA systolic pressure is 40 mmHg.  The left ventricle is normal in size with normal systolic function.  The estimated ejection fraction is 60%.  Grade I left ventricular diastolic dysfunction.  Normal right ventricular size with normal right ventricular systolic function.  Mild to moderate tricuspid regurgitation.  Small pericardial effusion.      3/31/23 CSF cytology: Negative for malignant cells    Component      Latest Ref Rng & Units 4/19/2023   WBC      3.90 - 12.70 K/uL 9.99   RBC      4.60 - 6.20 M/uL 3.40 (L)   Hemoglobin      14.0 - 18.0 g/dL 8.1 (L)   Hematocrit      40.0 - 54.0 % 23.7 (L)   MCV      82 - 98 fL 70 (L)   MCH      27.0 - 31.0 pg 23.8 (L)   MCHC      32.0 - 36.0 g/dL 34.2   RDW      11.5 - 14.5 % 17.4 (H)   Platelets      150 - 450 K/uL 398   MPV      9.2 - 12.9 fL 9.6   Immature Granulocytes      0.0 - 0.5 % 4.3 (H)   Gran # (ANC)      1.8 - 7.7 K/uL 7.7   Immature Grans (Abs)      0.00 - 0.04 K/uL 0.43 (H)   Lymph #      1.0 - 4.8 K/uL 0.9 (L)   Mono #      0.3 - 1.0 K/uL 1.0   Eos #      0.0 - 0.5 K/uL 0.0   Baso #      0.00 - 0.20 K/uL 0.04   nRBC      0 /100 WBC 0   Gran %      38.0 - 73.0 % 76.6 (H)   Lymph %      18.0 - 48.0 % 8.7 (L)   Mono %      4.0 - 15.0 % 10.0   Eosinophil %      0.0 - 8.0 % 0.0   Basophil  %      0.0 - 1.9 % 0.4   Differential Method       Automated   Sodium      136 - 145 mmol/L 132 (L)   Potassium      3.5 - 5.1 mmol/L 2.9 (L)   Chloride      95 - 110 mmol/L 103   CO2      23 - 29 mmol/L 22 (L)   Glucose      70 - 110 mg/dL 85   BUN      8 - 23 mg/dL 5 (L)   Creatinine      0.5 - 1.4 mg/dL 0.7   Calcium      8.7 - 10.5 mg/dL 8.2 (L)   PROTEIN TOTAL      6.0 - 8.4 g/dL 5.8 (L)   Albumin      3.5 - 5.2 g/dL 2.9 (L)   BILIRUBIN TOTAL      0.1 - 1.0 mg/dL 0.5   Alkaline Phosphatase      55 - 135 U/L 75   AST      10 - 40 U/L 10   ALT      10 - 44 U/L <5 (L)   Anion Gap      8 - 16 mmol/L 7 (L)   eGFR      >60 mL/min/1.73 m:2 >60.0   Magnesium      1.6 - 2.6 mg/dL 1.8   Phosphorus      2.7 - 4.5 mg/dL 2.3 (L)   Uric Acid      3.4 - 7.0 mg/dL 1.7 (L)   LD      110 - 260 U/L 194       Assessment:    1. DLBCL, non-GC type of multiple sites  2. DLBCL of stomach  3. Fatigue  4. Microcytic anemia  5. Htn, primary  6. HLD  7.GERD  8. Cancer associated pain  9. Constipation  10. Weight loss  11. Malnutrition  12. Hypokalemia  13. hyponatremia      Plan:    1,2: I discussed the diagnosis, prognosis of diffuse large B cell lymphoma. I explained that the treatment is with multi-agent chemotherapy. Staging with PETCT, BM biopsy.  FISH pending. Ki 67 high. He will have CBC, CMP, LDH, uric acid, G6PD, HBV, HIV serology checked. He will start allopurinol 300mg daily to prevent tumor lysis syndrome.  PET CT  on 3/23/23 demonstrated hypermetabolic lymphadenopathy above and below the diaphragm, well as an infiltrative stomach mass consistent with extranodal disease. ECHO showed normal LVEF on 3/28/23.   NCCN IPI score 6, suggesting high risk disease, with estimated 5 year PFS of 30% and estimated 5 year OS of 33%.   Cycle 1 chemotherapy was in-patient, on 3/29/23. Rituximab was omitted from cycle 1 due to risk of GI perforation. IT MTX administered with cycle 1 CHOP as CNS IPI was intermediate/ high. No malignant cells  identified in CSF on 3/31/23.   He will receive cycle 2        4. TIBC low, ferritin normal,. Saturated iron 12% on 2/19/23.  hemoglobin electrophoresis showed normal pattern on 3/8/23.     5,6: He follows with     7. He is on omeprazole BID    8. He is on morphine 15mg liquid by mouth as needed, q 6hour. He is aware of the risks associated with morphine, including drowsiness, risk of falls, and constipation. He uis aware he cannot drive if he is using morphine.      9. He has poor oral intake. He has bowel sounds on examination. He will try miralax.     10, 11. Secondary to poor oral intake. He is on liquid diet. Encouraged use of ENSURE three times daily.       12. He will take oral potassium    13. Mild, encouraged increased hydration         BMT Chart Routing      Follow up with physician 3 weeks. pt needs intra thecal chemotherapt this week or next week; follow up in 3 weeks for chemo , neulasta, intra theecal chemo   Follow up with BRIAN    Provider visit type    Infusion scheduling note chemo on 4/20   Injection scheduling note neulasta 4/21   Labs Copper, CMP, LDH and uric acid   Scheduling:  Preferred lab:  Lab interval:     Imaging    Pharmacy appointment    Other referrals

## 2023-04-19 NOTE — NURSING
Presents to clinic for labs only. R CW port accessed per protocol. Labs drawn. Almanza left in place for additional treatment this morning. Discharged off unit in [facility].

## 2023-04-19 NOTE — PROGRESS NOTES
"Oncology Nutrition Assessment for Medical Nutrition Therapy  Initial Visit    Jeremy Martinez   1955    Referring Provider: No ref. provider found      Reason for Visit: nutrition counseling and education    PMHx:   Past Medical History:   Diagnosis Date    Erectile dysfunction     GERD (gastroesophageal reflux disease) 2010    Hyperlipidemia 2008    Hypertension 2007       Nutrition Assessment    This is a 68 y.o.male with a medical diagnosis of DLBCL s/p cycle 1 RCHOP. He reports that his appetite is starting to improve and his taste buds are coming back some. He is eating 2 meals/day and 1 midday snack. His wife is encouraging him to eat at least 3 times/day. He is supplementing with Boost High Protein, 1-1.5 daily. He is drinking plenty of water, keeps pitcher beside him.   He reports that over the past 1-2 weeks he is feeling like he has more energy and is moving around a lot more. He thinks this is part of the reason for large amount of weight loss over the past 2 weeks.     Weight: 53.65 kg (118 lb 4.4 oz)  Height: 5' 6" (1.676 m)  BMI: 19.09    Usual BW: 155-160lb  Weight Change: ~40lb loss over 6 months    Allergies: Amoxicillin    Current Medications:  Current Outpatient Medications:     acyclovir (ZOVIRAX) 400 MG tablet, Take 1 tablet (400 mg total) by mouth 2 (two) times daily., Disp: 60 tablet, Rfl: 5    allopurinoL (ZYLOPRIM) 100 MG tablet, Take 3 tablets (300 mg total) by mouth once daily., Disp: 30 tablet, Rfl: 2    azelastine (ASTELIN) 137 mcg (0.1 %) nasal spray, 1 spray (137 mcg total) by Nasal route 2 (two) times daily., Disp: 30 mL, Rfl: 3    HYDROcodone-acetaminophen (LORTAB ELIXIR)  mg/15 mL Soln, Take 15 mLs by mouth every 4 (four) hours as needed (pain)., Disp: 200 mL, Rfl: 0    KENALOG 40 mg/mL injection, , Disp: , Rfl:     LIDOcaine-prilocaine (EMLA) cream, Apply topically as needed., Disp: 30 g, Rfl: 0    lisinopriL (PRINIVIL,ZESTRIL) 40 MG tablet, Take 1 tablet (40 mg total) by " mouth once daily., Disp: 90 tablet, Rfl: 11    magnesium hydroxide 400 mg/5 ml (MILK OF MAGNESIA) 400 mg/5 mL Susp, Take 30 mLs (2,400 mg total) by mouth daily as needed., Disp: 118 mL, Rfl: 2    morphine 10 mg/5 mL solution, SMARTSI.5 Milliliter(s) By Mouth Every 6 Hours PRN, Disp: , Rfl:     omeprazole (PRILOSEC) 40 MG capsule, Take 1 capsule (40 mg total) by mouth 2 (two) times daily before meals., Disp: 180 capsule, Rfl: 1    ondansetron (ZOFRAN) 8 MG tablet, Take 1 tablet (8 mg total) by mouth every 8 (eight) hours as needed for Nausea., Disp: 30 tablet, Rfl: 2    predniSONE (DELTASONE) 50 MG Tab, Take 2 tablets (100 mg total) by mouth As instructed. Take 100 mg daily in the morning for 5 days once every 21 days, Disp: 60 tablet, Rfl: 0    promethazine (PHENERGAN) 12.5 MG Tab, Take 2 tablets (25 mg total) by mouth every 6 (six) hours as needed (Severe Nausea)., Disp: 30 tablet, Rfl: 2    sildenafiL (VIAGRA) 100 MG tablet, Take 1 tablet (100 mg total) by mouth as needed for Erectile Dysfunction., Disp: 12 tablet, Rfl: 1    simethicone (MYLICON) 80 MG chewable tablet, Chew and swallow 1 tablet (80 mg total) by mouth 2 hours after meals and at bedtime., Disp: 120 tablet, Rfl: 2    Food/medication interactions noted: none    Vitamins/Supplements: used to but stopped when started treatment    Labs: Reviewed    Nutrition Diagnosis    Problem: severe malnutrition  Etiology: appetite loss and dysgeusia  Signs/Symptoms: reports of inadequate PO intake, weight loss, and both fat & muscle wasting present    Nutrition Intervention    Nutrition Prescription   8509-2038 kcals (35-40kcal/kg)  80g protein (1.5g/kg)   1878-2146mL fluid (35-40mL/kg)    Recommendations:  Eat small frequent meals/snacks - aim for 5-6/day  Make meals/snacks high in calories and protein - examples reviewed  Change to Boost Plus - drink at least 1.5-2/day  Drink at least 64oz fluid/day  Discussed food safety guidelines to follow while on  chemo    Materials Provided/Reviewed: none    Nutrition Monitoring and Evaluation    Monitor: diet education needs, energy intake, and weight status    Goals: prevent further weight loss/encourage weight gain    Follow up: in 2-3 weeks    Communication to referring provider/care team: note available in chart    Counseling time: 15 minutes    Lorraine Morin MS, RD, LDN  [phone]

## 2023-04-20 ENCOUNTER — INFUSION (OUTPATIENT)
Dept: INFUSION THERAPY | Facility: HOSPITAL | Age: 68
End: 2023-04-20
Payer: MEDICARE

## 2023-04-20 VITALS
BODY MASS INDEX: 19.27 KG/M2 | HEART RATE: 69 BPM | WEIGHT: 119.94 LBS | SYSTOLIC BLOOD PRESSURE: 135 MMHG | RESPIRATION RATE: 18 BRPM | DIASTOLIC BLOOD PRESSURE: 81 MMHG | HEIGHT: 66 IN | TEMPERATURE: 98 F

## 2023-04-20 DIAGNOSIS — C83.38 DIFFUSE LARGE B-CELL LYMPHOMA OF LYMPH NODES OF MULTIPLE REGIONS: Primary | ICD-10-CM

## 2023-04-20 PROCEDURE — 96413 CHEMO IV INFUSION 1 HR: CPT | Mod: HCNC

## 2023-04-20 PROCEDURE — 63600175 PHARM REV CODE 636 W HCPCS: Mod: HCNC | Performed by: INTERNAL MEDICINE

## 2023-04-20 PROCEDURE — 96417 CHEMO IV INFUS EACH ADDL SEQ: CPT | Mod: HCNC

## 2023-04-20 PROCEDURE — 96411 CHEMO IV PUSH ADDL DRUG: CPT

## 2023-04-20 PROCEDURE — 25000003 PHARM REV CODE 250: Mod: HCNC | Performed by: INTERNAL MEDICINE

## 2023-04-20 PROCEDURE — 96375 TX/PRO/DX INJ NEW DRUG ADDON: CPT | Mod: HCNC

## 2023-04-20 PROCEDURE — A4216 STERILE WATER/SALINE, 10 ML: HCPCS | Mod: HCNC | Performed by: INTERNAL MEDICINE

## 2023-04-20 PROCEDURE — 96415 CHEMO IV INFUSION ADDL HR: CPT | Mod: HCNC

## 2023-04-20 PROCEDURE — 96367 TX/PROPH/DG ADDL SEQ IV INF: CPT | Mod: HCNC

## 2023-04-20 RX ORDER — PREDNISONE 50 MG/1
100 TABLET ORAL DAILY
Qty: 60 TABLET | Refills: 0 | Status: SHIPPED | OUTPATIENT
Start: 2023-04-21 | End: 2023-09-05

## 2023-04-20 RX ORDER — SODIUM CHLORIDE 0.9 % (FLUSH) 0.9 %
10 SYRINGE (ML) INJECTION
Status: DISCONTINUED | OUTPATIENT
Start: 2023-04-20 | End: 2023-04-20 | Stop reason: HOSPADM

## 2023-04-20 RX ORDER — METHYLPREDNISOLONE SOD SUCC 125 MG
125 VIAL (EA) INJECTION
Status: DISCONTINUED | OUTPATIENT
Start: 2023-04-20 | End: 2023-04-20 | Stop reason: HOSPADM

## 2023-04-20 RX ORDER — MEPERIDINE HYDROCHLORIDE 50 MG/ML
25 INJECTION INTRAMUSCULAR; INTRAVENOUS; SUBCUTANEOUS
Status: DISCONTINUED | OUTPATIENT
Start: 2023-04-20 | End: 2023-04-20 | Stop reason: HOSPADM

## 2023-04-20 RX ORDER — FAMOTIDINE 10 MG/ML
20 INJECTION INTRAVENOUS
Status: COMPLETED | OUTPATIENT
Start: 2023-04-20 | End: 2023-04-20

## 2023-04-20 RX ORDER — HEPARIN 100 UNIT/ML
500 SYRINGE INTRAVENOUS
Status: DISCONTINUED | OUTPATIENT
Start: 2023-04-20 | End: 2023-04-20 | Stop reason: HOSPADM

## 2023-04-20 RX ORDER — ACETAMINOPHEN 325 MG/1
650 TABLET ORAL
Status: COMPLETED | OUTPATIENT
Start: 2023-04-20 | End: 2023-04-20

## 2023-04-20 RX ORDER — DOXORUBICIN HYDROCHLORIDE 2 MG/ML
50 INJECTION, SOLUTION INTRAVENOUS
Status: COMPLETED | OUTPATIENT
Start: 2023-04-20 | End: 2023-04-20

## 2023-04-20 RX ORDER — DIPHENHYDRAMINE HYDROCHLORIDE 50 MG/ML
50 INJECTION INTRAMUSCULAR; INTRAVENOUS
Status: DISCONTINUED | OUTPATIENT
Start: 2023-04-20 | End: 2023-04-20 | Stop reason: HOSPADM

## 2023-04-20 RX ADMIN — SODIUM CHLORIDE: 0.9 INJECTION, SOLUTION INTRAVENOUS at 10:04

## 2023-04-20 RX ADMIN — SODIUM CHLORIDE 593 MG: 9 INJECTION, SOLUTION INTRAVENOUS at 11:04

## 2023-04-20 RX ADMIN — ACETAMINOPHEN 650 MG: 325 TABLET ORAL at 10:04

## 2023-04-20 RX ADMIN — DOXORUBICIN HYDROCHLORIDE 80 MG: 2 INJECTION, SOLUTION INTRAVENOUS at 02:04

## 2023-04-20 RX ADMIN — FAMOTIDINE 20 MG: 10 INJECTION INTRAVENOUS at 10:04

## 2023-04-20 RX ADMIN — VINCRISTINE SULFATE 2 MG: 1 INJECTION, SOLUTION INTRAVENOUS at 02:04

## 2023-04-20 RX ADMIN — DIPHENHYDRAMINE HYDROCHLORIDE 50 MG: 50 INJECTION, SOLUTION INTRAMUSCULAR; INTRAVENOUS at 10:04

## 2023-04-20 RX ADMIN — DEXAMETHASONE SODIUM PHOSPHATE 0.25 MG: 4 INJECTION, SOLUTION INTRA-ARTICULAR; INTRALESIONAL; INTRAMUSCULAR; INTRAVENOUS; SOFT TISSUE at 02:04

## 2023-04-20 RX ADMIN — CYCLOPHOSPHAMIDE 1180 MG: 200 INJECTION, SOLUTION INTRAVENOUS at 03:04

## 2023-04-20 RX ADMIN — HEPARIN 500 UNITS: 100 SYRINGE at 04:04

## 2023-04-20 RX ADMIN — Medication 10 ML: at 04:04

## 2023-04-20 NOTE — PLAN OF CARE
Pt tolerated C1D1 Ruxience and C2 of CHOP today. NAD. Port flushed + blood return present, flushed. Hep lock and deaccesed. declined AVS. Uses my [facility]. Discharged home. Ambulated independently with family.   Problem: Adult Inpatient Plan of Care  Goal: Optimal Comfort and Wellbeing  Intervention: Provide Person-Centered Care  Flowsheets (Taken 4/20/2023 2774)  Trust Relationship/Rapport:   care explained   thoughts/feelings acknowledged   choices provided   emotional support provided   empathic listening provided   questions answered   questions encouraged   reassurance provided

## 2023-04-21 ENCOUNTER — INFUSION (OUTPATIENT)
Dept: INFUSION THERAPY | Facility: HOSPITAL | Age: 68
End: 2023-04-21
Attending: INTERNAL MEDICINE
Payer: MEDICARE

## 2023-04-21 DIAGNOSIS — C83.38 DIFFUSE LARGE B-CELL LYMPHOMA OF LYMPH NODES OF MULTIPLE REGIONS: Primary | ICD-10-CM

## 2023-04-21 PROCEDURE — 96372 THER/PROPH/DIAG INJ SC/IM: CPT | Mod: HCNC

## 2023-04-21 PROCEDURE — 63600175 PHARM REV CODE 636 W HCPCS: Mod: JZ,JG,HCNC | Performed by: INTERNAL MEDICINE

## 2023-04-21 RX ADMIN — PEGFILGRASTIM-CBQV 6 MG: 6 INJECTION, SOLUTION SUBCUTANEOUS at 01:04

## 2023-04-21 NOTE — NURSING
Presents to clinic for Udenyca injection w/o acute complaints. Medication administered per orders. Tolerated Well. Discharged off unit in NAD

## 2023-05-11 ENCOUNTER — INFUSION (OUTPATIENT)
Dept: INFUSION THERAPY | Facility: HOSPITAL | Age: 68
End: 2023-05-11
Payer: MEDICARE

## 2023-05-11 ENCOUNTER — OFFICE VISIT (OUTPATIENT)
Dept: HEMATOLOGY/ONCOLOGY | Facility: CLINIC | Age: 68
End: 2023-05-11
Payer: MEDICARE

## 2023-05-11 ENCOUNTER — CLINICAL SUPPORT (OUTPATIENT)
Dept: HEMATOLOGY/ONCOLOGY | Facility: CLINIC | Age: 68
End: 2023-05-11
Payer: MEDICARE

## 2023-05-11 VITALS
HEIGHT: 66 IN | TEMPERATURE: 98 F | WEIGHT: 118.38 LBS | DIASTOLIC BLOOD PRESSURE: 63 MMHG | SYSTOLIC BLOOD PRESSURE: 114 MMHG | BODY MASS INDEX: 19.03 KG/M2 | HEART RATE: 72 BPM | RESPIRATION RATE: 18 BRPM

## 2023-05-11 VITALS
OXYGEN SATURATION: 99 % | SYSTOLIC BLOOD PRESSURE: 118 MMHG | RESPIRATION RATE: 16 BRPM | BODY MASS INDEX: 19.01 KG/M2 | HEIGHT: 66 IN | TEMPERATURE: 97 F | HEART RATE: 82 BPM | WEIGHT: 118.25 LBS | DIASTOLIC BLOOD PRESSURE: 70 MMHG

## 2023-05-11 DIAGNOSIS — C83.38 DIFFUSE LARGE B-CELL LYMPHOMA OF LYMPH NODES OF MULTIPLE REGIONS: Primary | ICD-10-CM

## 2023-05-11 DIAGNOSIS — R10.9 ABDOMINAL PAIN, UNSPECIFIED ABDOMINAL LOCATION: ICD-10-CM

## 2023-05-11 DIAGNOSIS — E43 SEVERE MALNUTRITION: ICD-10-CM

## 2023-05-11 DIAGNOSIS — D64.9 ANEMIA OF UNKNOWN ETIOLOGY: ICD-10-CM

## 2023-05-11 DIAGNOSIS — Z71.3 NUTRITIONAL COUNSELING: ICD-10-CM

## 2023-05-11 DIAGNOSIS — T45.1X5A CINV (CHEMOTHERAPY-INDUCED NAUSEA AND VOMITING): ICD-10-CM

## 2023-05-11 DIAGNOSIS — E78.5 HYPERLIPIDEMIA, UNSPECIFIED HYPERLIPIDEMIA TYPE: ICD-10-CM

## 2023-05-11 DIAGNOSIS — R11.2 CINV (CHEMOTHERAPY-INDUCED NAUSEA AND VOMITING): ICD-10-CM

## 2023-05-11 DIAGNOSIS — I10 PRIMARY HYPERTENSION: ICD-10-CM

## 2023-05-11 DIAGNOSIS — R53.82 CHRONIC FATIGUE: ICD-10-CM

## 2023-05-11 PROCEDURE — 63600175 PHARM REV CODE 636 W HCPCS: Mod: HCNC | Performed by: NURSE PRACTITIONER

## 2023-05-11 PROCEDURE — 99999 PR PBB SHADOW E&M-EST. PATIENT-LVL IV: CPT | Mod: PBBFAC,HCNC,, | Performed by: NURSE PRACTITIONER

## 2023-05-11 PROCEDURE — 97803 MED NUTRITION INDIV SUBSEQ: CPT | Mod: HCNC,S$GLB,, | Performed by: DIETITIAN, REGISTERED

## 2023-05-11 PROCEDURE — 96413 CHEMO IV INFUSION 1 HR: CPT | Mod: HCNC

## 2023-05-11 PROCEDURE — 1126F PR PAIN SEVERITY QUANTIFIED, NO PAIN PRESENT: ICD-10-PCS | Mod: HCNC,CPTII,S$GLB, | Performed by: NURSE PRACTITIONER

## 2023-05-11 PROCEDURE — 1126F AMNT PAIN NOTED NONE PRSNT: CPT | Mod: HCNC,CPTII,S$GLB, | Performed by: NURSE PRACTITIONER

## 2023-05-11 PROCEDURE — 3008F PR BODY MASS INDEX (BMI) DOCUMENTED: ICD-10-PCS | Mod: HCNC,CPTII,S$GLB, | Performed by: NURSE PRACTITIONER

## 2023-05-11 PROCEDURE — 3078F PR MOST RECENT DIASTOLIC BLOOD PRESSURE < 80 MM HG: ICD-10-PCS | Mod: HCNC,CPTII,S$GLB, | Performed by: NURSE PRACTITIONER

## 2023-05-11 PROCEDURE — 4010F PR ACE/ARB THEARPY RXD/TAKEN: ICD-10-PCS | Mod: HCNC,CPTII,S$GLB, | Performed by: NURSE PRACTITIONER

## 2023-05-11 PROCEDURE — 96415 CHEMO IV INFUSION ADDL HR: CPT | Mod: HCNC

## 2023-05-11 PROCEDURE — 3074F PR MOST RECENT SYSTOLIC BLOOD PRESSURE < 130 MM HG: ICD-10-PCS | Mod: HCNC,CPTII,S$GLB, | Performed by: NURSE PRACTITIONER

## 2023-05-11 PROCEDURE — 25000003 PHARM REV CODE 250: Mod: HCNC | Performed by: NURSE PRACTITIONER

## 2023-05-11 PROCEDURE — 96417 CHEMO IV INFUS EACH ADDL SEQ: CPT | Mod: HCNC

## 2023-05-11 PROCEDURE — 96411 CHEMO IV PUSH ADDL DRUG: CPT | Mod: HCNC

## 2023-05-11 PROCEDURE — 3074F SYST BP LT 130 MM HG: CPT | Mod: HCNC,CPTII,S$GLB, | Performed by: NURSE PRACTITIONER

## 2023-05-11 PROCEDURE — 96375 TX/PRO/DX INJ NEW DRUG ADDON: CPT | Mod: HCNC

## 2023-05-11 PROCEDURE — 3078F DIAST BP <80 MM HG: CPT | Mod: HCNC,CPTII,S$GLB, | Performed by: NURSE PRACTITIONER

## 2023-05-11 PROCEDURE — 99999 PR PBB SHADOW E&M-EST. PATIENT-LVL IV: ICD-10-PCS | Mod: PBBFAC,HCNC,, | Performed by: NURSE PRACTITIONER

## 2023-05-11 PROCEDURE — 96367 TX/PROPH/DG ADDL SEQ IV INF: CPT | Mod: HCNC

## 2023-05-11 PROCEDURE — 99214 OFFICE O/P EST MOD 30 MIN: CPT | Mod: HCNC,S$GLB,, | Performed by: NURSE PRACTITIONER

## 2023-05-11 PROCEDURE — 3008F BODY MASS INDEX DOCD: CPT | Mod: HCNC,CPTII,S$GLB, | Performed by: NURSE PRACTITIONER

## 2023-05-11 PROCEDURE — 4010F ACE/ARB THERAPY RXD/TAKEN: CPT | Mod: HCNC,CPTII,S$GLB, | Performed by: NURSE PRACTITIONER

## 2023-05-11 PROCEDURE — 99214 PR OFFICE/OUTPT VISIT, EST, LEVL IV, 30-39 MIN: ICD-10-PCS | Mod: HCNC,S$GLB,, | Performed by: NURSE PRACTITIONER

## 2023-05-11 PROCEDURE — 97803 PR MED NUTR THER, SUBSQ, INDIV, EA 15 MIN: ICD-10-PCS | Mod: HCNC,S$GLB,, | Performed by: DIETITIAN, REGISTERED

## 2023-05-11 RX ORDER — HEPARIN 100 UNIT/ML
500 SYRINGE INTRAVENOUS
Status: DISCONTINUED | OUTPATIENT
Start: 2023-05-11 | End: 2023-05-11 | Stop reason: HOSPADM

## 2023-05-11 RX ORDER — SODIUM CHLORIDE 0.9 % (FLUSH) 0.9 %
10 SYRINGE (ML) INJECTION
Status: CANCELLED | OUTPATIENT
Start: 2023-05-11

## 2023-05-11 RX ORDER — DOXORUBICIN HYDROCHLORIDE 2 MG/ML
50 INJECTION, SOLUTION INTRAVENOUS
Status: CANCELLED | OUTPATIENT
Start: 2023-05-11

## 2023-05-11 RX ORDER — DOXORUBICIN HYDROCHLORIDE 2 MG/ML
50 INJECTION, SOLUTION INTRAVENOUS
Status: COMPLETED | OUTPATIENT
Start: 2023-05-11 | End: 2023-05-11

## 2023-05-11 RX ORDER — ACETAMINOPHEN 325 MG/1
650 TABLET ORAL
Status: CANCELLED | OUTPATIENT
Start: 2023-05-11

## 2023-05-11 RX ORDER — ACETAMINOPHEN 325 MG/1
650 TABLET ORAL
Status: COMPLETED | OUTPATIENT
Start: 2023-05-11 | End: 2023-05-11

## 2023-05-11 RX ORDER — MEPERIDINE HYDROCHLORIDE 50 MG/ML
25 INJECTION INTRAMUSCULAR; INTRAVENOUS; SUBCUTANEOUS
Status: CANCELLED | OUTPATIENT
Start: 2023-05-11 | End: 2023-05-12

## 2023-05-11 RX ORDER — HEPARIN 100 UNIT/ML
500 SYRINGE INTRAVENOUS
Status: CANCELLED | OUTPATIENT
Start: 2023-05-11

## 2023-05-11 RX ORDER — SODIUM CHLORIDE 0.9 % (FLUSH) 0.9 %
10 SYRINGE (ML) INJECTION
Status: DISCONTINUED | OUTPATIENT
Start: 2023-05-11 | End: 2023-05-11 | Stop reason: HOSPADM

## 2023-05-11 RX ORDER — FAMOTIDINE 10 MG/ML
20 INJECTION INTRAVENOUS
Status: CANCELLED | OUTPATIENT
Start: 2023-05-11

## 2023-05-11 RX ORDER — FAMOTIDINE 10 MG/ML
20 INJECTION INTRAVENOUS
Status: COMPLETED | OUTPATIENT
Start: 2023-05-11 | End: 2023-05-11

## 2023-05-11 RX ORDER — MEPERIDINE HYDROCHLORIDE 50 MG/ML
25 INJECTION INTRAMUSCULAR; INTRAVENOUS; SUBCUTANEOUS
Status: DISCONTINUED | OUTPATIENT
Start: 2023-05-11 | End: 2023-05-11 | Stop reason: HOSPADM

## 2023-05-11 RX ADMIN — SODIUM CHLORIDE: 0.9 INJECTION, SOLUTION INTRAVENOUS at 10:05

## 2023-05-11 RX ADMIN — SODIUM CHLORIDE 593 MG: 0.9 INJECTION, SOLUTION INTRAVENOUS at 11:05

## 2023-05-11 RX ADMIN — DEXAMETHASONE SODIUM PHOSPHATE 0.25 MG: 4 INJECTION, SOLUTION INTRA-ARTICULAR; INTRALESIONAL; INTRAMUSCULAR; INTRAVENOUS; SOFT TISSUE at 01:05

## 2023-05-11 RX ADMIN — DOXORUBICIN HYDROCHLORIDE 80 MG: 2 INJECTION, SOLUTION INTRAVENOUS at 01:05

## 2023-05-11 RX ADMIN — CYCLOPHOSPHAMIDE 1180 MG: 200 INJECTION, SOLUTION INTRAVENOUS at 01:05

## 2023-05-11 RX ADMIN — VINCRISTINE SULFATE 2 MG: 1 INJECTION, SOLUTION INTRAVENOUS at 01:05

## 2023-05-11 RX ADMIN — DIPHENHYDRAMINE HYDROCHLORIDE 50 MG: 50 INJECTION, SOLUTION INTRAMUSCULAR; INTRAVENOUS at 10:05

## 2023-05-11 RX ADMIN — FAMOTIDINE 20 MG: 10 INJECTION INTRAVENOUS at 10:05

## 2023-05-11 RX ADMIN — ACETAMINOPHEN 650 MG: 325 TABLET ORAL at 10:05

## 2023-05-11 RX ADMIN — HEPARIN 500 UNITS: 100 SYRINGE at 03:05

## 2023-05-11 NOTE — PROGRESS NOTES
"Oncology Nutrition Assessment for Medical Nutrition Therapy  Follow-up Visit    Jeremy Martinez   1955    Referring Provider: No ref. provider found      Reason for Visit: nutrition counseling and education    PMHx:   Past Medical History:   Diagnosis Date    Erectile dysfunction     GERD (gastroesophageal reflux disease) 2010    Hyperlipidemia 2008    Hypertension 2007       Nutrition Assessment    This is a 68 y.o.male with a medical diagnosis of DLBCL s/p cycle 2 RCHOP. He is known to me from previous visit about 3 weeks ago. His weight is stable since then. He reports that his appetite continues to improve, eating better since we last talked. He changed to Boost Plus and is drinking 2/day. He reports he is moving around more, admits sometimes too much.     Weight: 53.65 kg (118 lb 4.4 oz)  Height: 5' 6" (1.676 m)  BMI: 19.09    Usual BW: 155-160lb  Weight Change: ~40lb loss over 6 months    Allergies: Amoxicillin    Current Medications:  Current Outpatient Medications:     acyclovir (ZOVIRAX) 400 MG tablet, Take 1 tablet (400 mg total) by mouth 2 (two) times daily., Disp: 60 tablet, Rfl: 5    allopurinoL (ZYLOPRIM) 100 MG tablet, Take 3 tablets (300 mg total) by mouth once daily., Disp: 30 tablet, Rfl: 2    azelastine (ASTELIN) 137 mcg (0.1 %) nasal spray, 1 spray (137 mcg total) by Nasal route 2 (two) times daily., Disp: 30 mL, Rfl: 3    HYDROcodone-acetaminophen (LORTAB ELIXIR)  mg/15 mL Soln, Take 15 mLs by mouth every 4 (four) hours as needed (pain)., Disp: 200 mL, Rfl: 0    KENALOG 40 mg/mL injection, , Disp: , Rfl:     LIDOcaine-prilocaine (EMLA) cream, Apply topically as needed., Disp: 30 g, Rfl: 0    lisinopriL (PRINIVIL,ZESTRIL) 40 MG tablet, Take 1 tablet (40 mg total) by mouth once daily., Disp: 90 tablet, Rfl: 11    magnesium hydroxide 400 mg/5 ml (MILK OF MAGNESIA) 400 mg/5 mL Susp, Take 30 mLs (2,400 mg total) by mouth daily as needed., Disp: 118 mL, Rfl: 2    magnesium oxide (MAG-OX) 400 " mg (241.3 mg magnesium) tablet, Take 1 tablet (400 mg total) by mouth once daily., Disp: 60 tablet, Rfl: 0    morphine 10 mg/5 mL solution, SMARTSI.5 Milliliter(s) By Mouth Every 6 Hours PRN, Disp: , Rfl:     omeprazole (PRILOSEC) 40 MG capsule, Take 1 capsule (40 mg total) by mouth 2 (two) times daily before meals., Disp: 180 capsule, Rfl: 1    ondansetron (ZOFRAN) 8 MG tablet, Take 1 tablet (8 mg total) by mouth every 8 (eight) hours as needed for Nausea., Disp: 30 tablet, Rfl: 2    potassium chloride SA (K-DUR,KLOR-CON) 20 MEQ tablet, Take 1 tablet (20 mEq total) by mouth 3 (three) times daily., Disp: 30 tablet, Rfl: 11    predniSONE (DELTASONE) 50 MG Tab, Take 2 tablets (100 mg total) by mouth As instructed. Take 100 mg daily in the morning for 5 days once every 21 days, Disp: 60 tablet, Rfl: 0    predniSONE (DELTASONE) 50 MG Tab, Take 2 tablets (100 mg total) by mouth once daily. Take on days 2-5 of your chemotherapy cycles., Disp: 60 tablet, Rfl: 0    promethazine (PHENERGAN) 12.5 MG Tab, Take 2 tablets (25 mg total) by mouth every 6 (six) hours as needed (Severe Nausea)., Disp: 30 tablet, Rfl: 2    sildenafiL (VIAGRA) 100 MG tablet, Take 1 tablet (100 mg total) by mouth as needed for Erectile Dysfunction., Disp: 12 tablet, Rfl: 1    simethicone (MYLICON) 80 MG chewable tablet, Chew and swallow 1 tablet (80 mg total) by mouth 2 hours after meals and at bedtime., Disp: 120 tablet, Rfl: 2    Food/medication interactions noted: none    Vitamins/Supplements: used to but stopped when started treatment    Labs: Reviewed    Nutrition Diagnosis    Problem: severe malnutrition  Etiology: appetite loss and dysgeusia  Signs/Symptoms: reports of inadequate PO intake, weight loss, and both fat & muscle wasting present  Status: Improving    Nutrition Intervention    Nutrition Prescription   6347-2946 kcals (35-40kcal/kg)  80g protein (1.5g/kg)   1878-2146mL fluid (35-40mL/kg)    Recommendations:  Eat small frequent  meals/snacks - aim for 5-6/day  Make meals/snacks high in calories and protein - examples reviewed  Continue to drink Boost Plus, at least 1.5-2/day  Drink at least 64oz fluid/day    Materials Provided/Reviewed: none    Nutrition Monitoring and Evaluation    Monitor: diet education needs, energy intake, and weight status    Goals: prevent further weight loss/encourage weight gain    Follow up: Patient provided with dietitian contact information and advised to call/message with questions or to make future appointment if further intervention is needed.    Communication to referring provider/care team: note available in chart; discussed with NP    Counseling time: 10 minutes    Lorraine Morin MS, RD, LDN  [phone]

## 2023-05-11 NOTE — PROGRESS NOTES
CC: DLBCL, hematology follow up    HPI: , 68, M, is here for hematology follow up. He has diffuse large B cell lymphoma. He has GERD, HLD, HTn. He presented to outside hospital in 2023 with epigastric pain.  He was found to have an esophageal mass and was transferred to [facility] [place] further care. He underwent endoscopy with biopsy of GI mass . He remained stable on liquid diet.     He has lost ~30 lbs in the past 6 months. Denies fevers, night sweats, upper/lower GI bleeding.   Interval History: He is here for follow up prior to cycle 3. He feels better. Pain is improved. His constipation has resolved. Weight remains stable. Denies fever, chills, black stools, hematochezia, heron.     Review of patient's allergies indicates:   Allergen Reactions    Amoxicillin Hives and Rash       Current Outpatient Medications   Medication Sig    acyclovir (ZOVIRAX) 400 MG tablet Take 1 tablet (400 mg total) by mouth 2 (two) times daily.    allopurinoL (ZYLOPRIM) 100 MG tablet Take 3 tablets (300 mg total) by mouth once daily.    azelastine (ASTELIN) 137 mcg (0.1 %) nasal spray 1 spray (137 mcg total) by Nasal route 2 (two) times daily.    HYDROcodone-acetaminophen (LORTAB ELIXIR)  mg/15 mL Soln Take 15 mLs by mouth every 4 (four) hours as needed (pain).    KENALOG 40 mg/mL injection     LIDOcaine-prilocaine (EMLA) cream Apply topically as needed.    lisinopriL (PRINIVIL,ZESTRIL) 40 MG tablet Take 1 tablet (40 mg total) by mouth once daily.    magnesium hydroxide 400 mg/5 ml (MILK OF MAGNESIA) 400 mg/5 mL Susp Take 30 mLs (2,400 mg total) by mouth daily as needed.    magnesium oxide (MAG-OX) 400 mg (241.3 mg magnesium) tablet Take 1 tablet (400 mg total) by mouth once daily.    morphine 10 mg/5 mL solution SMARTSI.5 Milliliter(s) By Mouth Every 6 Hours PRN    omeprazole (PRILOSEC) 40 MG capsule Take 1 capsule (40 mg total) by mouth 2 (two) times daily before meals.    ondansetron (ZOFRAN) 8 MG tablet Take 1  tablet (8 mg total) by mouth every 8 (eight) hours as needed for Nausea.    potassium chloride SA (K-DUR,KLOR-CON) 20 MEQ tablet Take 1 tablet (20 mEq total) by mouth 3 (three) times daily.    predniSONE (DELTASONE) 50 MG Tab Take 2 tablets (100 mg total) by mouth As instructed. Take 100 mg daily in the morning for 5 days once every 21 days    predniSONE (DELTASONE) 50 MG Tab Take 2 tablets (100 mg total) by mouth once daily. Take on days 2-5 of your chemotherapy cycles.    promethazine (PHENERGAN) 12.5 MG Tab Take 2 tablets (25 mg total) by mouth every 6 (six) hours as needed (Severe Nausea).    sildenafiL (VIAGRA) 100 MG tablet Take 1 tablet (100 mg total) by mouth as needed for Erectile Dysfunction.    simethicone (MYLICON) 80 MG chewable tablet Chew and swallow 1 tablet (80 mg total) by mouth 2 hours after meals and at bedtime.     No current facility-administered medications for this visit.     Facility-Administered Medications Ordered in Other Visits   Medication    alteplase injection 2 mg    cycloPHOSphamide 750 mg/m2 = 1,180 mg in sodium chloride 0.9% 290.9 mL chemo infusion    DOXOrubicin chemo injection 80 mg    heparin, porcine (PF) 100 unit/mL injection flush 500 Units    meperidine injection 25 mg    palonosetron 0.25mg/dexAMETHasone 12mg in NS IVPB 0.25 mg 50 mL    sodium chloride 0.9% 250 mL flush bag    sodium chloride 0.9% flush 10 mL    vinCRIStine (ONCOVIN) 2 mg in sodium chloride 0.9% 65 mL chemo infusion          Review of Systems   Constitutional:  Positive for malaise/fatigue (improving) and weight loss (remains stable now). Negative for chills, diaphoresis and fever.   HENT:  Negative for congestion, ear pain, hearing loss, nosebleeds, sinus pain and tinnitus.    Eyes:  Negative for blurred vision, double vision, photophobia, pain and discharge.   Respiratory:  Negative for cough, hemoptysis and sputum production.    Cardiovascular:  Negative for palpitations, orthopnea, claudication and leg  swelling.   Gastrointestinal:  Positive for abdominal pain (resolved), constipation (resolved), heartburn (improved) and nausea (occasionally). Negative for blood in stool, diarrhea, melena and vomiting.   Genitourinary:  Negative for dysuria, frequency, hematuria and urgency.   Musculoskeletal:  Negative for back pain, myalgias and neck pain.   Neurological:  Negative for tingling, tremors, focal weakness, weakness and headaches.   Endo/Heme/Allergies:  Negative for environmental allergies.   Psychiatric/Behavioral:  Negative for depression, hallucinations and substance abuse. The patient is not nervous/anxious and does not have insomnia.           Vitals:    05/11/23 0900   BP: 118/70   Pulse: 82   Resp: 16   Temp: 97.4 °F (36.3 °C)     PS: ECOG 2    Physical Exam  Constitutional:       Appearance: Normal appearance.   HENT:      Head: Normocephalic and atraumatic.      Mouth/Throat:      Pharynx: No posterior oropharyngeal erythema.   Eyes:      General: No scleral icterus.  Cardiovascular:      Rate and Rhythm: Normal rate and regular rhythm.      Heart sounds: No murmur heard.  Pulmonary:      Effort: Pulmonary effort is normal. No respiratory distress.      Breath sounds: Normal breath sounds. No rhonchi.   Abdominal:      General: There is no distension.      Palpations: There is no mass.      Tenderness: There is no abdominal tenderness.   Musculoskeletal:         General: No swelling.   Lymphadenopathy:      Cervical: No cervical adenopathy.   Skin:     Coloration: Skin is not jaundiced.   Neurological:      General: No focal deficit present.      Mental Status: He is alert and oriented to person, place, and time.      Cranial Nerves: No cranial nerve deficit.        2/18/23 CT abdomen pelvis with contrast    COMPARISON:  None     FINDINGS:  Abdomen:     - Lung bases: There is focal emphysematous change in the medial left lung base.  Lung bases contain mild areas of peripheral chronic atelectasis.  A focus  of suspected atelectasis simulates a nodular opacity at the right dome of the diaphragm.     There is distal esophageal distension with fluid secondary to and infiltrative type mass involving the visualized cardiac portion of the stomach extending to the GE junction concerning for malignancy.  There is a mantle of lobular adenopathy surrounding the proximal abdominal aorta appearing to involve the retrocrural lymph nodes and adjacent periaortic and pericaval lymph nodes to the level of the left renal vein with the renal a vein somewhat displaced.  No visible renal vein invasion/thrombosis.  Smaller shotty type lymph nodes are seen caudal to the renal veins in the retroperitoneum.     - Liver: The liver appears homogeneous and not significantly enlarged.     - Gallbladder: There is no CT evidence of cholecystitis or cholelithiasis.     - Bile Ducts: No evidence of intra or extra hepatic biliary ductal dilation.     - Spleen: Negative.     - Kidneys: The kidneys enhance symmetrically and homogeneously.     - Adrenals: Unremarkable.     - Pancreas: Pancreas is displaced anteriorly by the retroperitoneal adenopathy with an ill-defined mass around 3 cm in diameter in the body appearing contiguous with the pancreatic parenchyma and similar in density to the retroperitoneal adenopathy consistent with involvement/invasion.  Additionally there is no fat plane margin between the posterior pancreatic body and the lobular retroperitoneal mantle of adenopathy.  Heterogeneous enhancement of the body of the pancreas in this region and poor visualization of the splenic artery and vein within the body suggests vessel encasement/involvement.  Partial thrombosis of portions of these vessels is suspected.     -Vascular: No abdominal aortic aneurysm or significant atherosclerosis.     - Abdominal wall:  There is a small right inguinal hernia.     Small bowel and colon: There is no small bowel distension.  There is no mesenteric  convincing adenopathy.  Shotty lymph nodes are noted.  No extraluminal free air or free fluid is seen in the abdomen or pelvis.  Appendix is not isolated as a separate structure however there is no evidence of appendicitis.  Few scattered diverticuli are seen distally in the colon.  No diverticulitis.     Pelvis:     There is no pelvic mass, adenopathy, or free fluid.  Bladder is unremarkable noting mild thickening of the mucosa can be explained by under distension.  The prostate is borderline mildly prominent.     Bones:  No acute osseous abnormality and no suspicious lytic or blastic lesion.     Impression:     Bulky infiltrative type mass compatible with malignancy involving the gastric cardiac portion extending to the GE junction with esophageal high-grade obstruction .  Further evaluation can be made with CT chest.     Bulky adenopathy in the retroperitoneum in the periaortic and pericaval region to the level of the left renal vein also appearing to involve the dorsal pancreatic body and also appearing to involve and obstruct the splenic artery and vein as described.     Incidental additional nonacute findings are discussed in the body of the report.       2/20/23 upper GI EUS     --One extrinsic moderate stenosis was found at the gastroesophageal junction. The stenosis was traversed.      --  A large, fungating and infiltrative mass with oozing bleeding was found in the cardia, in the gastric fundus and in the gastric body.        --Biopsies were taken with a cold forceps for histology.        --The examined duodenum was normal.          ENDOSONOGRAPHIC FINDING:          -- The esophagus and adjacent structures were visualized endosonographically.      --  A hypoechoic mass was identified endosonographically in the cardia of the stomach and in the fundus of the stomach (seen from the GE  junction, as EUS scope could not traverse the area of extrinsic        stenosis). The mass measured 52 mm by 62 mm in  maximal cross-sectional diameter.     Impression:            - Extrinsic narrowing of the esophagus.                          - Gastric tumor in the cardia, in the gastric fundus and in the gastric body. Biopsied.                          - Normal examined duodenum.         1. GASTRIC MASS BIOPSY:            -  Diffuse large B-cell lymphoma, non-germinal center subtype          -  High proliferation index (99% by Ki-67 immunostain)          -  Negative for CD30 co-expression by immunostain          -  TREY JANA positive for EBV-encoded small mRNAs in  few scattered   bystander lymphocytes          -   PENDING  (send-out): MYC tech only immunostain and Double/Triple Hit Lymphoma FISH panel with results to be reported in a separate supplemental and final diagnosis may be further classified             at that time   COMMENT: No flow cytometric analysis was performed on this specimen    Low and high power examination reveal minute irregular fragments of gastric tissue with an atypical diffuse submucosal infiltrate with focal crush artifacts and composed predominantly of large lymphoma cells with occasional   prominent central nucleoli and focal areas with increased scattered mature eosinophils and rarer scattered neutrophils, histiocytes, small mature lymphocytes, and plasma cells.   AE1/AE3, Synaptophysin, and Chromogranin immunostains were performed with adequate controls on block 1A by the referring surgical pathologist who   initially reviewed this biopsy and are negative for carcinoma or infiltrating neuroendocrine cells.   Immunohistochemical study with stains for CD3, CD5, BCL-2, CD20, CD23, Cyclin D1, CD10, BCL-6, MUM-1, CD30, and Ki-67 and EBV-encoded small mRNAs in-situ hybridization (TREY JANA) were performed on block 1A with appropriate controls   for increased sensitivity and cellular localization within the cells of interest.  Unstained slides of block 1A were sent out to [facility]   [facility] for MYC  tech only immunostain with results to be interpreted at [facility] and Double/Triple Hit Lymphoma FISH panel.   The large lymphoma cells are positive for CD20 and MUM-1 (nuclear) and negative for AE1/AE3, Synaptophysin, Chromogranin, CD3, CD5, CD23, Cyclin D1   (nuclear), CD10 (dim <30%), BCL-6 (strong nuclear <30%), and CD30. Ki-67 (nuclear) highlights a high proliferation index (99%). TREY JANA stains few scattered small and medium-sized bystander lymphocytes.   AE1/A3 stains mucosal and glandular epithelial cells. Synaptophysin and/or Chromogranin stain a rare subset of normal neuroendocrine cells associated   with the glandular epithelial cells.   CD3, CD5, and BCL-2 co-stain numerous scattered reactive T-cells within the focal non-involved areas of the biopsy. CD5 also appears to stain glandular   epithelial cells and a rare subset of the mucosal epithelial cells. Cyclin D1 (nuclear) stains epithelial and endothelial cells. CD10 stains stromal cells and few scattered neutrophils. Rare scattered medium-sized CD30+ cells (favor   reactive immunoblasts) are identified      3/8/23 hemoglobin electrophoresis: No electrophoretic evidence of abnormal hemoglobin or beta thalassemia. See comment    3/23/23 PET CT      COMPARISON:  CT abdomen pelvis 02/18/2023     FINDINGS:  Quality of the study: Adequate.     Reference values:     Mediastinal blood pool maximum SUV: 1.7     Normal liver background maximum SUV: 1.4     There is lymphadenopathy above below the diaphragm.     In the head and neck, there are no hypermetabolic lesions worrisome for malignancy. There are no hypermetabolic mucosal lesions, and there are no pathologically enlarged or hypermetabolic lymph nodes.     In the chest, there is mediastinal lymphadenopathy e.g. subcarinal node measuring 1.0 cm with SUV max 28 (image 70) and left upper paratracheal node measuring 0.6 cm with SUV max 12 (image 41).     Small left pleural  effusion.  No concerning pulmonary nodules or masses.     In the abdomen and pelvis, there is marked diffuse stomach wall thickening with intense uptake, SUV max 42.  Large wanda conglomerate in the mid abdomen with SUV max 36 (axial fused image 125), difficult to delineate from surrounding bowel and vessels CT.  Additional scattered hypermetabolic lymphadenopathy involving retrocrural nodes, mesenteric nodes, and omental nodules.     There is physiologic tracer distribution within the abdominal organs and excretion into the genitourinary system including probable pooling of excreted tracer within the penile urethra.     The spleen has normal uptake and is nonenlarged at 5 cm in craniocaudal dimension.     In the bones, there are no hypermetabolic lesions worrisome for malignancy.     In the extremities, there are no hypermetabolic lesions worrisome for malignancy.     Incidental CT findings: Right chest port with catheter tip in the right atrium.  Multi-vessel coronary calcifications.     Impression:     1.  In this patient with lymphoma, there is hypermetabolic lymphadenopathy above and below the diaphragm, well as an infiltrative stomach mass consistent with extranodal disease.  Significant lymphadenopathy below the diaphragm is difficult to delineate without intravenous contrast, and bulky disease is not excluded.       3/28/23 ECHO    Normal central venous pressure (3 mmHg).  The estimated PA systolic pressure is 40 mmHg.  The left ventricle is normal in size with normal systolic function.  The estimated ejection fraction is 60%.  Grade I left ventricular diastolic dysfunction.  Normal right ventricular size with normal right ventricular systolic function.  Mild to moderate tricuspid regurgitation.  Small pericardial effusion.      3/31/23 CSF cytology: Negative for malignant cells  Lab Results   Component Value Date    WBC 5.92 05/11/2023    HGB 8.1 (L) 05/11/2023    HCT 24.6 (L) 05/11/2023    MCV 74 (L)  05/11/2023     05/11/2023       CMP  Sodium   Date Value Ref Range Status   05/11/2023 131 (L) 136 - 145 mmol/L Final     Potassium   Date Value Ref Range Status   05/11/2023 3.8 3.5 - 5.1 mmol/L Final     Chloride   Date Value Ref Range Status   05/11/2023 102 95 - 110 mmol/L Final     CO2   Date Value Ref Range Status   05/11/2023 24 23 - 29 mmol/L Final     Glucose   Date Value Ref Range Status   05/11/2023 60 (L) 70 - 110 mg/dL Final     BUN   Date Value Ref Range Status   05/11/2023 8 8 - 23 mg/dL Final     Creatinine   Date Value Ref Range Status   05/11/2023 0.6 0.5 - 1.4 mg/dL Final     Calcium   Date Value Ref Range Status   05/11/2023 8.9 8.7 - 10.5 mg/dL Final     Total Protein   Date Value Ref Range Status   05/11/2023 6.2 6.0 - 8.4 g/dL Final     Albumin   Date Value Ref Range Status   05/11/2023 3.4 (L) 3.5 - 5.2 g/dL Final     Total Bilirubin   Date Value Ref Range Status   05/11/2023 0.3 0.1 - 1.0 mg/dL Final     Comment:     For infants and newborns, interpretation of results should be based  on gestational age, weight and in agreement with clinical  observations.    Premature Infant recommended reference ranges:  Up to 24 hours.............<8.0 mg/dL  Up to 48 hours............<12.0 mg/dL  3-5 days..................<15.0 mg/dL  6-29 days.................<15.0 mg/dL       Alkaline Phosphatase   Date Value Ref Range Status   05/11/2023 71 55 - 135 U/L Final     AST   Date Value Ref Range Status   05/11/2023 18 10 - 40 U/L Final     ALT   Date Value Ref Range Status   05/11/2023 13 10 - 44 U/L Final     Anion Gap   Date Value Ref Range Status   05/11/2023 5 (L) 8 - 16 mmol/L Final     eGFR   Date Value Ref Range Status   05/11/2023 >60.0 >60 mL/min/1.73 m^2 Final     1. Diffuse large B-cell lymphoma of lymph nodes of multiple regions  NM PET CT Routine FDG    Rapid BMT CBC with Diff    Comprehensive Metabolic Panel    Lactate Dehydrogenase    URIC ACID    DISCONTINUED: acetaminophen tablet 650  mg    DISCONTINUED: diphenhydrAMINE (BENADRYL) 50 mg in NS 50 mL IVPB    DISCONTINUED: famotidine (PF) injection 20 mg    DISCONTINUED: riTUXimab-pvvr (RUXIENCE) 375 mg/m2 = 593 mg in sodium chloride 0.9% 593 mL infusion (conc: 1 mg/mL)    DISCONTINUED: meperidine injection 25 mg    DISCONTINUED: palonosetron (ALOXI) 0.25 mg with Dexamethasone (DECADRON) 12 mg in NS 50 mL IVPB    DISCONTINUED: vinCRIStine (ONCOVIN) 2 mg in sodium chloride 0.9% 52 mL chemo infusion    DISCONTINUED: DOXOrubicin chemo injection 80 mg    DISCONTINUED: cycloPHOSphamide 750 mg/m2 = 1,180 mg in sodium chloride 0.9% 255.9 mL chemo infusion    DISCONTINUED: sodium chloride 0.9% 250 mL flush bag    DISCONTINUED: sodium chloride 0.9% flush 10 mL    DISCONTINUED: heparin, porcine (PF) 100 unit/mL injection flush 500 Units    DISCONTINUED: alteplase injection 2 mg      2. Anemia of unknown etiology        3. Hyperlipidemia, unspecified hyperlipidemia type        4. Primary hypertension        5. CINV (chemotherapy-induced nausea and vomiting)        6. Chronic fatigue        7. Abdominal pain, unspecified abdominal location        8. Severe malnutrition                Assessment:    1. DLBCL, non-GC type of multiple sites  2. DLBCL of stomach  3. Fatigue  4. Microcytic anemia  5. Htn, primary  6. HLD  7.GERD  8. Cancer associated pain  9. Constipation  10. Weight loss  11. Malnutrition  12. Hypokalemia  13. hyponatremia      Plan:    1,2: I discussed the diagnosis, prognosis of diffuse large B cell lymphoma. I explained that the treatment is with multi-agent chemotherapy. Staging with PETCT, BM biopsy.  FISH pending. Ki 67 high. He will have CBC, CMP, LDH, uric acid, G6PD, HBV, HIV serology checked. He will start allopurinol 300mg daily to prevent tumor lysis syndrome.  PET CT  on 3/23/23 demonstrated hypermetabolic lymphadenopathy above and below the diaphragm, well as an infiltrative stomach mass consistent with extranodal disease. ECHO showed  normal LVEF on 3/28/23.   NCCN IPI score 6, suggesting high risk disease, with estimated 5 year PFS of 30% and estimated 5 year OS of 33%.   Cycle 1 chemotherapy was in-patient, on 3/29/23. Rituximab was omitted from cycle 1 due to risk of GI perforation. IT MTX administered with cycle 1 CHOP as CNS IPI was intermediate/ high. No malignant cells identified in CSF on 3/31/23.   He will receive cycle 3 today. Will plan for restaging PET CT prior next treatment         4. TIBC low, ferritin normal,. Saturated iron 12% on 2/19/23.  hemoglobin electrophoresis showed normal pattern on 3/8/23.     5,6: He follows with     7. He is on omeprazole BID    8. He is on morphine 15mg liquid by mouth as needed, q 6hour. He is aware of the risks associated with morphine, including drowsiness, risk of falls, and constipation. He uis aware he cannot drive if he is using morphine.      9. He has poor oral intake. He has bowel sounds on examination. He will try miralax.     10, 11. Secondary to poor oral intake. He is on liquid diet. Encouraged use of ENSURE three times daily.       12. He will take oral potassium. Taking potassium 20 meq TID. Reported stomach upset , he will reduce to 1 tab a day and dietary adjustments.  Today's K+ wnl      13. Mild, encouraged increased hydration         BMT Chart Routing  Urgent    Follow up with physician .  visit on 06/01/23 prior therapy   Follow up with BRIAN    Provider visit type    Infusion scheduling note    Injection scheduling note RCHOP on 06/01, GCSF on 06/02   Labs   Scheduling:  Preferred lab:  Lab interval:  Cbc,cmp, LDH, uric acid in 3 weeks on 06/01/23   Imaging   PET CT week of 05/29 prior next therapy   Pharmacy appointment    Other referrals         Advance Care Planning     Date: 05/11/2023    We previously had discussions regarding his underlying diagnosis, natural history, prognosis, and treatment options.  He was interested in pursuing any and all treatment  options available to him, including current chemo therapy.   He remains interested in pursuing all treatment needed.  Will continue chemo therapy monitoring.  Total time of this visit was 30 minutes, including time spent face to face with patient and also in preparing for today's visit for MDM and documentation. (Medical Decision Making, including consideration of possible diagnoses, management options, complex medical record review, review of diagnostic tests and information, consideration and discussion of significant complications based on comorbidities, and discussion with providers involved with the care of the patient). Greater than 50% was spent face to face with the patient counseling and coordinating care.    Nicole Dia NP  Hematology/Oncology/BMT

## 2023-05-12 ENCOUNTER — INFUSION (OUTPATIENT)
Dept: INFUSION THERAPY | Facility: HOSPITAL | Age: 68
End: 2023-05-12
Payer: MEDICARE

## 2023-05-12 DIAGNOSIS — C83.38 DIFFUSE LARGE B-CELL LYMPHOMA OF LYMPH NODES OF MULTIPLE REGIONS: Primary | ICD-10-CM

## 2023-05-12 PROCEDURE — 96372 THER/PROPH/DIAG INJ SC/IM: CPT | Mod: HCNC

## 2023-05-12 PROCEDURE — 63600175 PHARM REV CODE 636 W HCPCS: Mod: JZ,JG,HCNC | Performed by: NURSE PRACTITIONER

## 2023-05-12 RX ADMIN — PEGFILGRASTIM-CBQV 6 MG: 6 INJECTION, SOLUTION SUBCUTANEOUS at 09:05

## 2023-05-14 DIAGNOSIS — C83.38 DIFFUSE LARGE B-CELL LYMPHOMA OF LYMPH NODES OF MULTIPLE REGIONS: ICD-10-CM

## 2023-05-15 RX ORDER — ALLOPURINOL 100 MG/1
TABLET ORAL
Qty: 90 TABLET | Refills: 0 | Status: SHIPPED | OUTPATIENT
Start: 2023-05-15 | End: 2023-06-11

## 2023-05-16 ENCOUNTER — TELEPHONE (OUTPATIENT)
Dept: INTERNAL MEDICINE | Facility: CLINIC | Age: 68
End: 2023-05-16
Payer: MEDICARE

## 2023-05-16 NOTE — TELEPHONE ENCOUNTER
----- Message from Sara Maddox sent at 5/15/2023  3:56 PM CDT -----  Contact: [phone]/Shanita/wife  Patient is calling for Medical Advice regarding: Patient would like to know if he still need to see Dr. Abbott on 5/30 ,  because he is seeing the oncologist? please call and advise.

## 2023-05-30 ENCOUNTER — HOSPITAL ENCOUNTER (OUTPATIENT)
Dept: RADIOLOGY | Facility: HOSPITAL | Age: 68
Discharge: HOME | End: 2023-05-30
Attending: NURSE PRACTITIONER
Payer: MEDICARE

## 2023-05-30 DIAGNOSIS — C83.38 DIFFUSE LARGE B-CELL LYMPHOMA OF LYMPH NODES OF MULTIPLE REGIONS: ICD-10-CM

## 2023-05-30 PROCEDURE — 78815 NM PET CT ROUTINE: ICD-10-PCS | Mod: 26,PS,HCNC, | Performed by: STUDENT IN AN ORGANIZED HEALTH CARE EDUCATION/TRAINING PROGRAM

## 2023-05-30 PROCEDURE — 25500020 PHARM REV CODE 255: Mod: HCNC | Performed by: NURSE PRACTITIONER

## 2023-05-30 PROCEDURE — 78815 PET IMAGE W/CT SKULL-THIGH: CPT | Mod: 26,PS,HCNC, | Performed by: STUDENT IN AN ORGANIZED HEALTH CARE EDUCATION/TRAINING PROGRAM

## 2023-05-30 PROCEDURE — A9552 F18 FDG: HCPCS | Mod: HCNC

## 2023-05-30 PROCEDURE — A9698 NON-RAD CONTRAST MATERIALNOC: HCPCS | Mod: HCNC | Performed by: NURSE PRACTITIONER

## 2023-06-01 ENCOUNTER — OFFICE VISIT (OUTPATIENT)
Dept: HEMATOLOGY/ONCOLOGY | Facility: CLINIC | Age: 68
End: 2023-06-01
Payer: MEDICARE

## 2023-06-01 ENCOUNTER — INFUSION (OUTPATIENT)
Dept: INFUSION THERAPY | Facility: HOSPITAL | Age: 68
End: 2023-06-01
Payer: MEDICARE

## 2023-06-01 VITALS
SYSTOLIC BLOOD PRESSURE: 114 MMHG | WEIGHT: 119.63 LBS | RESPIRATION RATE: 18 BRPM | HEIGHT: 66 IN | BODY MASS INDEX: 19.22 KG/M2 | HEART RATE: 92 BPM | DIASTOLIC BLOOD PRESSURE: 70 MMHG | TEMPERATURE: 98 F | OXYGEN SATURATION: 98 %

## 2023-06-01 VITALS
WEIGHT: 119.63 LBS | SYSTOLIC BLOOD PRESSURE: 107 MMHG | DIASTOLIC BLOOD PRESSURE: 55 MMHG | BODY MASS INDEX: 19.22 KG/M2 | HEART RATE: 69 BPM | TEMPERATURE: 98 F | OXYGEN SATURATION: 98 % | HEIGHT: 66 IN | RESPIRATION RATE: 20 BRPM

## 2023-06-01 DIAGNOSIS — K21.9 GASTROESOPHAGEAL REFLUX DISEASE, UNSPECIFIED WHETHER ESOPHAGITIS PRESENT: ICD-10-CM

## 2023-06-01 DIAGNOSIS — R53.82 CHRONIC FATIGUE: ICD-10-CM

## 2023-06-01 DIAGNOSIS — C83.38 DIFFUSE LARGE B-CELL LYMPHOMA OF LYMPH NODES OF MULTIPLE REGIONS: Primary | ICD-10-CM

## 2023-06-01 DIAGNOSIS — D84.9 IMMUNOCOMPROMISED STATE: ICD-10-CM

## 2023-06-01 DIAGNOSIS — E78.5 HYPERLIPIDEMIA, UNSPECIFIED HYPERLIPIDEMIA TYPE: ICD-10-CM

## 2023-06-01 DIAGNOSIS — I10 PRIMARY HYPERTENSION: ICD-10-CM

## 2023-06-01 PROCEDURE — 63600175 PHARM REV CODE 636 W HCPCS: Mod: JG,HCNC | Performed by: NURSE PRACTITIONER

## 2023-06-01 PROCEDURE — 1101F PT FALLS ASSESS-DOCD LE1/YR: CPT | Mod: HCNC,CPTII,S$GLB, | Performed by: NURSE PRACTITIONER

## 2023-06-01 PROCEDURE — 63600175 PHARM REV CODE 636 W HCPCS: Mod: HCNC | Performed by: INTERNAL MEDICINE

## 2023-06-01 PROCEDURE — 3008F PR BODY MASS INDEX (BMI) DOCUMENTED: ICD-10-PCS | Mod: HCNC,CPTII,S$GLB, | Performed by: NURSE PRACTITIONER

## 2023-06-01 PROCEDURE — 1126F PR PAIN SEVERITY QUANTIFIED, NO PAIN PRESENT: ICD-10-PCS | Mod: HCNC,CPTII,S$GLB, | Performed by: NURSE PRACTITIONER

## 2023-06-01 PROCEDURE — 96367 TX/PROPH/DG ADDL SEQ IV INF: CPT | Mod: HCNC

## 2023-06-01 PROCEDURE — 4010F PR ACE/ARB THEARPY RXD/TAKEN: ICD-10-PCS | Mod: HCNC,CPTII,S$GLB, | Performed by: NURSE PRACTITIONER

## 2023-06-01 PROCEDURE — 99999 PR PBB SHADOW E&M-EST. PATIENT-LVL V: ICD-10-PCS | Mod: PBBFAC,HCNC,, | Performed by: NURSE PRACTITIONER

## 2023-06-01 PROCEDURE — 3288F FALL RISK ASSESSMENT DOCD: CPT | Mod: HCNC,CPTII,S$GLB, | Performed by: NURSE PRACTITIONER

## 2023-06-01 PROCEDURE — 25000003 PHARM REV CODE 250: Mod: HCNC | Performed by: NURSE PRACTITIONER

## 2023-06-01 PROCEDURE — 96415 CHEMO IV INFUSION ADDL HR: CPT | Mod: HCNC

## 2023-06-01 PROCEDURE — 1159F MED LIST DOCD IN RCRD: CPT | Mod: HCNC,CPTII,S$GLB, | Performed by: NURSE PRACTITIONER

## 2023-06-01 PROCEDURE — 3078F DIAST BP <80 MM HG: CPT | Mod: HCNC,CPTII,S$GLB, | Performed by: NURSE PRACTITIONER

## 2023-06-01 PROCEDURE — 1126F AMNT PAIN NOTED NONE PRSNT: CPT | Mod: HCNC,CPTII,S$GLB, | Performed by: NURSE PRACTITIONER

## 2023-06-01 PROCEDURE — 3078F PR MOST RECENT DIASTOLIC BLOOD PRESSURE < 80 MM HG: ICD-10-PCS | Mod: HCNC,CPTII,S$GLB, | Performed by: NURSE PRACTITIONER

## 2023-06-01 PROCEDURE — 96411 CHEMO IV PUSH ADDL DRUG: CPT | Mod: HCNC

## 2023-06-01 PROCEDURE — 4010F ACE/ARB THERAPY RXD/TAKEN: CPT | Mod: HCNC,CPTII,S$GLB, | Performed by: NURSE PRACTITIONER

## 2023-06-01 PROCEDURE — 99999 PR PBB SHADOW E&M-EST. PATIENT-LVL V: CPT | Mod: PBBFAC,HCNC,, | Performed by: NURSE PRACTITIONER

## 2023-06-01 PROCEDURE — 3074F PR MOST RECENT SYSTOLIC BLOOD PRESSURE < 130 MM HG: ICD-10-PCS | Mod: HCNC,CPTII,S$GLB, | Performed by: NURSE PRACTITIONER

## 2023-06-01 PROCEDURE — 1101F PR PT FALLS ASSESS DOC 0-1 FALLS W/OUT INJ PAST YR: ICD-10-PCS | Mod: HCNC,CPTII,S$GLB, | Performed by: NURSE PRACTITIONER

## 2023-06-01 PROCEDURE — 3008F BODY MASS INDEX DOCD: CPT | Mod: HCNC,CPTII,S$GLB, | Performed by: NURSE PRACTITIONER

## 2023-06-01 PROCEDURE — 25000003 PHARM REV CODE 250: Mod: HCNC | Performed by: INTERNAL MEDICINE

## 2023-06-01 PROCEDURE — 99214 OFFICE O/P EST MOD 30 MIN: CPT | Mod: HCNC,S$GLB,, | Performed by: NURSE PRACTITIONER

## 2023-06-01 PROCEDURE — 3288F PR FALLS RISK ASSESSMENT DOCUMENTED: ICD-10-PCS | Mod: HCNC,CPTII,S$GLB, | Performed by: NURSE PRACTITIONER

## 2023-06-01 PROCEDURE — 1159F PR MEDICATION LIST DOCUMENTED IN MEDICAL RECORD: ICD-10-PCS | Mod: HCNC,CPTII,S$GLB, | Performed by: NURSE PRACTITIONER

## 2023-06-01 PROCEDURE — 96417 CHEMO IV INFUS EACH ADDL SEQ: CPT | Mod: HCNC

## 2023-06-01 PROCEDURE — 1160F PR REVIEW ALL MEDS BY PRESCRIBER/CLIN PHARMACIST DOCUMENTED: ICD-10-PCS | Mod: HCNC,CPTII,S$GLB, | Performed by: NURSE PRACTITIONER

## 2023-06-01 PROCEDURE — A4216 STERILE WATER/SALINE, 10 ML: HCPCS | Mod: HCNC | Performed by: NURSE PRACTITIONER

## 2023-06-01 PROCEDURE — 99214 PR OFFICE/OUTPT VISIT, EST, LEVL IV, 30-39 MIN: ICD-10-PCS | Mod: HCNC,S$GLB,, | Performed by: NURSE PRACTITIONER

## 2023-06-01 PROCEDURE — 1160F RVW MEDS BY RX/DR IN RCRD: CPT | Mod: HCNC,CPTII,S$GLB, | Performed by: NURSE PRACTITIONER

## 2023-06-01 PROCEDURE — 96413 CHEMO IV INFUSION 1 HR: CPT | Mod: HCNC

## 2023-06-01 PROCEDURE — 96375 TX/PRO/DX INJ NEW DRUG ADDON: CPT | Mod: HCNC

## 2023-06-01 PROCEDURE — 3074F SYST BP LT 130 MM HG: CPT | Mod: HCNC,CPTII,S$GLB, | Performed by: NURSE PRACTITIONER

## 2023-06-01 RX ORDER — SODIUM CHLORIDE 0.9 % (FLUSH) 0.9 %
10 SYRINGE (ML) INJECTION
Status: DISCONTINUED | OUTPATIENT
Start: 2023-06-01 | End: 2023-06-01 | Stop reason: HOSPADM

## 2023-06-01 RX ORDER — ACETAMINOPHEN 325 MG/1
650 TABLET ORAL
Status: COMPLETED | OUTPATIENT
Start: 2023-06-01 | End: 2023-06-01

## 2023-06-01 RX ORDER — HEPARIN 100 UNIT/ML
500 SYRINGE INTRAVENOUS
Status: CANCELLED | OUTPATIENT
Start: 2023-06-01

## 2023-06-01 RX ORDER — SODIUM CHLORIDE 0.9 % (FLUSH) 0.9 %
10 SYRINGE (ML) INJECTION
Status: CANCELLED | OUTPATIENT
Start: 2023-06-01

## 2023-06-01 RX ORDER — FAMOTIDINE 10 MG/ML
20 INJECTION INTRAVENOUS
Status: CANCELLED | OUTPATIENT
Start: 2023-06-01

## 2023-06-01 RX ORDER — FAMOTIDINE 10 MG/ML
20 INJECTION INTRAVENOUS
Status: COMPLETED | OUTPATIENT
Start: 2023-06-01 | End: 2023-06-01

## 2023-06-01 RX ORDER — DOXORUBICIN HYDROCHLORIDE 2 MG/ML
50 INJECTION, SOLUTION INTRAVENOUS
Status: COMPLETED | OUTPATIENT
Start: 2023-06-01 | End: 2023-06-01

## 2023-06-01 RX ORDER — MEPERIDINE HYDROCHLORIDE 50 MG/ML
25 INJECTION INTRAMUSCULAR; INTRAVENOUS; SUBCUTANEOUS
Status: CANCELLED | OUTPATIENT
Start: 2023-06-01 | End: 2023-06-02

## 2023-06-01 RX ORDER — MEPERIDINE HYDROCHLORIDE 50 MG/ML
25 INJECTION INTRAMUSCULAR; INTRAVENOUS; SUBCUTANEOUS
Status: DISCONTINUED | OUTPATIENT
Start: 2023-06-01 | End: 2023-06-01 | Stop reason: HOSPADM

## 2023-06-01 RX ORDER — DOXORUBICIN HYDROCHLORIDE 2 MG/ML
50 INJECTION, SOLUTION INTRAVENOUS
Status: CANCELLED | OUTPATIENT
Start: 2023-06-01

## 2023-06-01 RX ORDER — HEPARIN 100 UNIT/ML
500 SYRINGE INTRAVENOUS
Status: DISCONTINUED | OUTPATIENT
Start: 2023-06-01 | End: 2023-06-01 | Stop reason: HOSPADM

## 2023-06-01 RX ORDER — ACETAMINOPHEN 325 MG/1
650 TABLET ORAL
Status: CANCELLED | OUTPATIENT
Start: 2023-06-01

## 2023-06-01 RX ADMIN — ACETAMINOPHEN 650 MG: 325 TABLET ORAL at 09:06

## 2023-06-01 RX ADMIN — DEXAMETHASONE SODIUM PHOSPHATE 0.25 MG: 4 INJECTION, SOLUTION INTRA-ARTICULAR; INTRALESIONAL; INTRAMUSCULAR; INTRAVENOUS; SOFT TISSUE at 02:06

## 2023-06-01 RX ADMIN — SODIUM CHLORIDE 593 MG: 9 INJECTION, SOLUTION INTRAVENOUS at 10:06

## 2023-06-01 RX ADMIN — DOXORUBICIN HYDROCHLORIDE 80 MG: 2 INJECTION, SOLUTION INTRAVENOUS at 02:06

## 2023-06-01 RX ADMIN — CYCLOPHOSPHAMIDE 1180 MG: 200 INJECTION, SOLUTION INTRAVENOUS at 03:06

## 2023-06-01 RX ADMIN — VINCRISTINE SULFATE 2 MG: 1 INJECTION, SOLUTION INTRAVENOUS at 02:06

## 2023-06-01 RX ADMIN — SODIUM CHLORIDE: 9 INJECTION, SOLUTION INTRAVENOUS at 09:06

## 2023-06-01 RX ADMIN — HEPARIN 500 UNITS: 100 SYRINGE at 04:06

## 2023-06-01 RX ADMIN — FAMOTIDINE 20 MG: 10 INJECTION INTRAVENOUS at 09:06

## 2023-06-01 RX ADMIN — Medication 10 ML: at 04:06

## 2023-06-01 RX ADMIN — DIPHENHYDRAMINE HYDROCHLORIDE 50 MG: 50 INJECTION, SOLUTION INTRAMUSCULAR; INTRAVENOUS at 09:06

## 2023-06-01 NOTE — NURSING
PT tolerated RCHOP treatment well. No adverse reaction noted. Pt education reinforced on RCHOP side effects, what to expect and when to contact the doctor. Pt verbalized understanding. PAC flushed with heparin and de-accessed per protocol.

## 2023-06-01 NOTE — PROGRESS NOTES
CC: DLBCL, hematology follow up    HPI: , 68, M, is here for hematology follow up. He has diffuse large B cell lymphoma. He has GERD, HLD, HTn. He presented to outside hospital in 2023 with epigastric pain.  He was found to have an esophageal mass and was [facility]. He underwent endoscopy with biopsy of GI mass . He remained stable on liquid diet.     He has lost ~30 lbs in the past 6 months. Denies fevers, night sweats, upper/lower GI bleeding.   Interval History: He is here for follow up prior to cycle 4. He feels better. Pain is improved. His constipation has resolved. Weight remains stable. Mid treatment PET CT with positive response to treatment. Denies fever, chills, black stools, hematochezia, heron.     Review of patient's allergies indicates:   Allergen Reactions    Amoxicillin Hives and Rash       Current Outpatient Medications   Medication Sig    acyclovir (ZOVIRAX) 400 MG tablet Take 1 tablet (400 mg total) by mouth 2 (two) times daily.    allopurinoL (ZYLOPRIM) 100 MG tablet Take 3 tablets by mouth once daily    azelastine (ASTELIN) 137 mcg (0.1 %) nasal spray 1 spray (137 mcg total) by Nasal route 2 (two) times daily.    HYDROcodone-acetaminophen (LORTAB ELIXIR)  mg/15 mL Soln Take 15 mLs by mouth every 4 (four) hours as needed (pain).    KENALOG 40 mg/mL injection     LIDOcaine-prilocaine (EMLA) cream Apply topically as needed.    lisinopriL (PRINIVIL,ZESTRIL) 40 MG tablet Take 1 tablet (40 mg total) by mouth once daily.    magnesium hydroxide 400 mg/5 ml (MILK OF MAGNESIA) 400 mg/5 mL Susp Take 30 mLs (2,400 mg total) by mouth daily as needed.    magnesium oxide (MAG-OX) 400 mg (241.3 mg magnesium) tablet Take 1 tablet (400 mg total) by mouth once daily.    morphine 10 mg/5 mL solution SMARTSI.5 Milliliter(s) By Mouth Every 6 Hours PRN    omeprazole (PRILOSEC) 40 MG capsule Take 1 capsule (40 mg total) by mouth 2 (two) times daily before meals.     ondansetron (ZOFRAN) 8 MG tablet Take 1 tablet (8 mg total) by mouth every 8 (eight) hours as needed for Nausea.    potassium chloride SA (K-DUR,KLOR-CON) 20 MEQ tablet Take 1 tablet (20 mEq total) by mouth 3 (three) times daily.    predniSONE (DELTASONE) 50 MG Tab Take 2 tablets (100 mg total) by mouth As instructed. Take 100 mg daily in the morning for 5 days once every 21 days    predniSONE (DELTASONE) 50 MG Tab Take 2 tablets (100 mg total) by mouth once daily. Take on days 2-5 of your chemotherapy cycles.    promethazine (PHENERGAN) 12.5 MG Tab Take 2 tablets (25 mg total) by mouth every 6 (six) hours as needed (Severe Nausea).    sildenafiL (VIAGRA) 100 MG tablet Take 1 tablet (100 mg total) by mouth as needed for Erectile Dysfunction.    simethicone (MYLICON) 80 MG chewable tablet Chew and swallow 1 tablet (80 mg total) by mouth 2 hours after meals and at bedtime.     No current facility-administered medications for this visit.     Facility-Administered Medications Ordered in Other Visits   Medication    alteplase injection 2 mg    cycloPHOSphamide 750 mg/m2 = 1,180 mg in sodium chloride 0.9% 290.9 mL chemo infusion    DOXOrubicin chemo injection 80 mg    heparin, porcine (PF) 100 unit/mL injection flush 500 Units    meperidine injection 25 mg    palonosetron 0.25mg/dexAMETHasone 12mg in NS IVPB 0.25 mg 50 mL    sodium chloride 0.9% flush 10 mL    vinCRIStine (ONCOVIN) 2 mg in sodium chloride 0.9% 65 mL chemo infusion          Review of Systems   Constitutional:  Positive for malaise/fatigue (improving) and weight loss (remains stable now). Negative for chills, diaphoresis and fever.   HENT:  Negative for congestion, ear pain, hearing loss, nosebleeds, sinus pain and tinnitus.    Eyes:  Negative for blurred vision, double vision, photophobia, pain and discharge.   Respiratory:  Negative for cough, hemoptysis and sputum production.    Cardiovascular:  Negative for palpitations, orthopnea, claudication and  leg swelling.   Gastrointestinal:  Positive for abdominal pain (resolved), constipation (resolved), heartburn (improved) and nausea (occasionally). Negative for blood in stool, diarrhea, melena and vomiting.   Genitourinary:  Negative for dysuria, frequency, hematuria and urgency.   Musculoskeletal:  Negative for back pain, myalgias and neck pain.   Neurological:  Negative for tingling, tremors, focal weakness, weakness and headaches.   Endo/Heme/Allergies:  Negative for environmental allergies.   Psychiatric/Behavioral:  Negative for depression, hallucinations and substance abuse. The patient is not nervous/anxious and does not have insomnia.           Vitals:    06/01/23 0754   BP: 114/70   Pulse: 92   Resp: 18   Temp: 98.2 °F (36.8 °C)       PS: ECOG 2    Physical Exam  Constitutional:       Appearance: Normal appearance.   HENT:      Head: Normocephalic and atraumatic.      Mouth/Throat:      Pharynx: No posterior oropharyngeal erythema.   Eyes:      General: No scleral icterus.  Cardiovascular:      Rate and Rhythm: Normal rate and regular rhythm.      Heart sounds: No murmur heard.  Pulmonary:      Effort: Pulmonary effort is normal. No respiratory distress.      Breath sounds: Normal breath sounds. No rhonchi.   Abdominal:      General: There is no distension.      Palpations: There is no mass.      Tenderness: There is no abdominal tenderness.   Musculoskeletal:         General: No swelling.   Lymphadenopathy:      Cervical: No cervical adenopathy.   Skin:     Coloration: Skin is not jaundiced.   Neurological:      General: No focal deficit present.      Mental Status: He is alert and oriented to person, place, and time.      Cranial Nerves: No cranial nerve deficit.        2/18/23 CT abdomen pelvis with contrast    COMPARISON:  None     FINDINGS:  Abdomen:     - Lung bases: There is focal emphysematous change in the medial left lung base.  Lung bases contain mild areas of peripheral chronic atelectasis.  A  focus of suspected atelectasis simulates a nodular opacity at the right dome of the diaphragm.     There is distal esophageal distension with fluid secondary to and infiltrative type mass involving the visualized cardiac portion of the stomach extending to the GE junction concerning for malignancy.  There is a mantle of lobular adenopathy surrounding the proximal abdominal aorta appearing to involve the retrocrural lymph nodes and adjacent periaortic and pericaval lymph nodes to the level of the left renal vein with the renal a vein somewhat displaced.  No visible renal vein invasion/thrombosis.  Smaller shotty type lymph nodes are seen caudal to the renal veins in the retroperitoneum.     - Liver: The liver appears homogeneous and not significantly enlarged.     - Gallbladder: There is no CT evidence of cholecystitis or cholelithiasis.     - Bile Ducts: No evidence of intra or extra hepatic biliary ductal dilation.     - Spleen: Negative.     - Kidneys: The kidneys enhance symmetrically and homogeneously.     - Adrenals: Unremarkable.     - Pancreas: Pancreas is displaced anteriorly by the retroperitoneal adenopathy with an ill-defined mass around 3 cm in diameter in the body appearing contiguous with the pancreatic parenchyma and similar in density to the retroperitoneal adenopathy consistent with involvement/invasion.  Additionally there is no fat plane margin between the posterior pancreatic body and the lobular retroperitoneal mantle of adenopathy.  Heterogeneous enhancement of the body of the pancreas in this region and poor visualization of the splenic artery and vein within the body suggests vessel encasement/involvement.  Partial thrombosis of portions of these vessels is suspected.     -Vascular: No abdominal aortic aneurysm or significant atherosclerosis.     - Abdominal wall:  There is a small right inguinal hernia.     Small bowel and colon: There is no small bowel distension.  There is no mesenteric  convincing adenopathy.  Shotty lymph nodes are noted.  No extraluminal free air or free fluid is seen in the abdomen or pelvis.  Appendix is not isolated as a separate structure however there is no evidence of appendicitis.  Few scattered diverticuli are seen distally in the colon.  No diverticulitis.     Pelvis:     There is no pelvic mass, adenopathy, or free fluid.  Bladder is unremarkable noting mild thickening of the mucosa can be explained by under distension.  The prostate is borderline mildly prominent.     Bones:  No acute osseous abnormality and no suspicious lytic or blastic lesion.     Impression:     Bulky infiltrative type mass compatible with malignancy involving the gastric cardiac portion extending to the GE junction with esophageal high-grade obstruction .  Further evaluation can be made with CT chest.     Bulky adenopathy in the retroperitoneum in the periaortic and pericaval region to the level of the left renal vein also appearing to involve the dorsal pancreatic body and also appearing to involve and obstruct the splenic artery and vein as described.     Incidental additional nonacute findings are discussed in the body of the report.       2/20/23 upper GI EUS     --One extrinsic moderate stenosis was found at the gastroesophageal junction. The stenosis was traversed.      --  A large, fungating and infiltrative mass with oozing bleeding was found in the cardia, in the gastric fundus and in the gastric body.        --Biopsies were taken with a cold forceps for histology.        --The examined duodenum was normal.          ENDOSONOGRAPHIC FINDING:          -- The esophagus and adjacent structures were visualized endosonographically.      --  A hypoechoic mass was identified endosonographically in the cardia of the stomach and in the fundus of the stomach (seen from the GE  junction, as EUS scope could not traverse the area of extrinsic        stenosis). The mass measured 52 mm by 62 mm in  maximal cross-sectional diameter.     Impression:            - Extrinsic narrowing of the esophagus.                          - Gastric tumor in the cardia, in the gastric fundus and in the gastric body. Biopsied.                          - Normal examined duodenum.         1. GASTRIC MASS BIOPSY:            -  Diffuse large B-cell lymphoma, non-germinal center subtype          -  High proliferation index (99% by Ki-67 immunostain)          -  Negative for CD30 co-expression by immunostain          -  TREY JANA positive for EBV-encoded small mRNAs in  few scattered   bystander lymphocytes          -   PENDING  (send-out): MYC tech only immunostain and Double/Triple Hit Lymphoma FISH panel with results to be reported in a separate supplemental and final diagnosis may be further classified             at that time   COMMENT: No flow cytometric analysis was performed on this specimen    Low and high power examination reveal minute irregular fragments of gastric tissue with an atypical diffuse submucosal infiltrate with focal crush artifacts and composed predominantly of large lymphoma cells with occasional   prominent central nucleoli and focal areas with increased scattered mature eosinophils and rarer scattered neutrophils, histiocytes, small mature lymphocytes, and plasma cells.   AE1/AE3, Synaptophysin, and Chromogranin immunostains were performed with adequate controls on block 1A by the referring surgical pathologist who   initially reviewed this biopsy and are negative for carcinoma or infiltrating neuroendocrine cells.   Immunohistochemical study with stains for CD3, CD5, BCL-2, CD20, CD23, Cyclin D1, CD10, BCL-6, MUM-1, CD30, and Ki-67 and EBV-encoded small mRNAs in-situ hybridization (TREY JANA) were performed on block 1A with appropriate controls   for increased sensitivity and cellular localization within the cells of interest.  Unstained slides of block 1A were sent out to [facility]   appbackr for MYC  tech only immunostain with results to be interpreted at [facility] and Double/Triple Hit Lymphoma FISH panel.   The large lymphoma cells are positive for CD20 and MUM-1 (nuclear) and negative for AE1/AE3, Synaptophysin, Chromogranin, CD3, CD5, CD23, Cyclin D1   (nuclear), CD10 (dim <30%), BCL-6 (strong nuclear <30%), and CD30. Ki-67 (nuclear) highlights a high proliferation index (99%). TREY JANA stains few scattered small and medium-sized bystander lymphocytes.   AE1/A3 stains mucosal and glandular epithelial cells. Synaptophysin and/or Chromogranin stain a rare subset of normal neuroendocrine cells associated   with the glandular epithelial cells.   CD3, CD5, and BCL-2 co-stain numerous scattered reactive T-cells within the focal non-involved areas of the biopsy. CD5 also appears to stain glandular   epithelial cells and a rare subset of the mucosal epithelial cells. Cyclin D1 (nuclear) stains epithelial and endothelial cells. CD10 stains stromal cells and few scattered neutrophils. Rare scattered medium-sized CD30+ cells (favor   reactive immunoblasts) are identified      3/8/23 hemoglobin electrophoresis: No electrophoretic evidence of abnormal hemoglobin or beta thalassemia. See comment    3/23/23 PET CT      COMPARISON:  CT abdomen pelvis 02/18/2023     FINDINGS:  Quality of the study: Adequate.     Reference values:     Mediastinal blood pool maximum SUV: 1.7     Normal liver background maximum SUV: 1.4     There is lymphadenopathy above below the diaphragm.     In the head and neck, there are no hypermetabolic lesions worrisome for malignancy. There are no hypermetabolic mucosal lesions, and there are no pathologically enlarged or hypermetabolic lymph nodes.     In the chest, there is mediastinal lymphadenopathy e.g. subcarinal node measuring 1.0 cm with SUV max 28 (image 70) and left upper paratracheal node measuring 0.6 cm with SUV max 12 (image 41).     Small left pleural  effusion.  No concerning pulmonary nodules or masses.     In the abdomen and pelvis, there is marked diffuse stomach wall thickening with intense uptake, SUV max 42.  Large wanda conglomerate in the mid abdomen with SUV max 36 (axial fused image 125), difficult to delineate from surrounding bowel and vessels CT.  Additional scattered hypermetabolic lymphadenopathy involving retrocrural nodes, mesenteric nodes, and omental nodules.     There is physiologic tracer distribution within the abdominal organs and excretion into the genitourinary system including probable pooling of excreted tracer within the penile urethra.     The spleen has normal uptake and is nonenlarged at 5 cm in craniocaudal dimension.     In the bones, there are no hypermetabolic lesions worrisome for malignancy.     In the extremities, there are no hypermetabolic lesions worrisome for malignancy.     Incidental CT findings: Right chest port with catheter tip in the right atrium.  Multi-vessel coronary calcifications.     Impression:     1.  In this patient with lymphoma, there is hypermetabolic lymphadenopathy above and below the diaphragm, well as an infiltrative stomach mass consistent with extranodal disease.  Significant lymphadenopathy below the diaphragm is difficult to delineate without intravenous contrast, and bulky disease is not excluded.       3/28/23 ECHO    Normal central venous pressure (3 mmHg).  The estimated PA systolic pressure is 40 mmHg.  The left ventricle is normal in size with normal systolic function.  The estimated ejection fraction is 60%.  Grade I left ventricular diastolic dysfunction.  Normal right ventricular size with normal right ventricular systolic function.  Mild to moderate tricuspid regurgitation.  Small pericardial effusion.      3/31/23 CSF cytology: Negative for malignant cells  Lab Results   Component Value Date    WBC 3.96 06/01/2023    HGB 8.6 (L) 06/01/2023    HCT 27.2 (L) 06/01/2023    MCV 81 (L)  06/01/2023     06/01/2023       CMP  Sodium   Date Value Ref Range Status   06/01/2023 134 (L) 136 - 145 mmol/L Final     Potassium   Date Value Ref Range Status   06/01/2023 3.6 3.5 - 5.1 mmol/L Final     Chloride   Date Value Ref Range Status   06/01/2023 103 95 - 110 mmol/L Final     CO2   Date Value Ref Range Status   06/01/2023 21 (L) 23 - 29 mmol/L Final     Glucose   Date Value Ref Range Status   06/01/2023 71 70 - 110 mg/dL Final     BUN   Date Value Ref Range Status   06/01/2023 13 8 - 23 mg/dL Final     Creatinine   Date Value Ref Range Status   06/01/2023 0.7 0.5 - 1.4 mg/dL Final     Calcium   Date Value Ref Range Status   06/01/2023 8.9 8.7 - 10.5 mg/dL Final     Total Protein   Date Value Ref Range Status   06/01/2023 6.3 6.0 - 8.4 g/dL Final     Albumin   Date Value Ref Range Status   06/01/2023 3.4 (L) 3.5 - 5.2 g/dL Final     Total Bilirubin   Date Value Ref Range Status   06/01/2023 0.3 0.1 - 1.0 mg/dL Final     Comment:     For infants and newborns, interpretation of results should be based  on gestational age, weight and in agreement with clinical  observations.    Premature Infant recommended reference ranges:  Up to 24 hours.............<8.0 mg/dL  Up to 48 hours............<12.0 mg/dL  3-5 days..................<15.0 mg/dL  6-29 days.................<15.0 mg/dL       Alkaline Phosphatase   Date Value Ref Range Status   06/01/2023 74 55 - 135 U/L Final     AST   Date Value Ref Range Status   06/01/2023 22 10 - 40 U/L Final     ALT   Date Value Ref Range Status   06/01/2023 12 10 - 44 U/L Final     Anion Gap   Date Value Ref Range Status   06/01/2023 10 8 - 16 mmol/L Final     eGFR   Date Value Ref Range Status   06/01/2023 >60.0 >60 mL/min/1.73 m^2 Final     1. Diffuse large B-cell lymphoma of lymph nodes of multiple regions  Rapid BMT CBC with Diff    Comprehensive Metabolic Panel    Lactate Dehydrogenase    DISCONTINUED: acetaminophen tablet 650 mg    DISCONTINUED: diphenhydrAMINE  (BENADRYL) 50 mg in NS 50 mL IVPB    DISCONTINUED: famotidine (PF) injection 20 mg    DISCONTINUED: riTUXimab-pvvr (RUXIENCE) 375 mg/m2 = 593 mg in sodium chloride 0.9% 593 mL infusion (conc: 1 mg/mL)    DISCONTINUED: meperidine injection 25 mg    DISCONTINUED: palonosetron (ALOXI) 0.25 mg with Dexamethasone (DECADRON) 12 mg in NS 50 mL IVPB    DISCONTINUED: vinCRIStine (ONCOVIN) 2 mg in sodium chloride 0.9% 52 mL chemo infusion    DISCONTINUED: DOXOrubicin chemo injection 80 mg    DISCONTINUED: cycloPHOSphamide 750 mg/m2 = 1,180 mg in sodium chloride 0.9% 255.9 mL chemo infusion    DISCONTINUED: sodium chloride 0.9% 250 mL flush bag    DISCONTINUED: sodium chloride 0.9% flush 10 mL    DISCONTINUED: heparin, porcine (PF) 100 unit/mL injection flush 500 Units    DISCONTINUED: alteplase injection 2 mg      2. Chronic fatigue        3. Primary hypertension        4. Hyperlipidemia, unspecified hyperlipidemia type        5. Gastroesophageal reflux disease, unspecified whether esophagitis present        6. Immunocompromised state                  Assessment:    1. DLBCL, non-GC type of multiple sites  2. DLBCL of stomach  3. Fatigue  4. Microcytic anemia  5. Htn, primary  6. HLD  7.GERD  8. Cancer associated pain  9. Constipation  10. Weight loss  11. Malnutrition  12. Hypokalemia  13. hyponatremia      Plan:    1,2: I discussed the diagnosis, prognosis of diffuse large B cell lymphoma. I explained that the treatment is with multi-agent chemotherapy. Staging with PETCT, BM biopsy.  FISH pending. Ki 67 high. He will have CBC, CMP, LDH, uric acid, G6PD, HBV, HIV serology checked. He will start allopurinol 300mg daily to prevent tumor lysis syndrome.  PET CT  on 3/23/23 demonstrated hypermetabolic lymphadenopathy above and below the diaphragm, well as an infiltrative stomach mass consistent with extranodal disease. ECHO showed normal LVEF on 3/28/23.   NCCN IPI score 6, suggesting high risk disease, with estimated 5 year  PFS of 30% and estimated 5 year OS of 33%.   Cycle 1 chemotherapy was in-patient, on 3/29/23. Rituximab was omitted from cycle 1 due to risk of GI perforation. IT MTX administered with cycle 1 CHOP as CNS IPI was intermediate/ high. No malignant cells identified in CSF on 3/31/23.   He will receive cycle 4 today. Mid treatment PET CT with positive response to treatment(05/30/23). Patient to get 6 cycles of treatment, insurance approved only 5 cycled. Messaged pre service to f/u        4. TIBC low, ferritin normal,. Saturated iron 12% on 2/19/23.  hemoglobin electrophoresis showed normal pattern on 3/8/23.     5,6: He follows with     7. He is on omeprazole BID    8. He is on morphine 15mg liquid by mouth as needed, q 6hour. He is aware of the risks associated with morphine, including drowsiness, risk of falls, and constipation. He is aware he cannot drive if he is using morphine.  Pain much controlled, not taking pain meds at this time    9. He has poor oral intake. He has bowel sounds on examination. He will try miralax.     10, 11. Secondary to poor oral intake. He is on liquid diet. Encouraged use of ENSURE three times daily.  - Improved food intake     12. He will take oral potassium. Taking potassium 20 meq TID. Reported stomach upset , he will reduce to 1 tab a day and dietary adjustments.  Today's K+ wnl      13. Mild, encouraged increased hydration. Improved from previous labs         BMT Chart Routing      Follow up with physician . /BRIAN on 06/22 prior chemo   Follow up with BRIAN    Provider visit type    Infusion scheduling note    Injection scheduling note Keep scheduled chemo on 06/22&06/23   Labs   Scheduling:  Preferred lab:  Lab interval:  Add cbc, cmp, LDH on 06/22 prior chemo   Imaging    Pharmacy appointment    Other referrals             Advance Care Planning     Date: 06/01/2023   We previously had discussions regarding his underlying diagnosis, natural history, prognosis, and  treatment options.  He was interested in pursuing any and all treatment options available to him, including current chemo therapy.   He remains interested in pursuing all treatment needed.  Will continue chemo therapy monitoring.  Total time of this visit was 30 minutes, including time spent face to face with patient and also in preparing for today's visit for MDM and documentation. (Medical Decision Making, including consideration of possible diagnoses, management options, complex medical record review, review of diagnostic tests and information, consideration and discussion of significant complications based on comorbidities, and discussion with providers involved with the care of the patient). Greater than 50% was spent face to face with the patient counseling and coordinating care.    Nicole Dia NP  Hematology/Oncology/BMT

## 2023-06-02 ENCOUNTER — INFUSION (OUTPATIENT)
Dept: INFUSION THERAPY | Facility: HOSPITAL | Age: 68
End: 2023-06-02
Payer: MEDICARE

## 2023-06-02 DIAGNOSIS — C83.38 DIFFUSE LARGE B-CELL LYMPHOMA OF LYMPH NODES OF MULTIPLE REGIONS: Primary | ICD-10-CM

## 2023-06-02 PROCEDURE — 96372 THER/PROPH/DIAG INJ SC/IM: CPT | Mod: HCNC

## 2023-06-02 PROCEDURE — 63600175 PHARM REV CODE 636 W HCPCS: Mod: JZ,JG,HCNC | Performed by: NURSE PRACTITIONER

## 2023-06-02 RX ADMIN — PEGFILGRASTIM-CBQV 6 MG: 6 INJECTION, SOLUTION SUBCUTANEOUS at 09:06

## 2023-06-02 NOTE — NURSING
Pt here for D2 Udenyca. Administered Udenyca in abdominal tissue. No questions or concerns. Pt ambulated out of unit unassisted.

## 2023-06-07 ENCOUNTER — NURSE TRIAGE (OUTPATIENT)
Dept: ADMINISTRATIVE | Facility: CLINIC | Age: 68
End: 2023-06-07
Payer: MEDICARE

## 2023-06-08 ENCOUNTER — TELEPHONE (OUTPATIENT)
Dept: HEMATOLOGY/ONCOLOGY | Facility: CLINIC | Age: 68
End: 2023-06-08
Payer: MEDICARE

## 2023-06-08 NOTE — TELEPHONE ENCOUNTER
Pt called for c/o body aches and he said that he stopped last chemo 7 days ago and now he said that he has body aches and feels like getting maybe the flu and he is asking if he can take theraflu which is what he usually takes for this situation. He is wondering if their is any contraindication for this medication since on chemo. Pt triaged and care advice is to call Md within 24 hours and he said that he will take tylenol Pm tonight for rest and I will route message to provider in the am. Pt to call back as needed and if starts running fever.                      Reason for Disposition   Diabetes mellitus or weak immune system (e.g., HIV positive, cancer chemo, splenectomy, organ transplant, chronic steroids)    Additional Information   Negative: Shock suspected (e.g., cold/pale/clammy skin, too weak to stand, low BP, rapid pulse)   Negative: Difficult to awaken or acting confused (e.g., disoriented, slurred speech)   Negative: Sounds like a life-threatening emergency to the triager   Negative: Dark (cola or tea-colored) or red-colored urine   Negative: [1] Drinking very little AND [2] dehydration suspected (e.g., no urine > 12 hours, very dry mouth, very lightheaded)   Negative: Patient sounds very sick or weak to the triager   Negative: [1] SEVERE pain (e.g., excruciating, unable to do any normal activities) AND [2] not improved 2 hours after pain medicine   Negative: [1] SEVERE pain AND [2] taking a statin medicine (a lipid or cholesterol lowering drug)   Negative: Fever > 104 F (40 C)   Negative: [1] Fever > 101 F (38.3 C) AND [2] age > 60 years   Negative: [1] Fever > 100.0 F (37.8 C) AND [2] bedridden (e.g., CVA, chronic illness, recovering from surgery)   Negative: [1] Fever > 100.0 F (37.8 C) AND [2] indwelling urinary catheter (e.g., [place], [place])   Negative: [1] Fever > 100.0 F (37.8 C) AND [2] diabetes mellitus or weak immune system (e.g., HIV positive, cancer chemo, splenectomy, organ transplant,  chronic steroids)   Negative: Fever present > 3 days (72 hours)   Negative: [1] Muscle aches are unexplained AND [2] occur within 1 month of a tick bite    Protocols used: Muscle Aches and Body Pain-A-AH

## 2023-06-08 NOTE — TELEPHONE ENCOUNTER
"----- Message from Yulissa Santacruz sent at 6/8/2023 10:41 AM CDT -----  Regarding: Pt advice  Contact: Pt    Pt requesting call back in regards to a common cold. Pt would like to know if it is okay to take Theraflu.   Please call and adv @       Confirmed contact below:   Contact Name: Hui Martinez  Phone Number: [phone]               Additional Notes:  "Thank you for all that you do for our patients"                                           "
English

## 2023-06-09 NOTE — TELEPHONE ENCOUNTER
Spoke to pts wife via phone. Instructed to go to ED for SOB/Fever. Pt reports feeling much better and no SOB or fever.

## 2023-06-11 DIAGNOSIS — C83.38 DIFFUSE LARGE B-CELL LYMPHOMA OF LYMPH NODES OF MULTIPLE REGIONS: ICD-10-CM

## 2023-06-11 RX ORDER — ALLOPURINOL 100 MG/1
TABLET ORAL
Qty: 90 TABLET | Refills: 0 | Status: SHIPPED | OUTPATIENT
Start: 2023-06-11 | End: 2023-09-05

## 2023-06-12 RX ORDER — ALLOPURINOL 100 MG/1
300 TABLET ORAL DAILY
Qty: 90 TABLET | Refills: 0 | Status: SHIPPED | OUTPATIENT
Start: 2023-06-12 | End: 2023-07-10

## 2023-06-22 ENCOUNTER — OFFICE VISIT (OUTPATIENT)
Dept: HEMATOLOGY/ONCOLOGY | Facility: CLINIC | Age: 68
End: 2023-06-22
Payer: MEDICARE

## 2023-06-22 ENCOUNTER — INFUSION (OUTPATIENT)
Dept: INFUSION THERAPY | Facility: HOSPITAL | Age: 68
End: 2023-06-22
Payer: MEDICARE

## 2023-06-22 VITALS
HEART RATE: 81 BPM | SYSTOLIC BLOOD PRESSURE: 103 MMHG | OXYGEN SATURATION: 96 % | TEMPERATURE: 98 F | WEIGHT: 120.5 LBS | DIASTOLIC BLOOD PRESSURE: 68 MMHG | BODY MASS INDEX: 19.37 KG/M2 | HEIGHT: 66 IN | RESPIRATION RATE: 18 BRPM

## 2023-06-22 VITALS
HEIGHT: 66 IN | WEIGHT: 120.5 LBS | SYSTOLIC BLOOD PRESSURE: 115 MMHG | OXYGEN SATURATION: 100 % | BODY MASS INDEX: 19.37 KG/M2 | TEMPERATURE: 98 F | HEART RATE: 69 BPM | RESPIRATION RATE: 18 BRPM | DIASTOLIC BLOOD PRESSURE: 74 MMHG

## 2023-06-22 DIAGNOSIS — T45.1X5A CINV (CHEMOTHERAPY-INDUCED NAUSEA AND VOMITING): ICD-10-CM

## 2023-06-22 DIAGNOSIS — R11.2 CINV (CHEMOTHERAPY-INDUCED NAUSEA AND VOMITING): ICD-10-CM

## 2023-06-22 DIAGNOSIS — E78.5 HYPERLIPIDEMIA, UNSPECIFIED HYPERLIPIDEMIA TYPE: ICD-10-CM

## 2023-06-22 DIAGNOSIS — C83.38 DIFFUSE LARGE B-CELL LYMPHOMA OF LYMPH NODES OF MULTIPLE REGIONS: Primary | ICD-10-CM

## 2023-06-22 DIAGNOSIS — K21.9 GASTROESOPHAGEAL REFLUX DISEASE, UNSPECIFIED WHETHER ESOPHAGITIS PRESENT: ICD-10-CM

## 2023-06-22 DIAGNOSIS — D84.9 IMMUNOCOMPROMISED STATE: ICD-10-CM

## 2023-06-22 DIAGNOSIS — R53.82 CHRONIC FATIGUE: ICD-10-CM

## 2023-06-22 DIAGNOSIS — I10 PRIMARY HYPERTENSION: ICD-10-CM

## 2023-06-22 DIAGNOSIS — E87.1 HYPONATREMIA: ICD-10-CM

## 2023-06-22 PROCEDURE — 4010F ACE/ARB THERAPY RXD/TAKEN: CPT | Mod: HCNC,CPTII,S$GLB, | Performed by: NURSE PRACTITIONER

## 2023-06-22 PROCEDURE — A4216 STERILE WATER/SALINE, 10 ML: HCPCS | Mod: HCNC | Performed by: NURSE PRACTITIONER

## 2023-06-22 PROCEDURE — 3288F FALL RISK ASSESSMENT DOCD: CPT | Mod: HCNC,CPTII,S$GLB, | Performed by: NURSE PRACTITIONER

## 2023-06-22 PROCEDURE — 99999 PR PBB SHADOW E&M-EST. PATIENT-LVL IV: ICD-10-PCS | Mod: PBBFAC,HCNC,, | Performed by: NURSE PRACTITIONER

## 2023-06-22 PROCEDURE — 3288F PR FALLS RISK ASSESSMENT DOCUMENTED: ICD-10-PCS | Mod: HCNC,CPTII,S$GLB, | Performed by: NURSE PRACTITIONER

## 2023-06-22 PROCEDURE — 96375 TX/PRO/DX INJ NEW DRUG ADDON: CPT | Mod: HCNC

## 2023-06-22 PROCEDURE — 1159F PR MEDICATION LIST DOCUMENTED IN MEDICAL RECORD: ICD-10-PCS | Mod: HCNC,CPTII,S$GLB, | Performed by: NURSE PRACTITIONER

## 2023-06-22 PROCEDURE — 1126F PR PAIN SEVERITY QUANTIFIED, NO PAIN PRESENT: ICD-10-PCS | Mod: HCNC,CPTII,S$GLB, | Performed by: NURSE PRACTITIONER

## 2023-06-22 PROCEDURE — 99999 PR PBB SHADOW E&M-EST. PATIENT-LVL IV: CPT | Mod: PBBFAC,HCNC,, | Performed by: NURSE PRACTITIONER

## 2023-06-22 PROCEDURE — 1101F PT FALLS ASSESS-DOCD LE1/YR: CPT | Mod: HCNC,CPTII,S$GLB, | Performed by: NURSE PRACTITIONER

## 2023-06-22 PROCEDURE — 1159F MED LIST DOCD IN RCRD: CPT | Mod: HCNC,CPTII,S$GLB, | Performed by: NURSE PRACTITIONER

## 2023-06-22 PROCEDURE — 25000003 PHARM REV CODE 250: Mod: HCNC | Performed by: NURSE PRACTITIONER

## 2023-06-22 PROCEDURE — 4010F PR ACE/ARB THEARPY RXD/TAKEN: ICD-10-PCS | Mod: HCNC,CPTII,S$GLB, | Performed by: NURSE PRACTITIONER

## 2023-06-22 PROCEDURE — 1126F AMNT PAIN NOTED NONE PRSNT: CPT | Mod: HCNC,CPTII,S$GLB, | Performed by: NURSE PRACTITIONER

## 2023-06-22 PROCEDURE — 1101F PR PT FALLS ASSESS DOC 0-1 FALLS W/OUT INJ PAST YR: ICD-10-PCS | Mod: HCNC,CPTII,S$GLB, | Performed by: NURSE PRACTITIONER

## 2023-06-22 PROCEDURE — 96415 CHEMO IV INFUSION ADDL HR: CPT | Mod: HCNC

## 2023-06-22 PROCEDURE — 3078F PR MOST RECENT DIASTOLIC BLOOD PRESSURE < 80 MM HG: ICD-10-PCS | Mod: HCNC,CPTII,S$GLB, | Performed by: NURSE PRACTITIONER

## 2023-06-22 PROCEDURE — 1160F RVW MEDS BY RX/DR IN RCRD: CPT | Mod: HCNC,CPTII,S$GLB, | Performed by: NURSE PRACTITIONER

## 2023-06-22 PROCEDURE — 3008F BODY MASS INDEX DOCD: CPT | Mod: HCNC,CPTII,S$GLB, | Performed by: NURSE PRACTITIONER

## 2023-06-22 PROCEDURE — 63600175 PHARM REV CODE 636 W HCPCS: Mod: JZ,TB,HCNC | Performed by: NURSE PRACTITIONER

## 2023-06-22 PROCEDURE — 3074F SYST BP LT 130 MM HG: CPT | Mod: HCNC,CPTII,S$GLB, | Performed by: NURSE PRACTITIONER

## 2023-06-22 PROCEDURE — 3074F PR MOST RECENT SYSTOLIC BLOOD PRESSURE < 130 MM HG: ICD-10-PCS | Mod: HCNC,CPTII,S$GLB, | Performed by: NURSE PRACTITIONER

## 2023-06-22 PROCEDURE — 99214 OFFICE O/P EST MOD 30 MIN: CPT | Mod: HCNC,S$GLB,, | Performed by: NURSE PRACTITIONER

## 2023-06-22 PROCEDURE — 1160F PR REVIEW ALL MEDS BY PRESCRIBER/CLIN PHARMACIST DOCUMENTED: ICD-10-PCS | Mod: HCNC,CPTII,S$GLB, | Performed by: NURSE PRACTITIONER

## 2023-06-22 PROCEDURE — 3008F PR BODY MASS INDEX (BMI) DOCUMENTED: ICD-10-PCS | Mod: HCNC,CPTII,S$GLB, | Performed by: NURSE PRACTITIONER

## 2023-06-22 PROCEDURE — 99214 PR OFFICE/OUTPT VISIT, EST, LEVL IV, 30-39 MIN: ICD-10-PCS | Mod: HCNC,S$GLB,, | Performed by: NURSE PRACTITIONER

## 2023-06-22 PROCEDURE — 96413 CHEMO IV INFUSION 1 HR: CPT | Mod: HCNC

## 2023-06-22 PROCEDURE — 3078F DIAST BP <80 MM HG: CPT | Mod: HCNC,CPTII,S$GLB, | Performed by: NURSE PRACTITIONER

## 2023-06-22 PROCEDURE — 96417 CHEMO IV INFUS EACH ADDL SEQ: CPT | Mod: HCNC

## 2023-06-22 PROCEDURE — 96367 TX/PROPH/DG ADDL SEQ IV INF: CPT | Mod: HCNC

## 2023-06-22 PROCEDURE — 96411 CHEMO IV PUSH ADDL DRUG: CPT | Mod: HCNC

## 2023-06-22 RX ORDER — DOXORUBICIN HYDROCHLORIDE 2 MG/ML
50 INJECTION, SOLUTION INTRAVENOUS
Status: CANCELLED | OUTPATIENT
Start: 2023-06-22

## 2023-06-22 RX ORDER — HEPARIN 100 UNIT/ML
500 SYRINGE INTRAVENOUS
Status: CANCELLED | OUTPATIENT
Start: 2023-06-22

## 2023-06-22 RX ORDER — DOXORUBICIN HYDROCHLORIDE 2 MG/ML
50 INJECTION, SOLUTION INTRAVENOUS
Status: COMPLETED | OUTPATIENT
Start: 2023-06-22 | End: 2023-06-22

## 2023-06-22 RX ORDER — HEPARIN 100 UNIT/ML
500 SYRINGE INTRAVENOUS
Status: DISCONTINUED | OUTPATIENT
Start: 2023-06-22 | End: 2023-06-22 | Stop reason: HOSPADM

## 2023-06-22 RX ORDER — MEPERIDINE HYDROCHLORIDE 50 MG/ML
25 INJECTION INTRAMUSCULAR; INTRAVENOUS; SUBCUTANEOUS
Status: CANCELLED | OUTPATIENT
Start: 2023-06-22 | End: 2023-06-23

## 2023-06-22 RX ORDER — ACETAMINOPHEN 325 MG/1
650 TABLET ORAL
Status: COMPLETED | OUTPATIENT
Start: 2023-06-22 | End: 2023-06-22

## 2023-06-22 RX ORDER — FAMOTIDINE 10 MG/ML
20 INJECTION INTRAVENOUS
Status: COMPLETED | OUTPATIENT
Start: 2023-06-22 | End: 2023-06-22

## 2023-06-22 RX ORDER — SODIUM CHLORIDE 0.9 % (FLUSH) 0.9 %
10 SYRINGE (ML) INJECTION
Status: CANCELLED | OUTPATIENT
Start: 2023-06-22

## 2023-06-22 RX ORDER — SODIUM CHLORIDE 0.9 % (FLUSH) 0.9 %
10 SYRINGE (ML) INJECTION
Status: DISCONTINUED | OUTPATIENT
Start: 2023-06-22 | End: 2023-06-22 | Stop reason: HOSPADM

## 2023-06-22 RX ORDER — MEPERIDINE HYDROCHLORIDE 50 MG/ML
25 INJECTION INTRAMUSCULAR; INTRAVENOUS; SUBCUTANEOUS
Status: DISCONTINUED | OUTPATIENT
Start: 2023-06-22 | End: 2023-06-22 | Stop reason: HOSPADM

## 2023-06-22 RX ORDER — FAMOTIDINE 10 MG/ML
20 INJECTION INTRAVENOUS
Status: CANCELLED | OUTPATIENT
Start: 2023-06-22

## 2023-06-22 RX ORDER — ACETAMINOPHEN 325 MG/1
650 TABLET ORAL
Status: CANCELLED | OUTPATIENT
Start: 2023-06-22

## 2023-06-22 RX ADMIN — HEPARIN 500 UNITS: 100 SYRINGE at 04:06

## 2023-06-22 RX ADMIN — ACETAMINOPHEN 650 MG: 325 TABLET ORAL at 09:06

## 2023-06-22 RX ADMIN — DOXORUBICIN HYDROCHLORIDE 80 MG: 2 INJECTION, SOLUTION INTRAVENOUS at 03:06

## 2023-06-22 RX ADMIN — DIPHENHYDRAMINE HYDROCHLORIDE 50 MG: 50 INJECTION, SOLUTION INTRAMUSCULAR; INTRAVENOUS at 09:06

## 2023-06-22 RX ADMIN — SODIUM CHLORIDE 593 MG: 0.9 INJECTION, SOLUTION INTRAVENOUS at 11:06

## 2023-06-22 RX ADMIN — CYCLOPHOSPHAMIDE 1180 MG: 200 INJECTION, SOLUTION INTRAVENOUS at 03:06

## 2023-06-22 RX ADMIN — Medication 10 ML: at 04:06

## 2023-06-22 RX ADMIN — DEXAMETHASONE SODIUM PHOSPHATE 0.25 MG: 4 INJECTION, SOLUTION INTRA-ARTICULAR; INTRALESIONAL; INTRAMUSCULAR; INTRAVENOUS; SOFT TISSUE at 02:06

## 2023-06-22 RX ADMIN — SODIUM CHLORIDE: 9 INJECTION, SOLUTION INTRAVENOUS at 08:06

## 2023-06-22 RX ADMIN — VINCRISTINE SULFATE 2 MG: 1 INJECTION, SOLUTION INTRAVENOUS at 03:06

## 2023-06-22 RX ADMIN — FAMOTIDINE 20 MG: 10 INJECTION INTRAVENOUS at 10:06

## 2023-06-22 NOTE — PLAN OF CARE
1626  Patient completed [facility] D1C5, tolerated well.  Port deaccessed without issue, flushed, blood return noted, heparin locked.  RTC 6/23/23 for Udenyca injection.  Patient ambulated off floor accompanied by wife.

## 2023-06-22 NOTE — PLAN OF CARE
0900  Patient seated in chair, VSS, assessment done.  Port accessed without issue, flushed, blood return noted, started NS @ 25 cc/hr KVO while waiting for Ruxience, Vincristine, Doxorubicin, and  Cytoxan from pharmacy.  [facility] for safety

## 2023-06-22 NOTE — PROGRESS NOTES
CC: DLBCL, hematology follow up    HPI: , 68, M, is here for hematology follow up. He has diffuse large B cell lymphoma. He has GERD, HLD, HTn. He presented to outside hospital in 2023 with epigastric pain.  He was found to have an esophageal mass and was transferred to [facility] [place] [facility] for further care. He underwent endoscopy with biopsy of GI mass . He remained stable on liquid diet.     He has lost ~30 lbs in the past 6 months. Denies fevers, night sweats, upper/lower GI bleeding.   Interval History: He is here for follow up prior to cycle 5. He feels better. Previously reported abdominal pain resolved. No GI issues now.  Weight remains stable. Mid treatment PET CT with positive response to treatment. Denies fever, chills, black stools, hematochezia, heron.     Review of patient's allergies indicates:   Allergen Reactions    Amoxicillin Hives and Rash       Current Outpatient Medications   Medication Sig    acyclovir (ZOVIRAX) 400 MG tablet Take 1 tablet (400 mg total) by mouth 2 (two) times daily.    allopurinoL (ZYLOPRIM) 100 MG tablet Take 3 tablets by mouth once daily    allopurinoL (ZYLOPRIM) 100 MG tablet Take 3 tablets (300 mg total) by mouth once daily.    azelastine (ASTELIN) 137 mcg (0.1 %) nasal spray 1 spray (137 mcg total) by Nasal route 2 (two) times daily.    HYDROcodone-acetaminophen (LORTAB ELIXIR)  mg/15 mL Soln Take 15 mLs by mouth every 4 (four) hours as needed (pain).    KENALOG 40 mg/mL injection     LIDOcaine-prilocaine (EMLA) cream Apply topically as needed.    lisinopriL (PRINIVIL,ZESTRIL) 40 MG tablet Take 1 tablet (40 mg total) by mouth once daily.    magnesium hydroxide 400 mg/5 ml (MILK OF MAGNESIA) 400 mg/5 mL Susp Take 30 mLs (2,400 mg total) by mouth daily as needed.    magnesium oxide (MAG-OX) 400 mg (241.3 mg magnesium) tablet Take 1 tablet (400 mg total) by mouth once daily.    morphine 10 mg/5 mL solution SMARTSI.5 Milliliter(s) By Mouth Every 6 Hours PRN     omeprazole (PRILOSEC) 40 MG capsule Take 1 capsule (40 mg total) by mouth 2 (two) times daily before meals.    ondansetron (ZOFRAN) 8 MG tablet Take 1 tablet (8 mg total) by mouth every 8 (eight) hours as needed for Nausea.    potassium chloride SA (K-DUR,KLOR-CON) 20 MEQ tablet Take 1 tablet (20 mEq total) by mouth 3 (three) times daily.    predniSONE (DELTASONE) 50 MG Tab Take 2 tablets (100 mg total) by mouth As instructed. Take 100 mg daily in the morning for 5 days once every 21 days    predniSONE (DELTASONE) 50 MG Tab Take 2 tablets (100 mg total) by mouth once daily. Take on days 2-5 of your chemotherapy cycles.    promethazine (PHENERGAN) 12.5 MG Tab Take 2 tablets (25 mg total) by mouth every 6 (six) hours as needed (Severe Nausea).    sildenafiL (VIAGRA) 100 MG tablet Take 1 tablet (100 mg total) by mouth as needed for Erectile Dysfunction.    simethicone (MYLICON) 80 MG chewable tablet Chew and swallow 1 tablet (80 mg total) by mouth 2 hours after meals and at bedtime.     No current facility-administered medications for this visit.          Review of Systems   Constitutional:  Positive for malaise/fatigue (improving) and weight loss (remains stable now). Negative for chills, diaphoresis and fever.   HENT:  Negative for congestion, ear pain, hearing loss, nosebleeds, sinus pain and tinnitus.    Eyes:  Negative for blurred vision, double vision, photophobia, pain and discharge.   Respiratory:  Negative for cough, hemoptysis and sputum production.    Cardiovascular:  Negative for palpitations, orthopnea, claudication and leg swelling.   Gastrointestinal:  Positive for abdominal pain (resolved), constipation (resolved), heartburn (improved) and nausea (occasionally). Negative for blood in stool, diarrhea, melena and vomiting.   Genitourinary:  Negative for dysuria, frequency, hematuria and urgency.   Musculoskeletal:  Negative for back pain, myalgias and neck pain.   Neurological:  Negative for tingling,  tremors, focal weakness, weakness and headaches.   Endo/Heme/Allergies:  Negative for environmental allergies.   Psychiatric/Behavioral:  Negative for depression, hallucinations and substance abuse. The patient is not nervous/anxious and does not have insomnia.           Vitals:    06/22/23 0738   BP: 103/68   Pulse: 81   Resp: 18   Temp: 97.7 °F (36.5 °C)       PS: ECOG 2    Physical Exam  Constitutional:       Appearance: Normal appearance.   HENT:      Head: Normocephalic and atraumatic.      Mouth/Throat:      Pharynx: No posterior oropharyngeal erythema.   Eyes:      General: No scleral icterus.  Cardiovascular:      Rate and Rhythm: Normal rate and regular rhythm.      Heart sounds: No murmur heard.  Pulmonary:      Effort: Pulmonary effort is normal. No respiratory distress.      Breath sounds: Normal breath sounds. No rhonchi.   Abdominal:      General: There is no distension.      Palpations: There is no mass.      Tenderness: There is no abdominal tenderness.   Musculoskeletal:         General: No swelling.   Lymphadenopathy:      Cervical: No cervical adenopathy.   Skin:     Coloration: Skin is not jaundiced.   Neurological:      General: No focal deficit present.      Mental Status: He is alert and oriented to person, place, and time.      Cranial Nerves: No cranial nerve deficit.        2/18/23 CT abdomen pelvis with contrast    COMPARISON:  None     FINDINGS:  Abdomen:     - Lung bases: There is focal emphysematous change in the medial left lung base.  Lung bases contain mild areas of peripheral chronic atelectasis.  A focus of suspected atelectasis simulates a nodular opacity at the right dome of the diaphragm.     There is distal esophageal distension with fluid secondary to and infiltrative type mass involving the visualized cardiac portion of the stomach extending to the GE junction concerning for malignancy.  There is a mantle of lobular adenopathy surrounding the proximal abdominal aorta  appearing to involve the retrocrural lymph nodes and adjacent periaortic and pericaval lymph nodes to the level of the left renal vein with the renal a vein somewhat displaced.  No visible renal vein invasion/thrombosis.  Smaller shotty type lymph nodes are seen caudal to the renal veins in the retroperitoneum.     - Liver: The liver appears homogeneous and not significantly enlarged.     - Gallbladder: There is no CT evidence of cholecystitis or cholelithiasis.     - Bile Ducts: No evidence of intra or extra hepatic biliary ductal dilation.     - Spleen: Negative.     - Kidneys: The kidneys enhance symmetrically and homogeneously.     - Adrenals: Unremarkable.     - Pancreas: Pancreas is displaced anteriorly by the retroperitoneal adenopathy with an ill-defined mass around 3 cm in diameter in the body appearing contiguous with the pancreatic parenchyma and similar in density to the retroperitoneal adenopathy consistent with involvement/invasion.  Additionally there is no fat plane margin between the posterior pancreatic body and the lobular retroperitoneal mantle of adenopathy.  Heterogeneous enhancement of the body of the pancreas in this region and poor visualization of the splenic artery and vein within the body suggests vessel encasement/involvement.  Partial thrombosis of portions of these vessels is suspected.     -Vascular: No abdominal aortic aneurysm or significant atherosclerosis.     - Abdominal wall:  There is a small right inguinal hernia.     Small bowel and colon: There is no small bowel distension.  There is no mesenteric convincing adenopathy.  Shotty lymph nodes are noted.  No extraluminal free air or free fluid is seen in the abdomen or pelvis.  Appendix is not isolated as a separate structure however there is no evidence of appendicitis.  Few scattered diverticuli are seen distally in the colon.  No diverticulitis.     Pelvis:     There is no pelvic mass, adenopathy, or free fluid.  Bladder is  unremarkable noting mild thickening of the mucosa can be explained by under distension.  The prostate is borderline mildly prominent.     Bones:  No acute osseous abnormality and no suspicious lytic or blastic lesion.     Impression:     Bulky infiltrative type mass compatible with malignancy involving the gastric cardiac portion extending to the GE junction with esophageal high-grade obstruction .  Further evaluation can be made with CT chest.     Bulky adenopathy in the retroperitoneum in the periaortic and pericaval region to the level of the left renal vein also appearing to involve the dorsal pancreatic body and also appearing to involve and obstruct the splenic artery and vein as described.     Incidental additional nonacute findings are discussed in the body of the report.       2/20/23 upper GI EUS     --One extrinsic moderate stenosis was found at the gastroesophageal junction. The stenosis was traversed.      --  A large, fungating and infiltrative mass with oozing bleeding was found in the cardia, in the gastric fundus and in the gastric body.        --Biopsies were taken with a cold forceps for histology.        --The examined duodenum was normal.          ENDOSONOGRAPHIC FINDING:          -- The esophagus and adjacent structures were visualized endosonographically.      --  A hypoechoic mass was identified endosonographically in the cardia of the stomach and in the fundus of the stomach (seen from the GE  junction, as EUS scope could not traverse the area of extrinsic        stenosis). The mass measured 52 mm by 62 mm in maximal cross-sectional diameter.     Impression:            - Extrinsic narrowing of the esophagus.                          - Gastric tumor in the cardia, in the gastric fundus and in the gastric body. Biopsied.                          - Normal examined duodenum.         1. GASTRIC MASS BIOPSY:            -  Diffuse large B-cell lymphoma, non-germinal center subtype          -  High  proliferation index (99% by Ki-67 immunostain)          -  Negative for CD30 co-expression by immunostain          -  TREY JANA positive for EBV-encoded small mRNAs in  few scattered   bystander lymphocytes          -   PENDING  (send-out): MYC tech only immunostain and Double/Triple Hit Lymphoma FISH panel with results to be reported in a separate supplemental and final diagnosis may be further classified             at that time   COMMENT: No flow cytometric analysis was performed on this specimen    Low and high power examination reveal minute irregular fragments of gastric tissue with an atypical diffuse submucosal infiltrate with focal crush artifacts and composed predominantly of large lymphoma cells with occasional   prominent central nucleoli and focal areas with increased scattered mature eosinophils and rarer scattered neutrophils, histiocytes, small mature lymphocytes, and plasma cells.   AE1/AE3, Synaptophysin, and Chromogranin immunostains were performed with adequate controls on block 1A by the referring surgical pathologist who   initially reviewed this biopsy and are negative for carcinoma or infiltrating neuroendocrine cells.   Immunohistochemical study with stains for CD3, CD5, BCL-2, CD20, CD23, Cyclin D1, CD10, BCL-6, MUM-1, CD30, and Ki-67 and EBV-encoded small mRNAs in-situ hybridization (TREY JANA) were performed on block 1A with appropriate controls   for increased sensitivity and cellular localization within the cells of interest.  Unstained slides of block 1A were sent out to [facility]   Laboratories for MYC tech only immunostain with results to be interpreted at [facility] and Double/Triple Hit Lymphoma FISH panel.   The large lymphoma cells are positive for CD20 and MUM-1 (nuclear) and negative for AE1/AE3, Synaptophysin, Chromogranin, CD3, CD5, CD23, Cyclin D1   (nuclear), CD10 (dim <30%), BCL-6 (strong nuclear <30%), and CD30. Ki-67 (nuclear) highlights a high  proliferation index (99%). TREY JANA stains few scattered small and medium-sized bystander lymphocytes.   AE1/A3 stains mucosal and glandular epithelial cells. Synaptophysin and/or Chromogranin stain a rare subset of normal neuroendocrine cells associated   with the glandular epithelial cells.   CD3, CD5, and BCL-2 co-stain numerous scattered reactive T-cells within the focal non-involved areas of the biopsy. CD5 also appears to stain glandular   epithelial cells and a rare subset of the mucosal epithelial cells. Cyclin D1 (nuclear) stains epithelial and endothelial cells. CD10 stains stromal cells and few scattered neutrophils. Rare scattered medium-sized CD30+ cells (favor   reactive immunoblasts) are identified      3/8/23 hemoglobin electrophoresis: No electrophoretic evidence of abnormal hemoglobin or beta thalassemia. See comment    3/23/23 PET CT      COMPARISON:  CT abdomen pelvis 02/18/2023     FINDINGS:  Quality of the study: Adequate.     Reference values:     Mediastinal blood pool maximum SUV: 1.7     Normal liver background maximum SUV: 1.4     There is lymphadenopathy above below the diaphragm.     In the head and neck, there are no hypermetabolic lesions worrisome for malignancy. There are no hypermetabolic mucosal lesions, and there are no pathologically enlarged or hypermetabolic lymph nodes.     In the chest, there is mediastinal lymphadenopathy e.g. subcarinal node measuring 1.0 cm with SUV max 28 (image 70) and left upper paratracheal node measuring 0.6 cm with SUV max 12 (image 41).     Small left pleural effusion.  No concerning pulmonary nodules or masses.     In the abdomen and pelvis, there is marked diffuse stomach wall thickening with intense uptake, SUV max 42.  Large wanda conglomerate in the mid abdomen with SUV max 36 (axial fused image 125), difficult to delineate from surrounding bowel and vessels CT.  Additional scattered hypermetabolic lymphadenopathy involving retrocrural nodes,  mesenteric nodes, and omental nodules.     There is physiologic tracer distribution within the abdominal organs and excretion into the genitourinary system including probable pooling of excreted tracer within the penile urethra.     The spleen has normal uptake and is nonenlarged at 5 cm in craniocaudal dimension.     In the bones, there are no hypermetabolic lesions worrisome for malignancy.     In the extremities, there are no hypermetabolic lesions worrisome for malignancy.     Incidental CT findings: Right chest port with catheter tip in the right atrium.  Multi-vessel coronary calcifications.     Impression:     1.  In this patient with lymphoma, there is hypermetabolic lymphadenopathy above and below the diaphragm, well as an infiltrative stomach mass consistent with extranodal disease.  Significant lymphadenopathy below the diaphragm is difficult to delineate without intravenous contrast, and bulky disease is not excluded.       3/28/23 ECHO    Normal central venous pressure (3 mmHg).  The estimated PA systolic pressure is 40 mmHg.  The left ventricle is normal in size with normal systolic function.  The estimated ejection fraction is 60%.  Grade I left ventricular diastolic dysfunction.  Normal right ventricular size with normal right ventricular systolic function.  Mild to moderate tricuspid regurgitation.  Small pericardial effusion.      3/31/23 CSF cytology: Negative for malignant cells  Lab Results   Component Value Date    WBC 4.42 06/22/2023    HGB 8.9 (L) 06/22/2023    HCT 28.1 (L) 06/22/2023    MCV 83 06/22/2023     06/22/2023       CMP  Sodium   Date Value Ref Range Status   06/22/2023 135 (L) 136 - 145 mmol/L Final     Potassium   Date Value Ref Range Status   06/22/2023 3.7 3.5 - 5.1 mmol/L Final     Chloride   Date Value Ref Range Status   06/22/2023 103 95 - 110 mmol/L Final     CO2   Date Value Ref Range Status   06/22/2023 25 23 - 29 mmol/L Final     Glucose   Date Value Ref Range  Status   06/22/2023 61 (L) 70 - 110 mg/dL Final     BUN   Date Value Ref Range Status   06/22/2023 11 8 - 23 mg/dL Final     Creatinine   Date Value Ref Range Status   06/22/2023 0.8 0.5 - 1.4 mg/dL Final     Calcium   Date Value Ref Range Status   06/22/2023 9.2 8.7 - 10.5 mg/dL Final     Total Protein   Date Value Ref Range Status   06/22/2023 6.3 6.0 - 8.4 g/dL Final     Albumin   Date Value Ref Range Status   06/22/2023 3.5 3.5 - 5.2 g/dL Final     Total Bilirubin   Date Value Ref Range Status   06/22/2023 0.3 0.1 - 1.0 mg/dL Final     Comment:     For infants and newborns, interpretation of results should be based  on gestational age, weight and in agreement with clinical  observations.    Premature Infant recommended reference ranges:  Up to 24 hours.............<8.0 mg/dL  Up to 48 hours............<12.0 mg/dL  3-5 days..................<15.0 mg/dL  6-29 days.................<15.0 mg/dL       Alkaline Phosphatase   Date Value Ref Range Status   06/22/2023 91 55 - 135 U/L Final     AST   Date Value Ref Range Status   06/22/2023 31 10 - 40 U/L Final     ALT   Date Value Ref Range Status   06/22/2023 15 10 - 44 U/L Final     Anion Gap   Date Value Ref Range Status   06/22/2023 7 (L) 8 - 16 mmol/L Final     eGFR   Date Value Ref Range Status   06/22/2023 >60.0 >60 mL/min/1.73 m^2 Final     1. Diffuse large B-cell lymphoma of lymph nodes of multiple regions  Rapid BMT CBC with Diff    Comprehensive Metabolic Panel    Lactate Dehydrogenase      2. Chronic fatigue        3. Hyperlipidemia, unspecified hyperlipidemia type        4. Gastroesophageal reflux disease, unspecified whether esophagitis present        5. Immunocompromised state        6. Primary hypertension        7. CINV (chemotherapy-induced nausea and vomiting)        8. Hyponatremia                    Assessment:    1. DLBCL, non-GC type of multiple sites  2. DLBCL of stomach  3. Fatigue  4. Microcytic anemia  5. Htn, primary  6. HLD  7.GERD  8. Cancer  associated pain  9. Constipation  10. Weight loss  11. Malnutrition  12. Hypokalemia  13. hyponatremia      Plan:    1,2: I discussed the diagnosis, prognosis of diffuse large B cell lymphoma. I explained that the treatment is with multi-agent chemotherapy. Staging with PETCT, BM biopsy.  FISH pending. Ki 67 high. He will have CBC, CMP, LDH, uric acid, G6PD, HBV, HIV serology checked. He will start allopurinol 300mg daily to prevent tumor lysis syndrome.  PET CT  on 3/23/23 demonstrated hypermetabolic lymphadenopathy above and below the diaphragm, well as an infiltrative stomach mass consistent with extranodal disease. ECHO showed normal LVEF on 3/28/23.   NCCN IPI score 6, suggesting high risk disease, with estimated 5 year PFS of 30% and estimated 5 year OS of 33%.   Cycle 1 chemotherapy was in-patient, on 3/29/23. Rituximab was omitted from cycle 1 due to risk of GI perforation. IT MTX administered with cycle 1 CHOP as CNS IPI was intermediate/ high. No malignant cells identified in CSF on 3/31/23.   He will receive cycle 5 today. Mid treatment PET CT with positive response to treatment(05/30/23). Patient to get 6 cycles of treatment, previous insurance issues with 6th cycle resolved now. Plan for EOT PET, upper endoscopy following 6th cycle.         4. TIBC low, ferritin normal,. Saturated iron 12% on 2/19/23.  hemoglobin electrophoresis showed normal pattern on 3/8/23.     5,6: He follows with     7. He is on omeprazole BID    8. He is on morphine 15mg liquid by mouth as needed, q 6hour. He is aware of the risks associated with morphine, including drowsiness, risk of falls, and constipation. He is aware he cannot drive if he is using morphine.  Pain resolved now, not taking pain meds at this time    9. He has poor oral intake. He has bowel sounds on examination. He will try miralax. Constipation resolved. Appetite improved now    10, 11. Secondary to poor oral intake. He is on liquid diet. Encouraged  use of ENSURE three times daily.  - Improved food intake     12. He will take oral potassium. Taking potassium 20 meq TID. Reported stomach upset , he will reduce to 1 tab a day and dietary adjustments.  Today's K+ wnl      13. Mild, encouraged increased hydration. Improved from previous labs         BMT Chart Routing      Follow up with physician    Follow up with BRIAN . Add visit on 07/13 prior chemo   Provider visit type    Infusion scheduling note    Injection scheduling note Keep schedule chemo appointment on on 07/13&07/14   Labs   Scheduling:  Preferred lab:  Lab interval:  Cbc, cmp, LDH prior chemo on 07/13   Imaging    Pharmacy appointment    Other referrals             Advance Care Planning     Date: 06/22/2023   We previously had discussions regarding his underlying diagnosis, natural history, prognosis, and treatment options.  He was interested in pursuing any and all treatment options available to him, including current chemo therapy.   He remains interested in pursuing all treatment needed.  Will continue chemo therapy monitoring.  Total time of this visit was 30 minutes, including time spent face to face with patient and also in preparing for today's visit for MDM and documentation. (Medical Decision Making, including consideration of possible diagnoses, management options, complex medical record review, review of diagnostic tests and information, consideration and discussion of significant complications based on comorbidities, and discussion with providers involved with the care of the patient). Greater than 50% was spent face to face with the patient counseling and coordinating care.    Nicole Dia NP  Hematology/Oncology/BMT

## 2023-06-23 ENCOUNTER — INFUSION (OUTPATIENT)
Dept: INFUSION THERAPY | Facility: HOSPITAL | Age: 68
End: 2023-06-23
Payer: MEDICARE

## 2023-06-23 DIAGNOSIS — C83.38 DIFFUSE LARGE B-CELL LYMPHOMA OF LYMPH NODES OF MULTIPLE REGIONS: Primary | ICD-10-CM

## 2023-06-23 PROCEDURE — 63600175 PHARM REV CODE 636 W HCPCS: Mod: JZ,JG,HCNC | Performed by: NURSE PRACTITIONER

## 2023-06-23 PROCEDURE — 96372 THER/PROPH/DIAG INJ SC/IM: CPT | Mod: HCNC

## 2023-06-23 RX ADMIN — PEGFILGRASTIM-CBQV 6 MG: 6 INJECTION, SOLUTION SUBCUTANEOUS at 10:06

## 2023-06-29 ENCOUNTER — PATIENT MESSAGE (OUTPATIENT)
Dept: HEMATOLOGY/ONCOLOGY | Facility: CLINIC | Age: 68
End: 2023-06-29
Payer: MEDICARE

## 2023-07-04 ENCOUNTER — PATIENT MESSAGE (OUTPATIENT)
Dept: HEMATOLOGY/ONCOLOGY | Facility: CLINIC | Age: 68
End: 2023-07-04
Payer: MEDICARE

## 2023-07-09 DIAGNOSIS — C83.38 DIFFUSE LARGE B-CELL LYMPHOMA OF LYMPH NODES OF MULTIPLE REGIONS: ICD-10-CM

## 2023-07-10 RX ORDER — ALLOPURINOL 100 MG/1
TABLET ORAL
Qty: 90 TABLET | Refills: 3 | Status: ON HOLD | OUTPATIENT
Start: 2023-07-10 | End: 2024-01-04

## 2023-07-11 ENCOUNTER — LAB VISIT (OUTPATIENT)
Dept: LAB | Facility: HOSPITAL | Age: 68
End: 2023-07-11
Payer: MEDICARE

## 2023-07-11 DIAGNOSIS — C83.38 DIFFUSE LARGE B-CELL LYMPHOMA OF LYMPH NODES OF MULTIPLE REGIONS: ICD-10-CM

## 2023-07-11 LAB
ALBUMIN SERPL BCP-MCNC: 3.3 G/DL (ref 3.5–5.2)
ALP SERPL-CCNC: 118 U/L (ref 55–135)
ALT SERPL W/O P-5'-P-CCNC: 14 U/L (ref 10–44)
ANION GAP SERPL CALC-SCNC: 10 MMOL/L (ref 8–16)
ANISOCYTOSIS BLD QL SMEAR: SLIGHT
AST SERPL-CCNC: 31 U/L (ref 10–40)
BASOPHILS # BLD AUTO: ABNORMAL K/UL (ref 0–0.2)
BASOPHILS NFR BLD: 0 % (ref 0–1.9)
BILIRUB SERPL-MCNC: 0.2 MG/DL (ref 0.1–1)
BUN SERPL-MCNC: 8 MG/DL (ref 8–23)
CALCIUM SERPL-MCNC: 8.9 MG/DL (ref 8.7–10.5)
CHLORIDE SERPL-SCNC: 101 MMOL/L (ref 95–110)
CO2 SERPL-SCNC: 22 MMOL/L (ref 23–29)
CREAT SERPL-MCNC: 0.7 MG/DL (ref 0.5–1.4)
DACRYOCYTES BLD QL SMEAR: ABNORMAL
DIFFERENTIAL METHOD: ABNORMAL
EOSINOPHIL # BLD AUTO: ABNORMAL K/UL (ref 0–0.5)
EOSINOPHIL NFR BLD: 0 % (ref 0–8)
ERYTHROCYTE [DISTWIDTH] IN BLOOD BY AUTOMATED COUNT: 18.3 % (ref 11.5–14.5)
EST. GFR  (NO RACE VARIABLE): >60 ML/MIN/1.73 M^2
GLUCOSE SERPL-MCNC: 55 MG/DL (ref 70–110)
HCT VFR BLD AUTO: 25.8 % (ref 40–54)
HGB BLD-MCNC: 8.5 G/DL (ref 14–18)
HYPOCHROMIA BLD QL SMEAR: ABNORMAL
IMM GRANULOCYTES # BLD AUTO: ABNORMAL K/UL (ref 0–0.04)
IMM GRANULOCYTES NFR BLD AUTO: ABNORMAL % (ref 0–0.5)
LDH SERPL L TO P-CCNC: 469 U/L (ref 110–260)
LYMPHOCYTES # BLD AUTO: ABNORMAL K/UL (ref 1–4.8)
LYMPHOCYTES NFR BLD: 12 % (ref 18–48)
MCH RBC QN AUTO: 27.7 PG (ref 27–31)
MCHC RBC AUTO-ENTMCNC: 32.9 G/DL (ref 32–36)
MCV RBC AUTO: 84 FL (ref 82–98)
MONOCYTES # BLD AUTO: ABNORMAL K/UL (ref 0.3–1)
MONOCYTES NFR BLD: 6 % (ref 4–15)
NEUTROPHILS # BLD AUTO: ABNORMAL K/UL (ref 1.8–7.7)
NEUTROPHILS NFR BLD: 81 % (ref 38–73)
NEUTS BAND NFR BLD MANUAL: 1 %
NRBC BLD-RTO: 0 /100 WBC
OVALOCYTES BLD QL SMEAR: ABNORMAL
PLATELET # BLD AUTO: 220 K/UL (ref 150–450)
PMV BLD AUTO: 9.4 FL (ref 9.2–12.9)
POIKILOCYTOSIS BLD QL SMEAR: SLIGHT
POLYCHROMASIA BLD QL SMEAR: ABNORMAL
POTASSIUM SERPL-SCNC: 4.2 MMOL/L (ref 3.5–5.1)
PROT SERPL-MCNC: 6.3 G/DL (ref 6–8.4)
RBC # BLD AUTO: 3.07 M/UL (ref 4.6–6.2)
SCHISTOCYTES BLD QL SMEAR: ABNORMAL
SODIUM SERPL-SCNC: 133 MMOL/L (ref 136–145)
SPHEROCYTES BLD QL SMEAR: ABNORMAL
WBC # BLD AUTO: 7.24 K/UL (ref 3.9–12.7)

## 2023-07-11 PROCEDURE — 80053 COMPREHEN METABOLIC PANEL: CPT | Mod: HCNC | Performed by: NURSE PRACTITIONER

## 2023-07-11 PROCEDURE — 85007 BL SMEAR W/DIFF WBC COUNT: CPT | Mod: HCNC | Performed by: NURSE PRACTITIONER

## 2023-07-11 PROCEDURE — 36415 COLL VENOUS BLD VENIPUNCTURE: CPT | Mod: HCNC | Performed by: NURSE PRACTITIONER

## 2023-07-11 PROCEDURE — 85027 COMPLETE CBC AUTOMATED: CPT | Mod: HCNC | Performed by: NURSE PRACTITIONER

## 2023-07-11 PROCEDURE — 83615 LACTATE (LD) (LDH) ENZYME: CPT | Mod: HCNC | Performed by: NURSE PRACTITIONER

## 2023-07-12 RX ORDER — MEPERIDINE HYDROCHLORIDE 50 MG/ML
25 INJECTION INTRAMUSCULAR; INTRAVENOUS; SUBCUTANEOUS
Status: CANCELLED | OUTPATIENT
Start: 2023-07-13 | End: 2023-07-14

## 2023-07-12 RX ORDER — SODIUM CHLORIDE 0.9 % (FLUSH) 0.9 %
10 SYRINGE (ML) INJECTION
Status: CANCELLED | OUTPATIENT
Start: 2023-07-13

## 2023-07-12 RX ORDER — FAMOTIDINE 10 MG/ML
20 INJECTION INTRAVENOUS
Status: CANCELLED | OUTPATIENT
Start: 2023-07-13

## 2023-07-12 RX ORDER — HEPARIN 100 UNIT/ML
500 SYRINGE INTRAVENOUS
Status: CANCELLED | OUTPATIENT
Start: 2023-07-13

## 2023-07-12 RX ORDER — ACETAMINOPHEN 325 MG/1
650 TABLET ORAL
Status: CANCELLED | OUTPATIENT
Start: 2023-07-13

## 2023-07-12 RX ORDER — DOXORUBICIN HYDROCHLORIDE 2 MG/ML
50 INJECTION, SOLUTION INTRAVENOUS
Status: CANCELLED | OUTPATIENT
Start: 2023-07-13

## 2023-07-13 ENCOUNTER — INFUSION (OUTPATIENT)
Dept: INFUSION THERAPY | Facility: HOSPITAL | Age: 68
End: 2023-07-13
Payer: MEDICARE

## 2023-07-13 VITALS
DIASTOLIC BLOOD PRESSURE: 66 MMHG | RESPIRATION RATE: 18 BRPM | WEIGHT: 117.38 LBS | HEIGHT: 66 IN | SYSTOLIC BLOOD PRESSURE: 113 MMHG | BODY MASS INDEX: 18.86 KG/M2 | TEMPERATURE: 98 F | OXYGEN SATURATION: 99 % | HEART RATE: 62 BPM

## 2023-07-13 DIAGNOSIS — C83.38 DIFFUSE LARGE B-CELL LYMPHOMA OF LYMPH NODES OF MULTIPLE REGIONS: Primary | ICD-10-CM

## 2023-07-13 PROCEDURE — 25000003 PHARM REV CODE 250: Mod: HCNC | Performed by: INTERNAL MEDICINE

## 2023-07-13 PROCEDURE — 96417 CHEMO IV INFUS EACH ADDL SEQ: CPT | Mod: HCNC

## 2023-07-13 PROCEDURE — 96415 CHEMO IV INFUSION ADDL HR: CPT | Mod: HCNC

## 2023-07-13 PROCEDURE — A4216 STERILE WATER/SALINE, 10 ML: HCPCS | Mod: HCNC | Performed by: INTERNAL MEDICINE

## 2023-07-13 PROCEDURE — 96367 TX/PROPH/DG ADDL SEQ IV INF: CPT | Mod: HCNC

## 2023-07-13 PROCEDURE — 96411 CHEMO IV PUSH ADDL DRUG: CPT | Mod: HCNC

## 2023-07-13 PROCEDURE — 96376 TX/PRO/DX INJ SAME DRUG ADON: CPT | Mod: HCNC

## 2023-07-13 PROCEDURE — 96413 CHEMO IV INFUSION 1 HR: CPT | Mod: HCNC

## 2023-07-13 PROCEDURE — 63600175 PHARM REV CODE 636 W HCPCS: Mod: JG,HCNC | Performed by: INTERNAL MEDICINE

## 2023-07-13 RX ORDER — FAMOTIDINE 10 MG/ML
20 INJECTION INTRAVENOUS
Status: COMPLETED | OUTPATIENT
Start: 2023-07-13 | End: 2023-07-13

## 2023-07-13 RX ORDER — SODIUM CHLORIDE 0.9 % (FLUSH) 0.9 %
10 SYRINGE (ML) INJECTION
Status: DISCONTINUED | OUTPATIENT
Start: 2023-07-13 | End: 2023-07-13 | Stop reason: HOSPADM

## 2023-07-13 RX ORDER — DOXORUBICIN HYDROCHLORIDE 2 MG/ML
50 INJECTION, SOLUTION INTRAVENOUS
Status: COMPLETED | OUTPATIENT
Start: 2023-07-13 | End: 2023-07-13

## 2023-07-13 RX ORDER — HEPARIN 100 UNIT/ML
500 SYRINGE INTRAVENOUS
Status: DISCONTINUED | OUTPATIENT
Start: 2023-07-13 | End: 2023-07-13 | Stop reason: HOSPADM

## 2023-07-13 RX ORDER — MEPERIDINE HYDROCHLORIDE 50 MG/ML
25 INJECTION INTRAMUSCULAR; INTRAVENOUS; SUBCUTANEOUS
Status: DISCONTINUED | OUTPATIENT
Start: 2023-07-13 | End: 2023-07-13 | Stop reason: HOSPADM

## 2023-07-13 RX ORDER — ACETAMINOPHEN 325 MG/1
650 TABLET ORAL
Status: COMPLETED | OUTPATIENT
Start: 2023-07-13 | End: 2023-07-13

## 2023-07-13 RX ADMIN — SODIUM CHLORIDE: 9 INJECTION, SOLUTION INTRAVENOUS at 09:07

## 2023-07-13 RX ADMIN — DIPHENHYDRAMINE HYDROCHLORIDE 50 MG: 50 INJECTION, SOLUTION INTRAMUSCULAR; INTRAVENOUS at 09:07

## 2023-07-13 RX ADMIN — VINCRISTINE SULFATE 2 MG: 1 INJECTION, SOLUTION INTRAVENOUS at 12:07

## 2023-07-13 RX ADMIN — SODIUM CHLORIDE 593 MG: 9 INJECTION, SOLUTION INTRAVENOUS at 10:07

## 2023-07-13 RX ADMIN — HEPARIN 500 UNITS: 100 SYRINGE at 01:07

## 2023-07-13 RX ADMIN — FAMOTIDINE 20 MG: 10 INJECTION INTRAVENOUS at 10:07

## 2023-07-13 RX ADMIN — DOXORUBICIN HYDROCHLORIDE 80 MG: 2 INJECTION, SOLUTION INTRAVENOUS at 12:07

## 2023-07-13 RX ADMIN — Medication 10 ML: at 01:07

## 2023-07-13 RX ADMIN — CYCLOPHOSPHAMIDE 1180 MG: 200 INJECTION, SOLUTION INTRAVENOUS at 12:07

## 2023-07-13 RX ADMIN — ACETAMINOPHEN 650 MG: 325 TABLET ORAL at 09:07

## 2023-07-13 NOTE — PLAN OF CARE
1400  Patient completed  RCHOP D1C6, tolerated well.  Port deaccessed without issue, flushed, blood return noted, heparin locked.  RTC 7/14/23  Patient ambulated off floor accompanied by wife.

## 2023-07-13 NOTE — PLAN OF CARE
0900  Patient seated in chair accompanied by wife.  VSS, assessment done, NS @ 50 cc/hr KVO started while waiting for Ruxience, Vincristine, Doxorubicin, Cytoxan from pharmacy.  WCTM for safety

## 2023-07-14 ENCOUNTER — INFUSION (OUTPATIENT)
Dept: INFUSION THERAPY | Facility: HOSPITAL | Age: 68
End: 2023-07-14
Payer: MEDICARE

## 2023-07-14 DIAGNOSIS — C83.38 DIFFUSE LARGE B-CELL LYMPHOMA OF LYMPH NODES OF MULTIPLE REGIONS: Primary | ICD-10-CM

## 2023-07-14 PROCEDURE — 96372 THER/PROPH/DIAG INJ SC/IM: CPT | Mod: HCNC

## 2023-07-14 PROCEDURE — 63600175 PHARM REV CODE 636 W HCPCS: Mod: JZ,JG,HCNC | Performed by: INTERNAL MEDICINE

## 2023-07-14 RX ADMIN — PEGFILGRASTIM-CBQV 6 MG: 6 INJECTION, SOLUTION SUBCUTANEOUS at 10:07

## 2023-07-14 NOTE — NURSING
1020 pt here for udenyca injection tolerated well to abdomen, pt with no upcoming appts , no distress noted upon d/c to home

## 2023-07-19 ENCOUNTER — TELEPHONE (OUTPATIENT)
Dept: HEMATOLOGY/ONCOLOGY | Facility: CLINIC | Age: 68
End: 2023-07-19
Payer: MEDICARE

## 2023-07-26 ENCOUNTER — TELEPHONE (OUTPATIENT)
Dept: HEMATOLOGY/ONCOLOGY | Facility: CLINIC | Age: 68
End: 2023-07-26
Payer: MEDICARE

## 2023-07-26 DIAGNOSIS — C83.38 DIFFUSE LARGE B-CELL LYMPHOMA OF LYMPH NODES OF MULTIPLE REGIONS: Primary | ICD-10-CM

## 2023-08-03 ENCOUNTER — HOSPITAL ENCOUNTER (OUTPATIENT)
Dept: RADIOLOGY | Facility: HOSPITAL | Age: 68
Discharge: HOME | End: 2023-08-03
Attending: NURSE PRACTITIONER
Payer: MEDICARE

## 2023-08-03 DIAGNOSIS — C83.38 DIFFUSE LARGE B-CELL LYMPHOMA OF LYMPH NODES OF MULTIPLE REGIONS: ICD-10-CM

## 2023-08-03 PROCEDURE — A9552 F18 FDG: HCPCS | Mod: HCNC

## 2023-08-03 PROCEDURE — 78815 NM PET CT ROUTINE: ICD-10-PCS | Mod: 26,PS,HCNC, | Performed by: STUDENT IN AN ORGANIZED HEALTH CARE EDUCATION/TRAINING PROGRAM

## 2023-08-03 PROCEDURE — 78815 PET IMAGE W/CT SKULL-THIGH: CPT | Mod: 26,PS,HCNC, | Performed by: STUDENT IN AN ORGANIZED HEALTH CARE EDUCATION/TRAINING PROGRAM

## 2023-08-04 LAB — POCT GLUCOSE: 94 MG/DL (ref 70–110)

## 2023-08-08 ENCOUNTER — PATIENT MESSAGE (OUTPATIENT)
Dept: HEMATOLOGY/ONCOLOGY | Facility: CLINIC | Age: 68
End: 2023-08-08
Payer: MEDICARE

## 2023-08-09 ENCOUNTER — TELEPHONE (OUTPATIENT)
Dept: HEMATOLOGY/ONCOLOGY | Facility: CLINIC | Age: 68
End: 2023-08-09
Payer: MEDICARE

## 2023-08-14 ENCOUNTER — PES CALL (OUTPATIENT)
Dept: ADMINISTRATIVE | Facility: CLINIC | Age: 68
End: 2023-08-14
Payer: MEDICARE

## 2023-08-21 ENCOUNTER — OFFICE VISIT (OUTPATIENT)
Dept: HEMATOLOGY/ONCOLOGY | Facility: CLINIC | Age: 68
End: 2023-08-21
Payer: MEDICARE

## 2023-08-21 ENCOUNTER — PATIENT MESSAGE (OUTPATIENT)
Dept: HEMATOLOGY/ONCOLOGY | Facility: CLINIC | Age: 68
End: 2023-08-21
Payer: MEDICARE

## 2023-08-21 ENCOUNTER — LAB VISIT (OUTPATIENT)
Dept: LAB | Facility: HOSPITAL | Age: 68
End: 2023-08-21
Attending: INTERNAL MEDICINE
Payer: MEDICARE

## 2023-08-21 ENCOUNTER — TELEPHONE (OUTPATIENT)
Dept: ENDOSCOPY | Facility: HOSPITAL | Age: 68
End: 2023-08-21
Payer: MEDICARE

## 2023-08-21 VITALS
WEIGHT: 123.69 LBS | SYSTOLIC BLOOD PRESSURE: 125 MMHG | BODY MASS INDEX: 19.88 KG/M2 | HEIGHT: 66 IN | HEART RATE: 83 BPM | TEMPERATURE: 98 F | RESPIRATION RATE: 18 BRPM | OXYGEN SATURATION: 99 % | DIASTOLIC BLOOD PRESSURE: 78 MMHG

## 2023-08-21 DIAGNOSIS — T45.1X5A CHEMOTHERAPY-INDUCED NEUTROPENIA: ICD-10-CM

## 2023-08-21 DIAGNOSIS — C83.38 DIFFUSE LARGE B-CELL LYMPHOMA OF LYMPH NODES OF MULTIPLE REGIONS: Primary | ICD-10-CM

## 2023-08-21 DIAGNOSIS — C83.38 DIFFUSE LARGE B-CELL LYMPHOMA OF LYMPH NODES OF MULTIPLE REGIONS: ICD-10-CM

## 2023-08-21 DIAGNOSIS — K21.9 GASTROESOPHAGEAL REFLUX DISEASE, UNSPECIFIED WHETHER ESOPHAGITIS PRESENT: ICD-10-CM

## 2023-08-21 DIAGNOSIS — D84.9 IMMUNOCOMPROMISED STATE: ICD-10-CM

## 2023-08-21 DIAGNOSIS — D70.1 CHEMOTHERAPY-INDUCED NEUTROPENIA: ICD-10-CM

## 2023-08-21 DIAGNOSIS — T45.1X5A CHEMOTHERAPY-INDUCED NEUTROPENIA: Primary | ICD-10-CM

## 2023-08-21 DIAGNOSIS — D70.1 CHEMOTHERAPY-INDUCED NEUTROPENIA: Primary | ICD-10-CM

## 2023-08-21 DIAGNOSIS — D63.0 ANEMIA IN NEOPLASTIC DISEASE: ICD-10-CM

## 2023-08-21 DIAGNOSIS — R53.82 CHRONIC FATIGUE: ICD-10-CM

## 2023-08-21 LAB
ALBUMIN SERPL BCP-MCNC: 3.4 G/DL (ref 3.5–5.2)
ALP SERPL-CCNC: 117 U/L (ref 55–135)
ALT SERPL W/O P-5'-P-CCNC: 16 U/L (ref 10–44)
ANION GAP SERPL CALC-SCNC: 8 MMOL/L (ref 8–16)
ANISOCYTOSIS BLD QL SMEAR: SLIGHT
AST SERPL-CCNC: 28 U/L (ref 10–40)
BASOPHILS # BLD AUTO: 0.02 K/UL (ref 0–0.2)
BASOPHILS NFR BLD: 0.9 % (ref 0–1.9)
BILIRUB SERPL-MCNC: 0.4 MG/DL (ref 0.1–1)
BUN SERPL-MCNC: 11 MG/DL (ref 8–23)
CALCIUM SERPL-MCNC: 9 MG/DL (ref 8.7–10.5)
CHLORIDE SERPL-SCNC: 101 MMOL/L (ref 95–110)
CO2 SERPL-SCNC: 25 MMOL/L (ref 23–29)
CREAT SERPL-MCNC: 0.7 MG/DL (ref 0.5–1.4)
DIFFERENTIAL METHOD: ABNORMAL
EOSINOPHIL # BLD AUTO: 0.1 K/UL (ref 0–0.5)
EOSINOPHIL NFR BLD: 3.9 % (ref 0–8)
ERYTHROCYTE [DISTWIDTH] IN BLOOD BY AUTOMATED COUNT: 15.5 % (ref 11.5–14.5)
EST. GFR  (NO RACE VARIABLE): >60 ML/MIN/1.73 M^2
GLUCOSE SERPL-MCNC: 70 MG/DL (ref 70–110)
HCT VFR BLD AUTO: 28.2 % (ref 40–54)
HGB BLD-MCNC: 9 G/DL (ref 14–18)
HYPOCHROMIA BLD QL SMEAR: ABNORMAL
IMM GRANULOCYTES # BLD AUTO: 0 K/UL (ref 0–0.04)
IMM GRANULOCYTES NFR BLD AUTO: 0 % (ref 0–0.5)
LDH SERPL L TO P-CCNC: 257 U/L (ref 110–260)
LYMPHOCYTES # BLD AUTO: 1.2 K/UL (ref 1–4.8)
LYMPHOCYTES NFR BLD: 51.1 % (ref 18–48)
MCH RBC QN AUTO: 27.7 PG (ref 27–31)
MCHC RBC AUTO-ENTMCNC: 31.9 G/DL (ref 32–36)
MCV RBC AUTO: 87 FL (ref 82–98)
MONOCYTES # BLD AUTO: 0.6 K/UL (ref 0.3–1)
MONOCYTES NFR BLD: 25.8 % (ref 4–15)
NEUTROPHILS # BLD AUTO: 0.4 K/UL (ref 1.8–7.7)
NEUTROPHILS NFR BLD: 18.3 % (ref 38–73)
NRBC BLD-RTO: 0 /100 WBC
OVALOCYTES BLD QL SMEAR: ABNORMAL
PLATELET # BLD AUTO: 174 K/UL (ref 150–450)
PLATELET BLD QL SMEAR: ABNORMAL
PMV BLD AUTO: 12.3 FL (ref 9.2–12.9)
POIKILOCYTOSIS BLD QL SMEAR: SLIGHT
POLYCHROMASIA BLD QL SMEAR: ABNORMAL
POTASSIUM SERPL-SCNC: 4.4 MMOL/L (ref 3.5–5.1)
PROT SERPL-MCNC: 6.4 G/DL (ref 6–8.4)
RBC # BLD AUTO: 3.25 M/UL (ref 4.6–6.2)
SCHISTOCYTES BLD QL SMEAR: ABNORMAL
SODIUM SERPL-SCNC: 134 MMOL/L (ref 136–145)
WBC # BLD AUTO: 2.29 K/UL (ref 3.9–12.7)

## 2023-08-21 PROCEDURE — 3078F DIAST BP <80 MM HG: CPT | Mod: HCNC,CPTII,S$GLB, | Performed by: NURSE PRACTITIONER

## 2023-08-21 PROCEDURE — 3008F PR BODY MASS INDEX (BMI) DOCUMENTED: ICD-10-PCS | Mod: HCNC,CPTII,S$GLB, | Performed by: NURSE PRACTITIONER

## 2023-08-21 PROCEDURE — 1159F PR MEDICATION LIST DOCUMENTED IN MEDICAL RECORD: ICD-10-PCS | Mod: HCNC,CPTII,S$GLB, | Performed by: NURSE PRACTITIONER

## 2023-08-21 PROCEDURE — 3288F FALL RISK ASSESSMENT DOCD: CPT | Mod: HCNC,CPTII,S$GLB, | Performed by: NURSE PRACTITIONER

## 2023-08-21 PROCEDURE — 3008F BODY MASS INDEX DOCD: CPT | Mod: HCNC,CPTII,S$GLB, | Performed by: NURSE PRACTITIONER

## 2023-08-21 PROCEDURE — 99999 PR PBB SHADOW E&M-EST. PATIENT-LVL IV: ICD-10-PCS | Mod: PBBFAC,HCNC,, | Performed by: NURSE PRACTITIONER

## 2023-08-21 PROCEDURE — 1126F AMNT PAIN NOTED NONE PRSNT: CPT | Mod: HCNC,CPTII,S$GLB, | Performed by: NURSE PRACTITIONER

## 2023-08-21 PROCEDURE — 85025 COMPLETE CBC W/AUTO DIFF WBC: CPT | Mod: HCNC | Performed by: NURSE PRACTITIONER

## 2023-08-21 PROCEDURE — 99215 OFFICE O/P EST HI 40 MIN: CPT | Mod: HCNC,S$GLB,, | Performed by: NURSE PRACTITIONER

## 2023-08-21 PROCEDURE — 36415 COLL VENOUS BLD VENIPUNCTURE: CPT | Mod: HCNC | Performed by: NURSE PRACTITIONER

## 2023-08-21 PROCEDURE — 83615 LACTATE (LD) (LDH) ENZYME: CPT | Mod: HCNC | Performed by: NURSE PRACTITIONER

## 2023-08-21 PROCEDURE — 3074F SYST BP LT 130 MM HG: CPT | Mod: HCNC,CPTII,S$GLB, | Performed by: NURSE PRACTITIONER

## 2023-08-21 PROCEDURE — 4010F PR ACE/ARB THEARPY RXD/TAKEN: ICD-10-PCS | Mod: HCNC,CPTII,S$GLB, | Performed by: NURSE PRACTITIONER

## 2023-08-21 PROCEDURE — 3078F PR MOST RECENT DIASTOLIC BLOOD PRESSURE < 80 MM HG: ICD-10-PCS | Mod: HCNC,CPTII,S$GLB, | Performed by: NURSE PRACTITIONER

## 2023-08-21 PROCEDURE — 4010F ACE/ARB THERAPY RXD/TAKEN: CPT | Mod: HCNC,CPTII,S$GLB, | Performed by: NURSE PRACTITIONER

## 2023-08-21 PROCEDURE — 99999 PR PBB SHADOW E&M-EST. PATIENT-LVL IV: CPT | Mod: PBBFAC,HCNC,, | Performed by: NURSE PRACTITIONER

## 2023-08-21 PROCEDURE — 1159F MED LIST DOCD IN RCRD: CPT | Mod: HCNC,CPTII,S$GLB, | Performed by: NURSE PRACTITIONER

## 2023-08-21 PROCEDURE — 80053 COMPREHEN METABOLIC PANEL: CPT | Mod: HCNC | Performed by: NURSE PRACTITIONER

## 2023-08-21 PROCEDURE — 1101F PR PT FALLS ASSESS DOC 0-1 FALLS W/OUT INJ PAST YR: ICD-10-PCS | Mod: HCNC,CPTII,S$GLB, | Performed by: NURSE PRACTITIONER

## 2023-08-21 PROCEDURE — 1101F PT FALLS ASSESS-DOCD LE1/YR: CPT | Mod: HCNC,CPTII,S$GLB, | Performed by: NURSE PRACTITIONER

## 2023-08-21 PROCEDURE — 3074F PR MOST RECENT SYSTOLIC BLOOD PRESSURE < 130 MM HG: ICD-10-PCS | Mod: HCNC,CPTII,S$GLB, | Performed by: NURSE PRACTITIONER

## 2023-08-21 PROCEDURE — 3288F PR FALLS RISK ASSESSMENT DOCUMENTED: ICD-10-PCS | Mod: HCNC,CPTII,S$GLB, | Performed by: NURSE PRACTITIONER

## 2023-08-21 PROCEDURE — 1126F PR PAIN SEVERITY QUANTIFIED, NO PAIN PRESENT: ICD-10-PCS | Mod: HCNC,CPTII,S$GLB, | Performed by: NURSE PRACTITIONER

## 2023-08-21 PROCEDURE — 99215 PR OFFICE/OUTPT VISIT, EST, LEVL V, 40-54 MIN: ICD-10-PCS | Mod: HCNC,S$GLB,, | Performed by: NURSE PRACTITIONER

## 2023-08-21 RX ORDER — LEVOFLOXACIN 500 MG/1
500 TABLET, FILM COATED ORAL DAILY
Qty: 7 TABLET | Refills: 0 | Status: SHIPPED | OUTPATIENT
Start: 2023-08-21 | End: 2023-08-21

## 2023-08-21 RX ORDER — LEVOFLOXACIN 500 MG/1
500 TABLET, FILM COATED ORAL DAILY
Qty: 30 TABLET | Refills: 0 | Status: SHIPPED | OUTPATIENT
Start: 2023-08-21 | End: 2023-09-05

## 2023-08-21 NOTE — PROGRESS NOTES
CC: DLBCL, hematology follow up    HPI: , 68, M, is here for hematology follow up. He has diffuse large B cell lymphoma. He has GERD, HLD, HTn. He presented to outside hospital in 2023 with epigastric pain.  He was found to have an esophageal mass and was transferred to [facility] for further care. He underwent endoscopy with biopsy of GI mass . He remained stable on liquid diet.     He has lost ~30 lbs in the past 6 months. Denies fevers, night sweats, upper/lower GI bleeding.   Interval History: He is here for follow up following completion of chemo therapy. EOT PET CT performed, pending upper endo.  He feels good, no c/o.    Previously reported abdominal pain resolved. No GI issues now.  Weight remains stable.  Denies fever, chills, black stools, hematochezia, heron.     Review of patient's allergies indicates:   Allergen Reactions    Amoxicillin Hives and Rash       Current Outpatient Medications   Medication Sig    acyclovir (ZOVIRAX) 400 MG tablet Take 1 tablet (400 mg total) by mouth 2 (two) times daily.    allopurinoL (ZYLOPRIM) 100 MG tablet Take 3 tablets by mouth once daily    azelastine (ASTELIN) 137 mcg (0.1 %) nasal spray 1 spray (137 mcg total) by Nasal route 2 (two) times daily.    HYDROcodone-acetaminophen (LORTAB ELIXIR)  mg/15 mL Soln Take 15 mLs by mouth every 4 (four) hours as needed (pain).    KENALOG 40 mg/mL injection     LIDOcaine-prilocaine (EMLA) cream Apply topically as needed.    lisinopriL (PRINIVIL,ZESTRIL) 40 MG tablet Take 1 tablet (40 mg total) by mouth once daily.    magnesium hydroxide 400 mg/5 ml (MILK OF MAGNESIA) 400 mg/5 mL Susp Take 30 mLs (2,400 mg total) by mouth daily as needed.    magnesium oxide (MAG-OX) 400 mg (241.3 mg magnesium) tablet Take 1 tablet (400 mg total) by mouth once daily.    morphine 10 mg/5 mL solution SMARTSI.5 Milliliter(s) By Mouth Every 6 Hours PRN    omeprazole (PRILOSEC) 40 MG capsule Take 1 capsule (40 mg total) by mouth 2  (two) times daily before meals.    ondansetron (ZOFRAN) 8 MG tablet Take 1 tablet (8 mg total) by mouth every 8 (eight) hours as needed for Nausea.    potassium chloride SA (K-DUR,KLOR-CON) 20 MEQ tablet Take 1 tablet (20 mEq total) by mouth 3 (three) times daily.    predniSONE (DELTASONE) 50 MG Tab Take 2 tablets (100 mg total) by mouth once daily. Take on days 2-5 of your chemotherapy cycles.    promethazine (PHENERGAN) 12.5 MG Tab Take 2 tablets (25 mg total) by mouth every 6 (six) hours as needed (Severe Nausea).    sildenafiL (VIAGRA) 100 MG tablet Take 1 tablet (100 mg total) by mouth as needed for Erectile Dysfunction.    simethicone (MYLICON) 80 MG chewable tablet Chew and swallow 1 tablet (80 mg total) by mouth 2 hours after meals and at bedtime.    allopurinoL (ZYLOPRIM) 100 MG tablet Take 3 tablets by mouth once daily    levoFLOXacin (LEVAQUIN) 500 MG tablet Take 1 tablet (500 mg total) by mouth once daily.    predniSONE (DELTASONE) 50 MG Tab Take 2 tablets (100 mg total) by mouth As instructed. Take 100 mg daily in the morning for 5 days once every 21 days     No current facility-administered medications for this visit.          Review of Systems   Constitutional:  Positive for malaise/fatigue (improving) and weight loss (remains stable now). Negative for chills, diaphoresis and fever.   HENT:  Negative for congestion, ear pain, hearing loss, nosebleeds, sinus pain and tinnitus.    Eyes:  Negative for blurred vision, double vision, photophobia, pain and discharge.   Respiratory:  Negative for cough, hemoptysis and sputum production.    Cardiovascular:  Negative for palpitations, orthopnea, claudication and leg swelling.   Gastrointestinal:  Positive for abdominal pain (resolved), constipation (resolved), heartburn (improved) and nausea (occasionally). Negative for blood in stool, diarrhea, melena and vomiting.   Genitourinary:  Negative for dysuria, frequency, hematuria and urgency.   Musculoskeletal:   Negative for back pain, myalgias and neck pain.   Neurological:  Negative for tingling, tremors, focal weakness, weakness and headaches.   Endo/Heme/Allergies:  Negative for environmental allergies.   Psychiatric/Behavioral:  Negative for depression, hallucinations and substance abuse. The patient is not nervous/anxious and does not have insomnia.             Vitals:    08/21/23 0803   BP: 125/78   Pulse: 83   Resp: 18   Temp: 97.7 °F (36.5 °C)         PS: ECOG 2    Physical Exam  Constitutional:       Appearance: Normal appearance.   HENT:      Head: Normocephalic and atraumatic.      Mouth/Throat:      Pharynx: No posterior oropharyngeal erythema.   Eyes:      General: No scleral icterus.  Cardiovascular:      Rate and Rhythm: Normal rate and regular rhythm.      Heart sounds: No murmur heard.  Pulmonary:      Effort: Pulmonary effort is normal. No respiratory distress.      Breath sounds: Normal breath sounds. No rhonchi.   Abdominal:      General: There is no distension.      Palpations: There is no mass.      Tenderness: There is no abdominal tenderness.   Musculoskeletal:         General: No swelling.   Lymphadenopathy:      Cervical: No cervical adenopathy.   Skin:     Coloration: Skin is not jaundiced.   Neurological:      General: No focal deficit present.      Mental Status: He is alert and oriented to person, place, and time.      Cranial Nerves: No cranial nerve deficit.          2/18/23 CT abdomen pelvis with contrast    COMPARISON:  None     FINDINGS:  Abdomen:     - Lung bases: There is focal emphysematous change in the medial left lung base.  Lung bases contain mild areas of peripheral chronic atelectasis.  A focus of suspected atelectasis simulates a nodular opacity at the right dome of the diaphragm.     There is distal esophageal distension with fluid secondary to and infiltrative type mass involving the visualized cardiac portion of the stomach extending to the GE junction concerning for  malignancy.  There is a mantle of lobular adenopathy surrounding the proximal abdominal aorta appearing to involve the retrocrural lymph nodes and adjacent periaortic and pericaval lymph nodes to the level of the left renal vein with the renal a vein somewhat displaced.  No visible renal vein invasion/thrombosis.  Smaller shotty type lymph nodes are seen caudal to the renal veins in the retroperitoneum.     - Liver: The liver appears homogeneous and not significantly enlarged.     - Gallbladder: There is no CT evidence of cholecystitis or cholelithiasis.     - Bile Ducts: No evidence of intra or extra hepatic biliary ductal dilation.     - Spleen: Negative.     - Kidneys: The kidneys enhance symmetrically and homogeneously.     - Adrenals: Unremarkable.     - Pancreas: Pancreas is displaced anteriorly by the retroperitoneal adenopathy with an ill-defined mass around 3 cm in diameter in the body appearing contiguous with the pancreatic parenchyma and similar in density to the retroperitoneal adenopathy consistent with involvement/invasion.  Additionally there is no fat plane margin between the posterior pancreatic body and the lobular retroperitoneal mantle of adenopathy.  Heterogeneous enhancement of the body of the pancreas in this region and poor visualization of the splenic artery and vein within the body suggests vessel encasement/involvement.  Partial thrombosis of portions of these vessels is suspected.     -Vascular: No abdominal aortic aneurysm or significant atherosclerosis.     - Abdominal wall:  There is a small right inguinal hernia.     Small bowel and colon: There is no small bowel distension.  There is no mesenteric convincing adenopathy.  Shotty lymph nodes are noted.  No extraluminal free air or free fluid is seen in the abdomen or pelvis.  Appendix is not isolated as a separate structure however there is no evidence of appendicitis.  Few scattered diverticuli are seen distally in the colon.  No  diverticulitis.     Pelvis:     There is no pelvic mass, adenopathy, or free fluid.  Bladder is unremarkable noting mild thickening of the mucosa can be explained by under distension.  The prostate is borderline mildly prominent.     Bones:  No acute osseous abnormality and no suspicious lytic or blastic lesion.     Impression:     Bulky infiltrative type mass compatible with malignancy involving the gastric cardiac portion extending to the GE junction with esophageal high-grade obstruction .  Further evaluation can be made with CT chest.     Bulky adenopathy in the retroperitoneum in the periaortic and pericaval region to the level of the left renal vein also appearing to involve the dorsal pancreatic body and also appearing to involve and obstruct the splenic artery and vein as described.     Incidental additional nonacute findings are discussed in the body of the report.       2/20/23 upper GI EUS     --One extrinsic moderate stenosis was found at the gastroesophageal junction. The stenosis was traversed.      --  A large, fungating and infiltrative mass with oozing bleeding was found in the cardia, in the gastric fundus and in the gastric body.        --Biopsies were taken with a cold forceps for histology.        --The examined duodenum was normal.          ENDOSONOGRAPHIC FINDING:          -- The esophagus and adjacent structures were visualized endosonographically.      --  A hypoechoic mass was identified endosonographically in the cardia of the stomach and in the fundus of the stomach (seen from the GE  junction, as EUS scope could not traverse the area of extrinsic        stenosis). The mass measured 52 mm by 62 mm in maximal cross-sectional diameter.     Impression:            - Extrinsic narrowing of the esophagus.                          - Gastric tumor in the cardia, in the gastric fundus and in the gastric body. Biopsied.                          - Normal examined duodenum.         1. GASTRIC MASS  BIOPSY:            -  Diffuse large B-cell lymphoma, non-germinal center subtype          -  High proliferation index (99% by Ki-67 immunostain)          -  Negative for CD30 co-expression by immunostain          -  TREY JANA positive for EBV-encoded small mRNAs in  few scattered   bystander lymphocytes          -   PENDING  (send-out): MYC tech only immunostain and Double/Triple Hit Lymphoma FISH panel with results to be reported in a separate supplemental and final diagnosis may be further classified             at that time   COMMENT: No flow cytometric analysis was performed on this specimen    Low and high power examination reveal minute irregular fragments of gastric tissue with an atypical diffuse submucosal infiltrate with focal crush artifacts and composed predominantly of large lymphoma cells with occasional   prominent central nucleoli and focal areas with increased scattered mature eosinophils and rarer scattered neutrophils, histiocytes, small mature lymphocytes, and plasma cells.   AE1/AE3, Synaptophysin, and Chromogranin immunostains were performed with adequate controls on block 1A by the referring surgical pathologist who   initially reviewed this biopsy and are negative for carcinoma or infiltrating neuroendocrine cells.   Immunohistochemical study with stains for CD3, CD5, BCL-2, CD20, CD23, Cyclin D1, CD10, BCL-6, MUM-1, CD30, and Ki-67 and EBV-encoded small mRNAs in-situ hybridization (TREY JANA) were performed on block 1A with appropriate controls   for increased sensitivity and cellular localization within the cells of interest.  Unstained slides of block 1A were sent out to [facility] for MYC tech only immunostain with results to be interpreted at [facility] and Double/Triple Hit Lymphoma FISH panel.   The large lymphoma cells are positive for CD20 and MUM-1 (nuclear) and negative for AE1/AE3, Synaptophysin, Chromogranin, CD3, CD5, CD23, Cyclin D1    (nuclear), CD10 (dim <30%), BCL-6 (strong nuclear <30%), and CD30. Ki-67 (nuclear) highlights a high proliferation index (99%). TREY JANA stains few scattered small and medium-sized bystander lymphocytes.   AE1/A3 stains mucosal and glandular epithelial cells. Synaptophysin and/or Chromogranin stain a rare subset of normal neuroendocrine cells associated   with the glandular epithelial cells.   CD3, CD5, and BCL-2 co-stain numerous scattered reactive T-cells within the focal non-involved areas of the biopsy. CD5 also appears to stain glandular   epithelial cells and a rare subset of the mucosal epithelial cells. Cyclin D1 (nuclear) stains epithelial and endothelial cells. CD10 stains stromal cells and few scattered neutrophils. Rare scattered medium-sized CD30+ cells (favor   reactive immunoblasts) are identified      3/8/23 hemoglobin electrophoresis: No electrophoretic evidence of abnormal hemoglobin or beta thalassemia. See comment    3/23/23 PET CT      COMPARISON:  CT abdomen pelvis 02/18/2023     FINDINGS:  Quality of the study: Adequate.     Reference values:     Mediastinal blood pool maximum SUV: 1.7     Normal liver background maximum SUV: 1.4     There is lymphadenopathy above below the diaphragm.     In the head and neck, there are no hypermetabolic lesions worrisome for malignancy. There are no hypermetabolic mucosal lesions, and there are no pathologically enlarged or hypermetabolic lymph nodes.     In the chest, there is mediastinal lymphadenopathy e.g. subcarinal node measuring 1.0 cm with SUV max 28 (image 70) and left upper paratracheal node measuring 0.6 cm with SUV max 12 (image 41).     Small left pleural effusion.  No concerning pulmonary nodules or masses.     In the abdomen and pelvis, there is marked diffuse stomach wall thickening with intense uptake, SUV max 42.  Large wanda conglomerate in the mid abdomen with SUV max 36 (axial fused image 125), difficult to delineate from surrounding  bowel and vessels CT.  Additional scattered hypermetabolic lymphadenopathy involving retrocrural nodes, mesenteric nodes, and omental nodules.     There is physiologic tracer distribution within the abdominal organs and excretion into the genitourinary system including probable pooling of excreted tracer within the penile urethra.     The spleen has normal uptake and is nonenlarged at 5 cm in craniocaudal dimension.     In the bones, there are no hypermetabolic lesions worrisome for malignancy.     In the extremities, there are no hypermetabolic lesions worrisome for malignancy.     Incidental CT findings: Right chest port with catheter tip in the right atrium.  Multi-vessel coronary calcifications.     Impression:     1.  In this patient with lymphoma, there is hypermetabolic lymphadenopathy above and below the diaphragm, well as an infiltrative stomach mass consistent with extranodal disease.  Significant lymphadenopathy below the diaphragm is difficult to delineate without intravenous contrast, and bulky disease is not excluded.       3/28/23 ECHO    Normal central venous pressure (3 mmHg).  The estimated PA systolic pressure is 40 mmHg.  The left ventricle is normal in size with normal systolic function.  The estimated ejection fraction is 60%.  Grade I left ventricular diastolic dysfunction.  Normal right ventricular size with normal right ventricular systolic function.  Mild to moderate tricuspid regurgitation.  Small pericardial effusion.      3/31/23 CSF cytology: Negative for malignant cells  Lab Results   Component Value Date    WBC 2.29 (L) 08/21/2023    HGB 9.0 (L) 08/21/2023    HCT 28.2 (L) 08/21/2023    MCV 87 08/21/2023     08/21/2023       CMP  Sodium   Date Value Ref Range Status   08/21/2023 134 (L) 136 - 145 mmol/L Final     Potassium   Date Value Ref Range Status   08/21/2023 4.4 3.5 - 5.1 mmol/L Final     Chloride   Date Value Ref Range Status   08/21/2023 101 95 - 110 mmol/L Final      CO2   Date Value Ref Range Status   08/21/2023 25 23 - 29 mmol/L Final     Glucose   Date Value Ref Range Status   08/21/2023 70 70 - 110 mg/dL Final     BUN   Date Value Ref Range Status   08/21/2023 11 8 - 23 mg/dL Final     Creatinine   Date Value Ref Range Status   08/21/2023 0.7 0.5 - 1.4 mg/dL Final     Calcium   Date Value Ref Range Status   08/21/2023 9.0 8.7 - 10.5 mg/dL Final     Total Protein   Date Value Ref Range Status   08/21/2023 6.4 6.0 - 8.4 g/dL Final     Albumin   Date Value Ref Range Status   08/21/2023 3.4 (L) 3.5 - 5.2 g/dL Final     Total Bilirubin   Date Value Ref Range Status   08/21/2023 0.4 0.1 - 1.0 mg/dL Final     Comment:     For infants and newborns, interpretation of results should be based  on gestational age, weight and in agreement with clinical  observations.    Premature Infant recommended reference ranges:  Up to 24 hours.............<8.0 mg/dL  Up to 48 hours............<12.0 mg/dL  3-5 days..................<15.0 mg/dL  6-29 days.................<15.0 mg/dL       Alkaline Phosphatase   Date Value Ref Range Status   08/21/2023 117 55 - 135 U/L Final     AST   Date Value Ref Range Status   08/21/2023 28 10 - 40 U/L Final     ALT   Date Value Ref Range Status   08/21/2023 16 10 - 44 U/L Final     Anion Gap   Date Value Ref Range Status   08/21/2023 8 8 - 16 mmol/L Final     eGFR   Date Value Ref Range Status   08/21/2023 >60.0 >60 mL/min/1.73 m^2 Final     1. Diffuse large B-cell lymphoma of lymph nodes of multiple regions  Rapid BMT CBC with Diff    Comprehensive Metabolic Panel    Lactate Dehydrogenase    Rapid BMT CBC with Diff    Comprehensive Metabolic Panel    Lactate Dehydrogenase      2. Immunocompromised state        3. Chronic fatigue        4. Gastroesophageal reflux disease, unspecified whether esophagitis present        5. 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 DLBCL, non-GC type of multiple sites  2. DLBCL of stomach  3. Fatigue  4. Microcytic anemia/neutropenia   5. Htn, primary  6. HLD  7.GERD  8. Cancer associated pain  9. Constipation  10. Weight loss  11. Malnutrition  12. Hypokalemia  13. hyponatremia      Plan:    1,2: I discussed the diagnosis, prognosis of diffuse large B cell lymphoma. I explained that the treatment is with multi-agent chemotherapy. Staging with PETCT, BM biopsy.  FISH pending. Ki 67 high. He will have CBC, CMP, LDH, uric acid, G6PD, HBV, HIV serology checked. He will start allopurinol 300mg daily to prevent tumor lysis syndrome.  PET CT  on 3/23/23 demonstrated hypermetabolic lymphadenopathy above and below the diaphragm, well as an infiltrative stomach mass consistent with extranodal disease. ECHO showed normal LVEF on 3/28/23.   NCCN IPI score 6, suggesting high risk disease, with estimated 5 year PFS of 30% and estimated 5 year OS of 33%.   Cycle 1 chemotherapy was in-patient, on 3/29/23. Rituximab was omitted from cycle 1 due to risk of GI perforation. IT MTX administered with cycle 1 CHOP as CNS IPI was intermediate/ high. No malignant cells identified in CSF on 3/31/23.   He completed 6 Cycles of CHOP(07/13/23). Mid treatment PET CT with positive response to treatment(05/30/23).  EOT PET(07/26/23)-  Small focus of persistent mild radiotracer avidity within the distal gastric wall however decreased compared to prior exam.  In the abdomen and pelvis, there is a small focus of persistent mild radiotracer avidity within the distal gastric wall demonstrating maximum SUV of 3.1 (previously 5.4).No new hypermetabolic lesion. upper endoscopy  not scheduled yet, messaged GI to schedule asap.       4. TIBC low, ferritin normal,. Saturated iron 12% on 2/19/23.  hemoglobin electrophoresis showed normal pattern on 3/8/23. Neutropenia with , continue acyclovir, ordered prophy levaquin    5,6: He follows with      7. He is on omeprazole BID    8. He is on morphine 15mg liquid by mouth as needed, q 6hour. He is aware of the risks associated with morphine, including drowsiness, risk of falls, and constipation. He is aware he cannot drive if he is using morphine.  Pain resolved now, not taking pain meds at this time    9. He has poor oral intake. He has bowel sounds on examination. He will try miralax. Constipation resolved. Appetite improved now    10, 11. Secondary to poor oral intake. He is on liquid diet. Encouraged use of ENSURE three times daily.  - Improved food intake     12. He will take oral potassium. Taking potassium 20 meq TID. Reported stomach upset , he will reduce to 1 tab a day and dietary adjustments.  Today's K+ wnl      13. Mild, encouraged increased hydration. Improved from previous labs         BMT Chart Routing  Urgent    Follow up with physician .  following EGD . schedule 2 weeks from upper EGD   Follow up with BRIAN    Provider visit type    Infusion scheduling note    Injection scheduling note    Labs   Scheduling:  Preferred lab:  Lab interval:  Repeated cbc in a week. cbc, cmp, LDH prior MD visit   Imaging   Please f/u upper EGD, need to schedule asap   Pharmacy appointment    Other referrals               Advance Care Planning     Date: 08/21/2023   We previously had discussions regarding his underlying diagnosis, natural history, prognosis, and treatment options.  He was interested in pursuing any and all treatment options available to him, including current chemo therapy. Completed treatment and tolerated well.  Will continue monitor for late chemo effect.   Total time of this visit was 30 minutes, including time spent face to face with patient and also in preparing for today's visit for MDM and documentation. (Medical Decision Making, including consideration of possible diagnoses, management options, complex medical record review, review of diagnostic tests and information,  consideration and discussion of significant complications based on comorbidities, and discussion with providers involved with the care of the patient). Greater than 50% was spent face to face with the patient counseling and coordinating care.    Nicole Dia NP  Hematology/Oncology/BMT

## 2023-08-22 ENCOUNTER — TELEPHONE (OUTPATIENT)
Dept: ENDOSCOPY | Facility: HOSPITAL | Age: 68
End: 2023-08-22
Payer: MEDICARE

## 2023-08-22 NOTE — TELEPHONE ENCOUNTER
Received message that called to schedule for an endoscopy procedure.   
Spoke to patient to schedule procedure(s) Upper Endoscopy Ultrasound (EUS)       Physician to perform procedure(s) Dr. ROSAMARIA Cornell  Date of Procedure (s) 8/23/23  Arrival Time 11:30 AM  Time of Procedure(s) 12:30 PM   Location of Procedure(s) [address]  Type of Rx Prep sent to patient: N/A  Instructions provided to patient via [facility]    Patient was informed on the following information and verbalized understanding. Screening questionnaire reviewed with patient and complete. If procedure requires anesthesia, a responsible adult needs to be present to accompany the patient home, patient cannot drive after receiving anesthesia. Appointment details are tentative, especially check-in time. Patient will receive a prep-op call 4 days prior to confirm check-in time for procedure. If applicable the patient should contact their pharmacy to verify Rx for procedure prep is ready for pick-up. Patient was advised to call the scheduling department at [phone] if pharmacy states no Rx is available. Patient was advised to call the endoscopy scheduling department if any questions or concerns arise.      [facility] Scheduling Department      
No

## 2023-08-23 ENCOUNTER — ANESTHESIA (OUTPATIENT)
Dept: ENDOSCOPY | Facility: HOSPITAL | Age: 68
End: 2023-08-23
Payer: MEDICARE

## 2023-08-23 ENCOUNTER — ANESTHESIA EVENT (OUTPATIENT)
Dept: ENDOSCOPY | Facility: HOSPITAL | Age: 68
End: 2023-08-23
Payer: MEDICARE

## 2023-08-23 ENCOUNTER — HOSPITAL ENCOUNTER (OUTPATIENT)
Facility: HOSPITAL | Age: 68
Discharge: HOME | End: 2023-08-23
Attending: INTERNAL MEDICINE | Admitting: INTERNAL MEDICINE
Payer: MEDICARE

## 2023-08-23 DIAGNOSIS — R93.89 ABNORMAL CT SCAN: ICD-10-CM

## 2023-08-23 PROCEDURE — D9220A PRA ANESTHESIA: Mod: HCNC,CRNA,, | Performed by: NURSE ANESTHETIST, CERTIFIED REGISTERED

## 2023-08-23 PROCEDURE — D9220A PRA ANESTHESIA: ICD-10-PCS | Mod: HCNC,ANES,, | Performed by: ANESTHESIOLOGY

## 2023-08-23 PROCEDURE — D9220A PRA ANESTHESIA: Mod: HCNC,ANES,, | Performed by: ANESTHESIOLOGY

## 2023-08-23 PROCEDURE — 37000009 HC ANESTHESIA EA ADD 15 MINS: Mod: HCNC | Performed by: INTERNAL MEDICINE

## 2023-08-23 PROCEDURE — 37000008 HC ANESTHESIA 1ST 15 MINUTES: Mod: HCNC | Performed by: INTERNAL MEDICINE

## 2023-08-23 PROCEDURE — 63600175 PHARM REV CODE 636 W HCPCS: Mod: HCNC | Performed by: NURSE ANESTHETIST, CERTIFIED REGISTERED

## 2023-08-23 PROCEDURE — D9220A PRA ANESTHESIA: ICD-10-PCS | Mod: HCNC,CRNA,, | Performed by: NURSE ANESTHETIST, CERTIFIED REGISTERED

## 2023-08-23 PROCEDURE — 43235 PR EGD, FLEX, DIAGNOSTIC: ICD-10-PCS | Mod: HCNC,,, | Performed by: INTERNAL MEDICINE

## 2023-08-23 PROCEDURE — 43235 EGD DIAGNOSTIC BRUSH WASH: CPT | Mod: HCNC,,, | Performed by: INTERNAL MEDICINE

## 2023-08-23 PROCEDURE — 25000003 PHARM REV CODE 250: Mod: HCNC | Performed by: NURSE ANESTHETIST, CERTIFIED REGISTERED

## 2023-08-23 PROCEDURE — 43235 EGD DIAGNOSTIC BRUSH WASH: CPT | Mod: HCNC | Performed by: INTERNAL MEDICINE

## 2023-08-23 PROCEDURE — 25000003 PHARM REV CODE 250: Mod: HCNC | Performed by: INTERNAL MEDICINE

## 2023-08-23 RX ORDER — SODIUM CHLORIDE 0.9 % (FLUSH) 0.9 %
3 SYRINGE (ML) INJECTION
Status: CANCELLED | OUTPATIENT
Start: 2023-08-23

## 2023-08-23 RX ORDER — PROPOFOL 10 MG/ML
VIAL (ML) INTRAVENOUS
Status: DISCONTINUED | OUTPATIENT
Start: 2023-08-23 | End: 2023-08-23

## 2023-08-23 RX ORDER — SODIUM CHLORIDE 9 MG/ML
INJECTION, SOLUTION INTRAVENOUS CONTINUOUS
Status: DISCONTINUED | OUTPATIENT
Start: 2023-08-23 | End: 2023-08-23 | Stop reason: HOSPADM

## 2023-08-23 RX ORDER — PROPOFOL 10 MG/ML
VIAL (ML) INTRAVENOUS CONTINUOUS PRN
Status: DISCONTINUED | OUTPATIENT
Start: 2023-08-23 | End: 2023-08-23

## 2023-08-23 RX ORDER — LIDOCAINE HYDROCHLORIDE 20 MG/ML
INJECTION INTRAVENOUS
Status: DISCONTINUED | OUTPATIENT
Start: 2023-08-23 | End: 2023-08-23

## 2023-08-23 RX ADMIN — PROPOFOL 50 MG: 10 INJECTION, EMULSION INTRAVENOUS at 12:08

## 2023-08-23 RX ADMIN — LIDOCAINE HYDROCHLORIDE 60 MG: 20 INJECTION INTRAVENOUS at 12:08

## 2023-08-23 RX ADMIN — PROPOFOL 150 MCG/KG/MIN: 10 INJECTION, EMULSION INTRAVENOUS at 12:08

## 2023-08-23 RX ADMIN — SODIUM CHLORIDE: 9 INJECTION, SOLUTION INTRAVENOUS at 11:08

## 2023-08-23 NOTE — PROVATION PATIENT INSTRUCTIONS
Discharge Summary/Instructions after an Endoscopic Procedure  Patient Name: Jeremy Martinez  Patient MRN: [id number]  Patient YOB: 1955  Wednesday, August 23, 2023  Ady Cornell MD  Dear patient,  As a result of recent federal legislation (The Federal Cures Act), you may   receive lab or pathology results from your procedure in your [facility]   account before your physician is able to contact you. Your physician or   their representative will relay the results to you with their   recommendations at their soonest availability.  Thank you,  RESTRICTIONS:  During your procedure today, you received medications for sedation.  These   medications may affect your judgment, balance and coordination.  Therefore,   for 24 hours, you have the following restrictions:   - DO NOT drive a car, operate machinery, make legal/financial decisions,   sign important papers or drink alcohol.    ACTIVITY:  Today: no heavy lifting, straining or running due to procedural   sedation/anesthesia.  The following day: return to full activity including work.  DIET:  Eat and drink normally unless instructed otherwise.     TREATMENT FOR COMMON SIDE EFFECTS:  - Mild abdominal pain, nausea, belching, bloating or excessive gas:  rest,   eat lightly and use a heating pad.  - Sore Throat: treat with throat lozenges and/or gargle with warm salt   water.  - Because air was used during the procedure, expelling large amounts of air   from your rectum or belching is normal.  - If a bowel prep was taken, you may not have a bowel movement for 1-3 days.    This is normal.  SYMPTOMS TO WATCH FOR AND REPORT TO YOUR PHYSICIAN:  1. Abdominal pain or bloating, other than gas cramps.  2. Chest pain.  3. Back pain.  4. Signs of infection such as: chills or fever occurring within 24 hours   after the procedure.  5. Rectal bleeding, which would show as bright red, maroon, or black stools.   (A tablespoon of blood from the rectum is not serious, especially if    hemorrhoids are present.)  6. Vomiting.  7. Weakness or dizziness.  GO DIRECTLY TO THE NEAREST EMERGENCY ROOM IF YOU HAVE ANY OF THE FOLLOWING:      Difficulty breathing              Chills and/or fever over 101 F   Persistent vomiting and/or vomiting blood   Severe abdominal pain   Severe chest pain   Black, tarry stools   Bleeding- more than one tablespoon   Any other symptom or condition that you feel may need urgent attention  Your doctor recommends these additional instructions:  If any biopsies were taken, your doctors clinic will contact you in 1 to 2   weeks with any results.  - Discharge patient to home (ambulatory).   - Resume previous diet; Discharge to home (ambulatory); Resume outpatient   medications  - Return to referring physician as previously scheduled.  For questions, problems or results please call your physician - Ady Cornell MD at Work:  [phone].  [facility], EMERGENCY ROOM PHONE NUMBER: [phone]  IF A COMPLICATION OR EMERGENCY SITUATION ARISES AND YOU ARE UNABLE TO REACH   YOUR PHYSICIAN - GO DIRECTLY TO THE EMERGENCY ROOM.  Ady Cornell MD  8/23/2023 1:01:33 PM  This report has been verified and signed electronically.  Dear patient,  As a result of recent federal legislation (The Federal Cures Act), you may   receive lab or pathology results from your procedure in your [facility]   account before your physician is able to contact you. Your physician or   their representative will relay the results to you with their   recommendations at their soonest availability.  Thank you,  PROVATION

## 2023-08-23 NOTE — H&P
Short Stay Endoscopy History and Physical    PCP - Jose Abbott MD  Referring Physician - Nicole Dia, NP  [address] [place] [address]    Procedure - EGD/EUS  ASA - per anesthesia  Mallampati - per anesthesia  History of Anesthesia problems - no  Family history Anesthesia problems -  no   Plan of anesthesia - General    HPI:  This is a 68 y.o. male here for evaluation of: abnl PET/CT    Reflux - no  Dysphagia - no  Abdominal pain - no  Diarrhea - no    ROS:  Constitutional: No fevers, chills, No weight loss  CV: No chest pain  Pulm: No cough, No shortness of breath  GI: see HPI    Medical History:  has a past medical history of Erectile dysfunction, GERD (gastroesophageal reflux disease) (2010), Hyperlipidemia (2008), and Hypertension (2007).    Surgical History:  has a past surgical history that includes Total knee arthroplasty (2010); Colonoscopy (N/A, 8/1/2017); Esophagogastroduodenoscopy (N/A, 2/20/2023); Endoscopic ultrasound of upper gastrointestinal tract (N/A, 2/20/2023); and Insertion of tunneled central venous catheter (CVC) with subcutaneous port (Right, 3/17/2023).    Family History: family history includes Diabetes in his mother; Kidney disease in his son; Stroke in his father..    Social History:  reports that he quit smoking about 23 years ago. His smoking use included cigarettes. He started smoking about 33 years ago. He has never used smokeless tobacco. He reports current alcohol use. He reports that he does not use drugs.    Review of patient's allergies indicates:   Allergen Reactions    Amoxicillin Hives and Rash       Medications:   Medications Prior to Admission   Medication Sig Dispense Refill Last Dose    levoFLOXacin (LEVAQUIN) 500 MG tablet Take 1 tablet (500 mg total) by mouth once daily. 30 tablet 0 Past Week    lisinopriL (PRINIVIL,ZESTRIL) 40 MG tablet Take 1 tablet (40 mg total) by mouth once daily. 90 tablet 11 8/22/2023    acyclovir (ZOVIRAX) 400 MG  tablet Take 1 tablet (400 mg total) by mouth 2 (two) times daily. 60 tablet 5     allopurinoL (ZYLOPRIM) 100 MG tablet Take 3 tablets by mouth once daily 90 tablet 0 Unknown    allopurinoL (ZYLOPRIM) 100 MG tablet Take 3 tablets by mouth once daily 90 tablet 3 Unknown    azelastine (ASTELIN) 137 mcg (0.1 %) nasal spray 1 spray (137 mcg total) by Nasal route 2 (two) times daily. 30 mL 3     HYDROcodone-acetaminophen (LORTAB ELIXIR)  mg/15 mL Soln Take 15 mLs by mouth every 4 (four) hours as needed (pain). 200 mL 0 Unknown    KENALOG 40 mg/mL injection        LIDOcaine-prilocaine (EMLA) cream Apply topically as needed. 30 g 0     magnesium hydroxide 400 mg/5 ml (MILK OF MAGNESIA) 400 mg/5 mL Susp Take 30 mLs (2,400 mg total) by mouth daily as needed. 118 mL 2     magnesium oxide (MAG-OX) 400 mg (241.3 mg magnesium) tablet Take 1 tablet (400 mg total) by mouth once daily. 60 tablet 0     morphine 10 mg/5 mL solution SMARTSI.5 Milliliter(s) By Mouth Every 6 Hours PRN   Unknown    omeprazole (PRILOSEC) 40 MG capsule Take 1 capsule (40 mg total) by mouth 2 (two) times daily before meals. 180 capsule 1 Unknown    ondansetron (ZOFRAN) 8 MG tablet Take 1 tablet (8 mg total) by mouth every 8 (eight) hours as needed for Nausea. 30 tablet 2     potassium chloride SA (K-DUR,KLOR-CON) 20 MEQ tablet Take 1 tablet (20 mEq total) by mouth 3 (three) times daily. 30 tablet 11 Unknown    predniSONE (DELTASONE) 50 MG Tab Take 2 tablets (100 mg total) by mouth As instructed. Take 100 mg daily in the morning for 5 days once every 21 days 60 tablet 0     predniSONE (DELTASONE) 50 MG Tab Take 2 tablets (100 mg total) by mouth once daily. Take on days 2-5 of your chemotherapy cycles. 60 tablet 0     promethazine (PHENERGAN) 12.5 MG Tab Take 2 tablets (25 mg total) by mouth every 6 (six) hours as needed (Severe Nausea). 30 tablet 2     sildenafiL (VIAGRA) 100 MG tablet Take 1 tablet (100 mg total) by mouth as needed for Erectile  Dysfunction. 12 tablet 1     simethicone (MYLICON) 80 MG chewable tablet Chew and swallow 1 tablet (80 mg total) by mouth 2 hours after meals and at bedtime. 120 tablet 2        Physical Exam:    Vital Signs:   Vitals:    08/23/23 1139   BP: 126/79   Pulse: 84   Resp: 16   Temp: 98 °F (36.7 °C)       General Appearance: Well appearing in no acute distress  Lungs: no labored breathing  CVS:  regular rate  Abdomen: non tender    Labs:  Lab Results   Component Value Date    WBC 2.29 (L) 08/21/2023    HGB 9.0 (L) 08/21/2023    HCT 28.2 (L) 08/21/2023     08/21/2023    CHOL 232 (H) 10/03/2022    TRIG 78 10/03/2022    HDL 58 10/03/2022    ALT 16 08/21/2023    AST 28 08/21/2023     (L) 08/21/2023    K 4.4 08/21/2023     08/21/2023    CREATININE 0.7 08/21/2023    BUN 11 08/21/2023    CO2 25 08/21/2023    TSH 1.45 09/28/2021    PSA 0.45 10/03/2022    INR 1.0 12/23/2020    HGBA1C 6.1 12/28/2011       I have explained the risks and benefits of this endoscopic procedure to the patient including but not limited to bleeding, inflammation, infection, perforation, and death.      Ady Cornell MD

## 2023-08-23 NOTE — ANESTHESIA PREPROCEDURE EVALUATION
08/23/2023  [facility]  Anesthesia Pre-Operative Evaluation         Patient Name: Jeremy Martinez  YOB: 1955  MRN: [id number]    SUBJECTIVE:     Pre-operative evaluation for Procedure(s) (LRB):  ESOPHAGOGASTRODUODENOSCOPY (EGD) (N/A)  ULTRASOUND, UPPER GI TRACT, ENDOSCOPIC (N/A)     08/23/2023    Jeremy Martinez is a 68 y.o. male     Full medical history listed below      LDA: None documented.    Prev airway: None documented.     GTTs: None documented.        Patient Active Problem List   Diagnosis    Hypertension    Hyperlipidemia    GERD (gastroesophageal reflux disease)    Erectile dysfunction    Anemia of unknown etiology    Screening for colon cancer    Esophageal mass    Chronic fatigue    Diffuse large B-cell lymphoma of lymph nodes of multiple regions    Epigastric abdominal pain    Nausea    Other constipation    Gas pain       Review of patient's allergies indicates:   Allergen Reactions    Amoxicillin Hives and Rash       Current Inpatient Medications:      No current facility-administered medications on file prior to encounter.     Current Outpatient Medications on File Prior to Encounter   Medication Sig Dispense Refill    lisinopriL (PRINIVIL,ZESTRIL) 40 MG tablet Take 1 tablet (40 mg total) by mouth once daily. 90 tablet 11    acyclovir (ZOVIRAX) 400 MG tablet Take 1 tablet (400 mg total) by mouth 2 (two) times daily. 60 tablet 5    allopurinoL (ZYLOPRIM) 100 MG tablet Take 3 tablets by mouth once daily 90 tablet 0    allopurinoL (ZYLOPRIM) 100 MG tablet Take 3 tablets by mouth once daily 90 tablet 3    azelastine (ASTELIN) 137 mcg (0.1 %) nasal spray 1 spray (137 mcg total) by Nasal route 2 (two) times daily. 30 mL 3    HYDROcodone-acetaminophen (LORTAB ELIXIR)  mg/15 mL Soln Take 15 mLs by mouth every 4 (four) hours as needed (pain). 200  mL 0    KENALOG 40 mg/mL injection       LIDOcaine-prilocaine (EMLA) cream Apply topically as needed. 30 g 0    magnesium hydroxide 400 mg/5 ml (MILK OF MAGNESIA) 400 mg/5 mL Susp Take 30 mLs (2,400 mg total) by mouth daily as needed. 118 mL 2    magnesium oxide (MAG-OX) 400 mg (241.3 mg magnesium) tablet Take 1 tablet (400 mg total) by mouth once daily. 60 tablet 0    morphine 10 mg/5 mL solution SMARTSI.5 Milliliter(s) By Mouth Every 6 Hours PRN      omeprazole (PRILOSEC) 40 MG capsule Take 1 capsule (40 mg total) by mouth 2 (two) times daily before meals. 180 capsule 1    ondansetron (ZOFRAN) 8 MG tablet Take 1 tablet (8 mg total) by mouth every 8 (eight) hours as needed for Nausea. 30 tablet 2    potassium chloride SA (K-DUR,KLOR-CON) 20 MEQ tablet Take 1 tablet (20 mEq total) by mouth 3 (three) times daily. 30 tablet 11    predniSONE (DELTASONE) 50 MG Tab Take 2 tablets (100 mg total) by mouth As instructed. Take 100 mg daily in the morning for 5 days once every 21 days 60 tablet 0    predniSONE (DELTASONE) 50 MG Tab Take 2 tablets (100 mg total) by mouth once daily. Take on days 2-5 of your chemotherapy cycles. 60 tablet 0    promethazine (PHENERGAN) 12.5 MG Tab Take 2 tablets (25 mg total) by mouth every 6 (six) hours as needed (Severe Nausea). 30 tablet 2    sildenafiL (VIAGRA) 100 MG tablet Take 1 tablet (100 mg total) by mouth as needed for Erectile Dysfunction. 12 tablet 1    simethicone (MYLICON) 80 MG chewable tablet Chew and swallow 1 tablet (80 mg total) by mouth 2 hours after meals and at bedtime. 120 tablet 2       Past Surgical History:   Procedure Laterality Date    COLONOSCOPY N/A 2017    Procedure: COLONOSCOPY;  Surgeon: AMBER Restrepo MD;  Location: [address];  Service: Endoscopy;  Laterality: N/A;    ENDOSCOPIC ULTRASOUND OF UPPER GASTROINTESTINAL TRACT N/A 2023    Procedure: ULTRASOUND, UPPER GI TRACT, ENDOSCOPIC;  Surgeon: Ady Cornell MD;  Location:  "[facility] ENDO (2ND FLR);  Service: Endoscopy;  Laterality: N/A;    ESOPHAGOGASTRODUODENOSCOPY N/A 2023    Procedure: EGD (ESOPHAGOGASTRODUODENOSCOPY);  Surgeon: Ady Cornell MD;  Location: [facility] ENDO (2ND FLR);  Service: Endoscopy;  Laterality: N/A;    INSERTION OF TUNNELED CENTRAL VENOUS CATHETER (CVC) WITH SUBCUTANEOUS PORT Right 3/17/2023    Procedure: INSERTION, PORT-A-CATH;  Surgeon: Will Corrales II, MD;  Location: [facility] CATH LAB;  Service: Interventional Radiology;  Laterality: Right;    TOTAL KNEE ARTHROPLASTY  2010    right       Social History     Socioeconomic History    Marital status:    Tobacco Use    Smoking status: Former     Current packs/day: 0.00     Types: Cigarettes     Start date: 1990     Quit date: 2000     Years since quittin.1    Smokeless tobacco: Never   Substance and Sexual Activity    Alcohol use: Yes     Comment: beers 5 d per week, 3 at a time    Drug use: No   Social History Narrative     (in gen healthy), 5 kids. 4 grandkids. 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 No formal exercise but active. 1 son living with him.       OBJECTIVE:     Vital Signs Range (Last 24H):  Temp:  [36.7 °C (98 °F)]   Pulse:  [84]   Resp:  [16]   BP: (126)/(79)   SpO2:  [98 %]       CBC:   Recent Labs     23  0834   WBC 2.29*   RBC 3.25*   HGB 9.0*   HCT 28.2*      MCV 87   MCH 27.7   MCHC 31.9*       CMP:   Recent Labs     23  0834   *   K 4.4      CO2 25   BUN 11   CREATININE 0.7   GLU 70   CALCIUM 9.0   ALBUMIN 3.4*   PROT 6.4   ALKPHOS 117   ALT 16   AST 28   BILITOT 0.4       INR:  No results for input(s): "PT", "INR", "PROTIME", "APTT" in the last 72 hours.    Diagnostic Studies: No relevant studies.    EKG:   Results for orders placed or performed during the hospital encounter of 23   EKG 12-lead    Collection Time: 23  9:48 PM    Narrative    Test Reason : R07.9,    Vent. Rate : 088 BPM     Atrial Rate : 088 BPM     P-R Int " : 132 ms          QRS Dur : 080 ms      QT Int : 344 ms       P-R-T Axes : 061 021 -12 degrees     QTc Int : 416 ms    Sinus rhythm with occasional Premature ventricular complexes  Baseline wander  Nonspecific ST abnormality  Abnormal ECG  When compared with ECG of 23-DEC-2020 13:04,  Premature ventricular complexes are now Present  Premature atrial complexes are no longer Present  Inverted T waves have replaced nonspecific T wave abnormality in Inferior  leads  Confirmed by Jalen Collins MD ([id number]) on 2/22/2023 10:46:40 PM    Referred By: [email or web address]   SELF           Confirmed By:Jalen Collins MD        2D ECHO:   Results for orders placed during the hospital encounter of 03/28/23    Echo    Interpretation Summary  · Normal central venous pressure (3 mmHg).  · The estimated PA systolic pressure is 40 mmHg.  · The left ventricle is normal in size with normal systolic function.  · The estimated ejection fraction is 60%.  · Grade I left ventricular diastolic dysfunction.  · Normal right ventricular size with normal right ventricular systolic function.  · Mild to moderate tricuspid regurgitation.  · Small pericardial effusion.         ASSESSMENT/PLAN:           Pre-op Assessment    I have reviewed the Patient Summary Reports.    I have reviewed the NPO Status.   I have reviewed the Medications.     Review of Systems  Anesthesia Hx:  History of prior surgery of interest to airway management or planning: Denies Family Hx of Anesthesia complications.   Denies Personal Hx of Anesthesia complications.       Physical Exam  General: Well nourished, Cooperative, Alert and Oriented    Airway:  Mallampati: II   Mouth Opening: Normal  TM Distance: Normal  Tongue: Normal  Neck ROM: Normal ROM    Dental:  Intact    Chest/Lungs:  Clear to auscultation, Normal Respiratory Rate    Heart:  Rate: Normal  Rhythm: Regular Rhythm        Anesthesia Plan  Type of Anesthesia, risks & benefits discussed:    Anesthesia Type: Gen Natural  Airway  Intra-op Monitoring Plan: Standard ASA Monitors  Induction:  IV  Informed Consent: Informed consent signed with the Patient and all parties understand the risks and agree with anesthesia plan.  All questions answered.   ASA Score: 3  Day of Surgery Review of History & Physical: H&P Update referred to the surgeon/provider.    Ready For Surgery From Anesthesia Perspective.     .

## 2023-08-23 NOTE — TRANSFER OF CARE
"Anesthesia Transfer of Care Note    Patient: Jeremy Martinez    Procedure(s) Performed: Procedure(s) (LRB):  ESOPHAGOGASTRODUODENOSCOPY (EGD) (N/A)    Patient location: [facility]    Anesthesia Type: general    Transport from OR: Transported from OR on room air with adequate spontaneous ventilation    Post pain: adequate analgesia    Post assessment: no apparent anesthetic complications and tolerated procedure well    Post vital signs: stable    Level of consciousness: awake, alert and oriented    Nausea/Vomiting: no nausea/vomiting    Complications: none    Transfer of care protocol was followed      Last vitals:   Visit Vitals  /71   Pulse 73   Temp 36.7 °C (98 °F) (Temporal)   Resp 16   Ht 5' 6" (1.676 m)   Wt 55.8 kg (123 lb)   SpO2 100%   BMI 19.85 kg/m²     "

## 2023-08-24 NOTE — ANESTHESIA POSTPROCEDURE EVALUATION
Anesthesia Post Evaluation    Patient: Jeremy Martinez    Procedure(s) Performed: Procedure(s) (LRB):  ESOPHAGOGASTRODUODENOSCOPY (EGD) (N/A)    Final Anesthesia Type: general      Patient location during evaluation: PACU  Patient participation: Yes- Able to Participate  Level of consciousness: awake and alert and oriented  Pain management: adequate  Airway patency: patent    PONV status at discharge: No PONV  Anesthetic complications: no      Cardiovascular status: blood pressure returned to baseline and hemodynamically stable  Respiratory status: unassisted  Hydration status: euvolemic  Follow-up not needed.          Vitals Value Taken Time   /84 08/23/23 1317   Temp 36.6 °C (97.9 °F) 08/23/23 1240   Pulse 59 08/23/23 1318   Resp 22 08/23/23 1318   SpO2 90 % 08/23/23 1248   Vitals shown include unvalidated device data.      No case tracking events are documented in the log.      Pain/Mike Score: No data recorded

## 2023-08-27 ENCOUNTER — PATIENT MESSAGE (OUTPATIENT)
Dept: HEMATOLOGY/ONCOLOGY | Facility: CLINIC | Age: 68
End: 2023-08-27
Payer: MEDICARE

## 2023-08-28 VITALS
HEIGHT: 66 IN | TEMPERATURE: 98 F | SYSTOLIC BLOOD PRESSURE: 147 MMHG | BODY MASS INDEX: 19.77 KG/M2 | HEART RATE: 57 BPM | DIASTOLIC BLOOD PRESSURE: 83 MMHG | RESPIRATION RATE: 25 BRPM | WEIGHT: 123 LBS | OXYGEN SATURATION: 98 %

## 2023-08-29 ENCOUNTER — PATIENT MESSAGE (OUTPATIENT)
Dept: HEMATOLOGY/ONCOLOGY | Facility: CLINIC | Age: 68
End: 2023-08-29
Payer: MEDICARE

## 2023-09-05 ENCOUNTER — OFFICE VISIT (OUTPATIENT)
Dept: INTERNAL MEDICINE | Facility: CLINIC | Age: 68
End: 2023-09-05
Payer: MEDICARE

## 2023-09-05 VITALS
BODY MASS INDEX: 19.84 KG/M2 | DIASTOLIC BLOOD PRESSURE: 70 MMHG | SYSTOLIC BLOOD PRESSURE: 108 MMHG | WEIGHT: 123.44 LBS | HEART RATE: 77 BPM | OXYGEN SATURATION: 97 % | HEIGHT: 66 IN

## 2023-09-05 DIAGNOSIS — B35.1 ONYCHOMYCOSIS: Primary | ICD-10-CM

## 2023-09-05 DIAGNOSIS — E87.1 HYPONATREMIA: ICD-10-CM

## 2023-09-05 DIAGNOSIS — I10 ESSENTIAL HYPERTENSION: ICD-10-CM

## 2023-09-05 PROCEDURE — 99999 PR PBB SHADOW E&M-EST. PATIENT-LVL V: ICD-10-PCS | Mod: PBBFAC,HCNC,, | Performed by: INTERNAL MEDICINE

## 2023-09-05 PROCEDURE — 1126F AMNT PAIN NOTED NONE PRSNT: CPT | Mod: HCNC,CPTII,S$GLB, | Performed by: INTERNAL MEDICINE

## 2023-09-05 PROCEDURE — 1160F RVW MEDS BY RX/DR IN RCRD: CPT | Mod: HCNC,CPTII,S$GLB, | Performed by: INTERNAL MEDICINE

## 2023-09-05 PROCEDURE — 99214 OFFICE O/P EST MOD 30 MIN: CPT | Mod: HCNC,S$GLB,, | Performed by: INTERNAL MEDICINE

## 2023-09-05 PROCEDURE — 1159F PR MEDICATION LIST DOCUMENTED IN MEDICAL RECORD: ICD-10-PCS | Mod: HCNC,CPTII,S$GLB, | Performed by: INTERNAL MEDICINE

## 2023-09-05 PROCEDURE — 3008F BODY MASS INDEX DOCD: CPT | Mod: HCNC,CPTII,S$GLB, | Performed by: INTERNAL MEDICINE

## 2023-09-05 PROCEDURE — 4010F PR ACE/ARB THEARPY RXD/TAKEN: ICD-10-PCS | Mod: HCNC,CPTII,S$GLB, | Performed by: INTERNAL MEDICINE

## 2023-09-05 PROCEDURE — 3074F PR MOST RECENT SYSTOLIC BLOOD PRESSURE < 130 MM HG: ICD-10-PCS | Mod: HCNC,CPTII,S$GLB, | Performed by: INTERNAL MEDICINE

## 2023-09-05 PROCEDURE — 4010F ACE/ARB THERAPY RXD/TAKEN: CPT | Mod: HCNC,CPTII,S$GLB, | Performed by: INTERNAL MEDICINE

## 2023-09-05 PROCEDURE — 3078F DIAST BP <80 MM HG: CPT | Mod: HCNC,CPTII,S$GLB, | Performed by: INTERNAL MEDICINE

## 2023-09-05 PROCEDURE — 3288F PR FALLS RISK ASSESSMENT DOCUMENTED: ICD-10-PCS | Mod: HCNC,CPTII,S$GLB, | Performed by: INTERNAL MEDICINE

## 2023-09-05 PROCEDURE — 1126F PR PAIN SEVERITY QUANTIFIED, NO PAIN PRESENT: ICD-10-PCS | Mod: HCNC,CPTII,S$GLB, | Performed by: INTERNAL MEDICINE

## 2023-09-05 PROCEDURE — 1159F MED LIST DOCD IN RCRD: CPT | Mod: HCNC,CPTII,S$GLB, | Performed by: INTERNAL MEDICINE

## 2023-09-05 PROCEDURE — 1101F PR PT FALLS ASSESS DOC 0-1 FALLS W/OUT INJ PAST YR: ICD-10-PCS | Mod: HCNC,CPTII,S$GLB, | Performed by: INTERNAL MEDICINE

## 2023-09-05 PROCEDURE — 99999 PR PBB SHADOW E&M-EST. PATIENT-LVL V: CPT | Mod: PBBFAC,HCNC,, | Performed by: INTERNAL MEDICINE

## 2023-09-05 PROCEDURE — 1101F PT FALLS ASSESS-DOCD LE1/YR: CPT | Mod: HCNC,CPTII,S$GLB, | Performed by: INTERNAL MEDICINE

## 2023-09-05 PROCEDURE — 3008F PR BODY MASS INDEX (BMI) DOCUMENTED: ICD-10-PCS | Mod: HCNC,CPTII,S$GLB, | Performed by: INTERNAL MEDICINE

## 2023-09-05 PROCEDURE — 3288F FALL RISK ASSESSMENT DOCD: CPT | Mod: HCNC,CPTII,S$GLB, | Performed by: INTERNAL MEDICINE

## 2023-09-05 PROCEDURE — 3074F SYST BP LT 130 MM HG: CPT | Mod: HCNC,CPTII,S$GLB, | Performed by: INTERNAL MEDICINE

## 2023-09-05 PROCEDURE — 99214 PR OFFICE/OUTPT VISIT, EST, LEVL IV, 30-39 MIN: ICD-10-PCS | Mod: HCNC,S$GLB,, | Performed by: INTERNAL MEDICINE

## 2023-09-05 PROCEDURE — 3078F PR MOST RECENT DIASTOLIC BLOOD PRESSURE < 80 MM HG: ICD-10-PCS | Mod: HCNC,CPTII,S$GLB, | Performed by: INTERNAL MEDICINE

## 2023-09-05 PROCEDURE — 1160F PR REVIEW ALL MEDS BY PRESCRIBER/CLIN PHARMACIST DOCUMENTED: ICD-10-PCS | Mod: HCNC,CPTII,S$GLB, | Performed by: INTERNAL MEDICINE

## 2023-09-05 RX ORDER — LISINOPRIL 40 MG/1
40 TABLET ORAL DAILY
Qty: 90 TABLET | Refills: 11 | Status: SHIPPED | OUTPATIENT
Start: 2023-09-05 | End: 2023-10-12 | Stop reason: SDUPTHER

## 2023-09-05 NOTE — Clinical Note
Hi, please call him and offer a podiatry referral, I forgot to order it while he was here. Thank you, Jose Abbott

## 2023-09-05 NOTE — PROGRESS NOTES
Subjective:       Patient ID: Jeremy Martinez is a 68 y.o. male.    Chief Complaint: Follow-up (Lymphoma)    Patient is here for followup for chronic conditions.    Occ R arm shocking sensations, medial area. Not severe, symptoms last seconds at a time.  Also gets some neuropathy symptoms at this toes/feet bilat.    Would like to see podiatrist for nail care.    Has completed treatment for lymphoma and appears to be in remission.    No longer any dysphagia, appetite is picking up. Wt has been increasing slowly.      Review of Systems   Constitutional:  Negative for appetite change and unexpected weight change.   Respiratory:  Negative for chest tightness and shortness of breath.    Cardiovascular:  Negative for chest pain.   Gastrointestinal:  Negative for abdominal pain.   Genitourinary:  Negative for difficulty urinating, scrotal swelling and testicular pain.   Skin:         No lesions   Neurological:  Positive for numbness.           Objective:      Physical Exam  Vitals reviewed.   Constitutional:       General: He is not in acute distress.     Appearance: Normal appearance. He is well-developed. He is not ill-appearing, toxic-appearing or diaphoretic.   HENT:      Head: Normocephalic and atraumatic.      Mouth/Throat:      Pharynx: No oropharyngeal exudate.   Eyes:      General: No scleral icterus.     Conjunctiva/sclera: Conjunctivae normal.   Neck:      Thyroid: No thyromegaly.   Cardiovascular:      Rate and Rhythm: Normal rate and regular rhythm.      Heart sounds: Normal heart sounds. No murmur heard.     No friction rub. No gallop.   Pulmonary:      Effort: Pulmonary effort is normal. No respiratory distress.      Breath sounds: Normal breath sounds. No wheezing or rales.   Abdominal:      General: Bowel sounds are normal. There is no distension.      Palpations: Abdomen is soft. There is no mass.      Tenderness: There is no abdominal tenderness. There is no guarding or rebound.   Musculoskeletal:          General: Normal range of motion.      Cervical back: Normal range of motion and neck supple.      Comments: R arm -- mild intermittently + tinels at the ulnar groove.  No tenderness along the medial R arm.  nl upper extrem strength/sense/DTRs    Bilat feet with fungal toenails, no signs of paronychia   Lymphadenopathy:      Cervical: No cervical adenopathy.   Skin:     General: Skin is warm and dry.      Findings: No lesion or rash.   Neurological:      Mental Status: He is alert and oriented to person, place, and time.   Psychiatric:         Mood and Affect: Mood normal.         Behavior: Behavior normal.         Thought Content: Thought content normal.         Assessment:       1. Essential hypertension    2. Hyponatremia        Plan:       Jeremy ARORA was seen today for follow-up.    Diagnoses and all orders for this visit:    Essential hypertension  -     lisinopriL (PRINIVIL,ZESTRIL) 40 MG tablet; Take 1 tablet (40 mg total) by mouth once daily.  controlled    Hyponatremia  -     lisinopriL (PRINIVIL,ZESTRIL) 40 MG tablet; Take 1 tablet (40 mg total) by mouth once daily.    R arm shocking sensation -- ? Ulnar neuropathy, ? Chemo related -- symptoms are mild, he will call if symptoms worsen.    Nail fungus -- see podiatry    Lymphoma -- I reviewed recent blood work results with the patient and scans    Health Maintenance         Date Due Completion Date    Pneumococcal Vaccines (Age 65+) (1 - PCV) Never done ---    TETANUS VACCINE Never done ---    Shingles Vaccine (1 of 2) Never done ---    COVID-19 Vaccine (5 - Pfizer series) 02/03/2023 10/3/2022    Influenza Vaccine (1) 09/01/2023 3/19/2020    Colorectal Cancer Screening 08/01/2027 8/1/2017    Override on 6/4/2007: Done    Lipid Panel 10/03/2027 10/3/2022     Patient Instructions   I recommend the prevnar 20 pneumonia vaccine and shingles vaccine    Declines today         Follow up in about 1 year (around 9/5/2024).    Future Appointments   Date Time Provider  Department Center   9/19/2023  9:00 AM Lele Sarkar MD [facility]   10/10/2023  9:30 AM [facility]

## 2023-09-06 DIAGNOSIS — C83.38 DIFFUSE LARGE B-CELL LYMPHOMA OF LYMPH NODES OF MULTIPLE REGIONS: Primary | ICD-10-CM

## 2023-09-06 RX ORDER — SILDENAFIL 100 MG/1
100 TABLET, FILM COATED ORAL
Qty: 12 TABLET | Refills: 3 | Status: SHIPPED | OUTPATIENT
Start: 2023-09-06

## 2023-09-07 NOTE — TELEPHONE ENCOUNTER
No care due was identified.  [facility] Embedded Care Due Messages. Reference number: [id number].   9/06/2023 7:29:46 PM CDT

## 2023-09-07 NOTE — TELEPHONE ENCOUNTER
Refill Decision Note   Jeremy Martinez  is requesting a refill authorization.  Brief Assessment and Rationale for Refill:  Approve     Medication Therapy Plan:         Comments:     Note composed:7:48 PM 09/06/2023

## 2023-09-08 ENCOUNTER — TELEPHONE (OUTPATIENT)
Dept: PODIATRY | Facility: CLINIC | Age: 68
End: 2023-09-08
Payer: MEDICARE

## 2023-09-08 NOTE — TELEPHONE ENCOUNTER
----- Message from Lizabeth Sommer sent at 2023 10:08 AM CDT -----  Regarding: No availability  Contact: pt @[phone]  Pt called in to schedule an appt; no available appts in Epic. Pt is asking for a call back soon to schedule. Thanks.         Reason appt not schedule: no availability          Patient's DX:Onychomycosis         Patient requesting call back or [facility] [phone]

## 2023-09-08 NOTE — TELEPHONE ENCOUNTER
----- Message from Marianna Santoro MA sent at 2023 10:31 AM CDT -----  Regarding: FW: No availability  Contact: pt @[phone]  Please help with scheduling thanks  ----- Message -----  From: Lizabeth Sommer  Sent: 2023  10:13 AM CDT  To: [facility]; #  Subject: No availability                                  Pt called in to schedule an appt; no available appts in Epic. Pt is asking for a call back soon to schedule. Thanks.         Reason appt not schedule: no availability          Patient's DX:Onychomycosis         Patient requesting call back or [facility] [phone]

## 2023-09-18 NOTE — PROGRESS NOTES
The patient location is: home  The chief complaint leading to consultation is: History of DLBCL, follow up after EGD    Visit type: audiovisual    Face to Face time with patient: 20 minutes  30 minutes of total time spent on the encounter, which includes face to face time and non-face to face time preparing to see the patient (eg, review of tests), Obtaining and/or reviewing separately obtained history, Documenting clinical information in the electronic or other health record, Independently interpreting results (not separately reported) and communicating results to the patient/family/caregiver, or Care coordination (not separately reported).         Each patient to whom he or she provides medical services by telemedicine is:  (1) informed of the relationship between the physician and patient and the respective role of any other health care provider with respect to management of the patient; and (2) notified that he or she may decline to receive medical services by telemedicine and may withdraw from such care at any time.    Notes:    CC: DLBCL, hematology follow up    HPI: , 68, M, is here for hematology follow up. This is a virtual visit.  He has diffuse large B cell lymphoma. He has GERD, HLD, HTn. He presented to outside hospital in February 2023 with epigastric pain.  He was found to have an esophageal mass and was transferred to [facility] for further care. He underwent endoscopy with biopsy of GI mass . He remained stable on liquid diet.     He has lost ~30 lbs in the past 6 months. Denies fevers, night sweats, upper/lower GI bleeding.     Interval History: He is here for follow up following completion of chemo therapy and repeat EGD. No GI issues now.  Weight remains stable. Denies night sweats. He has tingling and numbness in his hands and feet.  Denies fever, chills, black stools, hematochezia, heron.     Review of patient's allergies indicates:   Allergen Reactions    Amoxicillin Hives and Rash        Current Outpatient Medications   Medication Sig    allopurinoL (ZYLOPRIM) 100 MG tablet Take 3 tablets by mouth once daily    azelastine (ASTELIN) 137 mcg (0.1 %) nasal spray 1 spray (137 mcg total) by Nasal route 2 (two) times daily.    KENALOG 40 mg/mL injection     lisinopriL (PRINIVIL,ZESTRIL) 40 MG tablet Take 1 tablet (40 mg total) by mouth once daily.    magnesium hydroxide 400 mg/5 ml (MILK OF MAGNESIA) 400 mg/5 mL Susp Take 30 mLs (2,400 mg total) by mouth daily as needed.    omeprazole (PRILOSEC) 40 MG capsule Take 1 capsule (40 mg total) by mouth 2 (two) times daily before meals.    potassium chloride SA (K-DUR,KLOR-CON) 20 MEQ tablet Take 1 tablet (20 mEq total) by mouth 3 (three) times daily.    sildenafiL (VIAGRA) 100 MG tablet Take 1 tablet (100 mg total) by mouth as needed for Erectile Dysfunction.     No current facility-administered medications for this visit.          Review of Systems   Constitutional:  Positive for malaise/fatigue (improving). Negative for chills, diaphoresis, fever and weight loss (remains stable now).   HENT:  Negative for congestion, ear pain, hearing loss, nosebleeds, sinus pain and tinnitus.    Eyes:  Negative for blurred vision, double vision, photophobia, pain and discharge.   Respiratory:  Negative for cough, hemoptysis and sputum production.    Cardiovascular:  Negative for palpitations, orthopnea, claudication and leg swelling.   Gastrointestinal:  Negative for abdominal pain (resolved), blood in stool, constipation (resolved), diarrhea, heartburn (improved), melena, nausea (occasionally) and vomiting.   Genitourinary:  Negative for dysuria, frequency, hematuria and urgency.   Musculoskeletal:  Negative for back pain, myalgias and neck pain.   Neurological:  Positive for tingling. Negative for tremors, focal weakness, weakness and headaches.   Endo/Heme/Allergies:  Negative for environmental allergies.   Psychiatric/Behavioral:  Negative for depression,  hallucinations and substance abuse. The patient is not nervous/anxious and does not have insomnia.           PS: ECOG 1    Physical excm deferred due to nature of visit    2/18/23 CT abdomen pelvis with contrast    COMPARISON:  None     FINDINGS:  Abdomen:     - Lung bases: There is focal emphysematous change in the medial left lung base.  Lung bases contain mild areas of peripheral chronic atelectasis.  A focus of suspected atelectasis simulates a nodular opacity at the right dome of the diaphragm.     There is distal esophageal distension with fluid secondary to and infiltrative type mass involving the visualized cardiac portion of the stomach extending to the GE junction concerning for malignancy.  There is a mantle of lobular adenopathy surrounding the proximal abdominal aorta appearing to involve the retrocrural lymph nodes and adjacent periaortic and pericaval lymph nodes to the level of the left renal vein with the renal a vein somewhat displaced.  No visible renal vein invasion/thrombosis.  Smaller shotty type lymph nodes are seen caudal to the renal veins in the retroperitoneum.     - Liver: The liver appears homogeneous and not significantly enlarged.     - Gallbladder: There is no CT evidence of cholecystitis or cholelithiasis.     - Bile Ducts: No evidence of intra or extra hepatic biliary ductal dilation.     - Spleen: Negative.     - Kidneys: The kidneys enhance symmetrically and homogeneously.     - Adrenals: Unremarkable.     - Pancreas: Pancreas is displaced anteriorly by the retroperitoneal adenopathy with an ill-defined mass around 3 cm in diameter in the body appearing contiguous with the pancreatic parenchyma and similar in density to the retroperitoneal adenopathy consistent with involvement/invasion.  Additionally there is no fat plane margin between the posterior pancreatic body and the lobular retroperitoneal mantle of adenopathy.  Heterogeneous enhancement of the body of the pancreas in  this region and poor visualization of the splenic artery and vein within the body suggests vessel encasement/involvement.  Partial thrombosis of portions of these vessels is suspected.     -Vascular: No abdominal aortic aneurysm or significant atherosclerosis.     - Abdominal wall:  There is a small right inguinal hernia.     Small bowel and colon: There is no small bowel distension.  There is no mesenteric convincing adenopathy.  Shotty lymph nodes are noted.  No extraluminal free air or free fluid is seen in the abdomen or pelvis.  Appendix is not isolated as a separate structure however there is no evidence of appendicitis.  Few scattered diverticuli are seen distally in the colon.  No diverticulitis.     Pelvis:     There is no pelvic mass, adenopathy, or free fluid.  Bladder is unremarkable noting mild thickening of the mucosa can be explained by under distension.  The prostate is borderline mildly prominent.     Bones:  No acute osseous abnormality and no suspicious lytic or blastic lesion.     Impression:     Bulky infiltrative type mass compatible with malignancy involving the gastric cardiac portion extending to the GE junction with esophageal high-grade obstruction .  Further evaluation can be made with CT chest.     Bulky adenopathy in the retroperitoneum in the periaortic and pericaval region to the level of the left renal vein also appearing to involve the dorsal pancreatic body and also appearing to involve and obstruct the splenic artery and vein as described.     Incidental additional nonacute findings are discussed in the body of the report.       2/20/23 upper GI EUS     --One extrinsic moderate stenosis was found at the gastroesophageal junction. The stenosis was traversed.      --  A large, fungating and infiltrative mass with oozing bleeding was found in the cardia, in the gastric fundus and in the gastric body.        --Biopsies were taken with a cold forceps for histology.        --The  examined duodenum was normal.          ENDOSONOGRAPHIC FINDING:          -- The esophagus and adjacent structures were visualized endosonographically.      --  A hypoechoic mass was identified endosonographically in the cardia of the stomach and in the fundus of the stomach (seen from the GE  junction, as EUS scope could not traverse the area of extrinsic        stenosis). The mass measured 52 mm by 62 mm in maximal cross-sectional diameter.     Impression:            - Extrinsic narrowing of the esophagus.                          - Gastric tumor in the cardia, in the gastric fundus and in the gastric body. Biopsied.                          - Normal examined duodenum.         1. GASTRIC MASS BIOPSY:            -  Diffuse large B-cell lymphoma, non-germinal center subtype          -  High proliferation index (99% by Ki-67 immunostain)          -  Negative for CD30 co-expression by immunostain          -  TREY JANA positive for EBV-encoded small mRNAs in  few scattered   bystander lymphocytes          -   PENDING  (send-out): MYC tech only immunostain and Double/Triple Hit Lymphoma FISH panel with results to be reported in a separate supplemental and final diagnosis may be further classified             at that time   COMMENT: No flow cytometric analysis was performed on this specimen    Low and high power examination reveal minute irregular fragments of gastric tissue with an atypical diffuse submucosal infiltrate with focal crush artifacts and composed predominantly of large lymphoma cells with occasional   prominent central nucleoli and focal areas with increased scattered mature eosinophils and rarer scattered neutrophils, histiocytes, small mature lymphocytes, and plasma cells.   AE1/AE3, Synaptophysin, and Chromogranin immunostains were performed with adequate controls on block 1A by the referring surgical pathologist who   initially reviewed this biopsy and are negative for carcinoma or infiltrating  neuroendocrine cells.   Immunohistochemical study with stains for CD3, CD5, BCL-2, CD20, CD23, Cyclin D1, CD10, BCL-6, MUM-1, CD30, and Ki-67 and EBV-encoded small mRNAs in-situ hybridization (TREY JANA) were performed on block 1A with appropriate controls   for increased sensitivity and cellular localization within the cells of interest.  Unstained slides of block 1A were sent out to [facility] for MYC tech only immunostain with results to be interpreted at [facility] and Double/Triple Hit Lymphoma FISH panel.   The large lymphoma cells are positive for CD20 and MUM-1 (nuclear) and negative for AE1/AE3, Synaptophysin, Chromogranin, CD3, CD5, CD23, Cyclin D1   (nuclear), CD10 (dim <30%), BCL-6 (strong nuclear <30%), and CD30. Ki-67 (nuclear) highlights a high proliferation index (99%). TREY JANA stains few scattered small and medium-sized bystander lymphocytes.   AE1/A3 stains mucosal and glandular epithelial cells. Synaptophysin and/or Chromogranin stain a rare subset of normal neuroendocrine cells associated   with the glandular epithelial cells.   CD3, CD5, and BCL-2 co-stain numerous scattered reactive T-cells within the focal non-involved areas of the biopsy. CD5 also appears to stain glandular   epithelial cells and a rare subset of the mucosal epithelial cells. Cyclin D1 (nuclear) stains epithelial and endothelial cells. CD10 stains stromal cells and few scattered neutrophils. Rare scattered medium-sized CD30+ cells (favor   reactive immunoblasts) are identified      3/8/23 hemoglobin electrophoresis: No electrophoretic evidence of abnormal hemoglobin or beta thalassemia. See comment    3/23/23 PET CT      COMPARISON:  CT abdomen pelvis 02/18/2023     FINDINGS:  Quality of the study: Adequate.     Reference values:     Mediastinal blood pool maximum SUV: 1.7     Normal liver background maximum SUV: 1.4     There is lymphadenopathy above below the diaphragm.     In the  head and neck, there are no hypermetabolic lesions worrisome for malignancy. There are no hypermetabolic mucosal lesions, and there are no pathologically enlarged or hypermetabolic lymph nodes.     In the chest, there is mediastinal lymphadenopathy e.g. subcarinal node measuring 1.0 cm with SUV max 28 (image 70) and left upper paratracheal node measuring 0.6 cm with SUV max 12 (image 41).     Small left pleural effusion.  No concerning pulmonary nodules or masses.     In the abdomen and pelvis, there is marked diffuse stomach wall thickening with intense uptake, SUV max 42.  Large wanda conglomerate in the mid abdomen with SUV max 36 (axial fused image 125), difficult to delineate from surrounding bowel and vessels CT.  Additional scattered hypermetabolic lymphadenopathy involving retrocrural nodes, mesenteric nodes, and omental nodules.     There is physiologic tracer distribution within the abdominal organs and excretion into the genitourinary system including probable pooling of excreted tracer within the penile urethra.     The spleen has normal uptake and is nonenlarged at 5 cm in craniocaudal dimension.     In the bones, there are no hypermetabolic lesions worrisome for malignancy.     In the extremities, there are no hypermetabolic lesions worrisome for malignancy.     Incidental CT findings: Right chest port with catheter tip in the right atrium.  Multi-vessel coronary calcifications.     Impression:     1.  In this patient with lymphoma, there is hypermetabolic lymphadenopathy above and below the diaphragm, well as an infiltrative stomach mass consistent with extranodal disease.  Significant lymphadenopathy below the diaphragm is difficult to delineate without intravenous contrast, and bulky disease is not excluded.       3/28/23 ECHO    Normal central venous pressure (3 mmHg).  The estimated PA systolic pressure is 40 mmHg.  The left ventricle is normal in size with normal systolic function.  The estimated  ejection fraction is 60%.  Grade I left ventricular diastolic dysfunction.  Normal right ventricular size with normal right ventricular systolic function.  Mild to moderate tricuspid regurgitation.  Small pericardial effusion.      3/31/23 CSF cytology: Negative for malignant cells      5/30/23 PET CT      Overall significant improvement as compared to previous PET-CT noting near complete resolution of previously seen gastric wall thickening and chest/abdominal lymphadenopathy, noting residual tracer uptake adjacent to the gastric antrum without definite CT correlate and mild residual uptake in the mediastinum/shellie.     Improvement of ascites, trace residual free fluid in the pelvis with heterogeneous radiotracer uptake, possibly due to underlying physiologic bowel uptake.  Recommend attention on follow-up.     Probable mild marrow hyperplasia.      8/3/23 PET CT    COMPARISON:  NM PET-CT 05/30/2023, 03/23/2023     FINDINGS:  Quality of the study: Adequate.     Reference values:     Mediastinal blood pool maximum SUV: 1.8     Normal liver background maximum SUV: 2.4     In the head and neck, there are no hypermetabolic lesions worrisome for malignancy. There are no hypermetabolic mucosal lesions, and there are no pathologically enlarged or hypermetabolic lymph nodes.     In the chest, there are no hypermetabolic lesions worrisome for malignancy.  Stable bilateral pulmonary nodules measuring up to 0.4 cm, too small for PET detection.     In the abdomen and pelvis, there is a small focus of persistent mild radiotracer avidity within the distal gastric wall (image 140) demonstrating maximum SUV of 3.1 (previously 5.4).  No pathologically enlarged or hypermetabolic lymph nodes.  There is physiologic tracer distribution within the abdominal organs and excretion into the genitourinary system.     Spleen is normal size and demonstrates normal uptake.     In the bones, there are no hypermetabolic lesions worrisome for  malignancy.     In the extremities, there are no hypermetabolic lesions worrisome for malignancy.     Incidental CT findings: Right chest wall port with catheter tip terminating within the right atrium.     Impression:     Continued favorable response to therapy with resolution of prior hypermetabolic lymphadenopathy.     Small focus of persistent mild radiotracer avidity within the distal gastric wall however decreased compared to prior exam.  No new hypermetabolic lesion.         8/23/23 EGD      - Normal esophagus.                          - Scar in the gastric fundus.                          - Normal.                          - No specimens collected  Component      Latest Ref Rng 9/14/2023   WBC      3.90 - 12.70 K/uL 5.81    RBC      4.60 - 6.20 M/uL 3.66 (L)    Hemoglobin      14.0 - 18.0 g/dL 9.8 (L)    Hematocrit      40.0 - 54.0 % 30.2 (L)    MCV      82 - 98 fL 83    MCH      27.0 - 31.0 pg 26.8 (L)    MCHC      32.0 - 36.0 g/dL 32.5    RDW      11.5 - 14.5 % 15.4 (H)    Platelets      150 - 450 K/uL 199    MPV      9.2 - 12.9 fL 10.7    Immature Granulocytes      0.0 - 0.5 % 0.3    Gran # (ANC)      1.8 - 7.7 K/uL 2.8    Immature Grans (Abs)      0.00 - 0.04 K/uL 0.02    Lymph #      1.0 - 4.8 K/uL 2.0    Mono #      0.3 - 1.0 K/uL 0.8    Eos #      0.0 - 0.5 K/uL 0.1    Baso #      0.00 - 0.20 K/uL 0.03    nRBC      0 /100 WBC 0    Gran %      38.0 - 73.0 % 48.8    Lymph %      18.0 - 48.0 % 34.9    Mono %      4.0 - 15.0 % 13.3    Eosinophil %      0.0 - 8.0 % 2.2    Basophil %      0.0 - 1.9 % 0.5    Differential Method Automated    Sodium      136 - 145 mmol/L 137    Potassium      3.5 - 5.1 mmol/L 4.6    Chloride      95 - 110 mmol/L 103    CO2      23 - 29 mmol/L 24    Glucose      70 - 110 mg/dL 91    BUN      8 - 23 mg/dL 15    Creatinine      0.5 - 1.4 mg/dL 0.8    Calcium      8.7 - 10.5 mg/dL 9.2    PROTEIN TOTAL      6.0 - 8.4 g/dL 6.8    Albumin      3.5 - 5.2 g/dL 3.6    BILIRUBIN TOTAL       0.1 - 1.0 mg/dL 0.5    Alkaline Phosphatase      55 - 135 U/L 111    AST      10 - 40 U/L 26    ALT      10 - 44 U/L 17    eGFR      >60 mL/min/1.73 m^2 >60.0    Anion Gap      8 - 16 mmol/L 10    LD      110 - 260 U/L 202       Legend:  (L) Low  (H) High      Assessment:    1. History of DLBCL, non-GC type of multiple sites  2. History of DLBCL of stomach  3. Fatigue  4. Microcytic anemia  5. Htn, primary  6. HLD  7.GERD  8. Cancer associated pain  9. 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 Malnutrition      Plan:    1,2: I discussed the diagnosis, prognosis of diffuse large B cell lymphoma. I explained that the treatment is with multi-agent chemotherapy. Staging with PETCT, BM biopsy.  FISH pending. Ki 67 high. He will have CBC, CMP, LDH, uric acid, G6PD, HBV, HIV serology checked. He will start allopurinol 300mg daily to prevent tumor lysis syndrome.  PET CT  on 3/23/23 demonstrated hypermetabolic lymphadenopathy above and below the diaphragm, well as an infiltrative stomach mass consistent with extranodal disease. ECHO showed normal LVEF on 3/28/23.   NCCN IPI score 6, suggesting high risk disease, with estimated 5 year PFS of 30% and estimated 5 year OS of 33%.   Cycle 1 chemotherapy was in-patient, on 3/29/23. Rituximab was omitted from cycle 1 due to risk of GI perforation. IT MTX administered with cycle 1 CHOP as CNS IPI was intermediate/ high. No malignant cells identified in CSF on 3/31/23.   He completed 6 Cycles of CHOP(07/13/23). Mid treatment PET CT with positive response to treatment(05/30/23).  EOT PET(07/26/23) showed a small focus of persistent mild radiotracer avidity within the distal gastric wall , but decreased compared to prior exam.  In the abdomen and pelvis, there was a small focus of persistent mild radiotracer avidity within the distal gastric wall demonstrating maximum SUV of 3.1 (previously 5.4).No new hypermetabolic lesion.   8/23/23 EGD demonstrated no mass,  but a scar was noted in the gastric fundus. This was NOT biopsied. Will consider repeat imaging and/or EGD in 6 months.     3. Improving. He is more active now.    4. TIBC was low, ferritin  was normal,  saturated iron 12% on 2/19/23.  hemoglobin electrophoresis showed normal pattern on 3/8/23. Anemia improving slowly.     5,6: He follows with     7. He is on omeprazole BID    8. Pain resolved now, not taking pain meds at this time      10. He has grade 1-2 neuropathy, in upper and lower extremities. He will take gabapentin if neuropathy worsens.     11. He previously had weight loss secondary to poor oral intake. He is on liquid diet. Encouraged use of ENSURE three times daily.  Currently eating well.         Advance Care Planning     Date: 09/18/2023   We previously had discussions regarding his underlying diagnosis, natural history, prognosis, and treatment options.  He was interested in pursuing any and all treatment options available to him, including current chemo therapy. Completed treatment and tolerated well.  Will continue monitor for late chemo effect.   Total time of this visit was 30 minutes, including time spent face to face with patient and also in preparing for today's visit for MDM and documentation. (Medical Decision Making, including consideration of possible diagnoses, management options, complex medical record review, review of diagnostic tests and information, consideration and discussion of significant complications based on comorbidities, and discussion with providers involved with the care of the patient). Greater than 50% was spent face to face with the patient counseling and coordinating care.              BMT Chart Routing      Follow up with physician 3 months.   Follow up with BRIAN    Provider visit type    Infusion scheduling note   needs monthly port flush   Injection scheduling note    Labs CBC, CMP, LDH, ferritin, iron and TIBC and reticulocytes   Scheduling:  Preferred  lab:  Lab interval:     Imaging    Pharmacy appointment    Other referrals

## 2023-09-19 ENCOUNTER — OFFICE VISIT (OUTPATIENT)
Dept: HEMATOLOGY/ONCOLOGY | Facility: CLINIC | Age: 68
End: 2023-09-19
Payer: MEDICARE

## 2023-09-19 DIAGNOSIS — G62.0 DRUG-INDUCED POLYNEUROPATHY: ICD-10-CM

## 2023-09-19 DIAGNOSIS — C83.38 DIFFUSE LARGE B-CELL LYMPHOMA OF LYMPH NODES OF MULTIPLE REGIONS: Primary | ICD-10-CM

## 2023-09-19 DIAGNOSIS — E78.5 HYPERLIPIDEMIA, UNSPECIFIED HYPERLIPIDEMIA TYPE: Chronic | ICD-10-CM

## 2023-09-19 DIAGNOSIS — K21.9 GASTROESOPHAGEAL REFLUX DISEASE, UNSPECIFIED WHETHER ESOPHAGITIS PRESENT: Chronic | ICD-10-CM

## 2023-09-19 DIAGNOSIS — D64.9 ANEMIA OF UNKNOWN ETIOLOGY: ICD-10-CM

## 2023-09-19 DIAGNOSIS — R53.82 CHRONIC FATIGUE: ICD-10-CM

## 2023-09-19 DIAGNOSIS — I10 PRIMARY HYPERTENSION: Chronic | ICD-10-CM

## 2023-09-19 PROBLEM — R11.0 NAUSEA: Status: RESOLVED | Noted: 2023-03-24 | Resolved: 2023-09-19

## 2023-09-19 PROBLEM — K59.09 OTHER CONSTIPATION: Status: RESOLVED | Noted: 2023-03-29 | Resolved: 2023-09-19

## 2023-09-19 PROCEDURE — 4010F PR ACE/ARB THEARPY RXD/TAKEN: ICD-10-PCS | Mod: HCNC,CPTII,95, | Performed by: INTERNAL MEDICINE

## 2023-09-19 PROCEDURE — 4010F ACE/ARB THERAPY RXD/TAKEN: CPT | Mod: HCNC,CPTII,95, | Performed by: INTERNAL MEDICINE

## 2023-09-19 PROCEDURE — 99214 PR OFFICE/OUTPT VISIT, EST, LEVL IV, 30-39 MIN: ICD-10-PCS | Mod: HCNC,95,, | Performed by: INTERNAL MEDICINE

## 2023-09-19 PROCEDURE — 99214 OFFICE O/P EST MOD 30 MIN: CPT | Mod: HCNC,95,, | Performed by: INTERNAL MEDICINE

## 2023-09-25 ENCOUNTER — PATIENT MESSAGE (OUTPATIENT)
Dept: HEMATOLOGY/ONCOLOGY | Facility: CLINIC | Age: 68
End: 2023-09-25
Payer: MEDICARE

## 2023-10-06 ENCOUNTER — TELEPHONE (OUTPATIENT)
Dept: ADMINISTRATIVE | Facility: CLINIC | Age: 68
End: 2023-10-06
Payer: MEDICARE

## 2023-10-09 ENCOUNTER — PATIENT MESSAGE (OUTPATIENT)
Dept: ADMINISTRATIVE | Facility: CLINIC | Age: 68
End: 2023-10-09
Payer: MEDICARE

## 2023-10-09 ENCOUNTER — TELEPHONE (OUTPATIENT)
Dept: ADMINISTRATIVE | Facility: CLINIC | Age: 68
End: 2023-10-09
Payer: MEDICARE

## 2023-10-09 NOTE — TELEPHONE ENCOUNTER
Called pt; no answer; left message informing patient I was calling to confirm his virtual EAWV on 10/10/23 at 9:30am and to see if he needed any help with setting up for virtual appt or e-pre check and to login 10 minutes prior to scheduled appt; left my name & number for pt to return my call if he has any questions or concerns; sent message through portal

## 2023-10-10 ENCOUNTER — TELEPHONE (OUTPATIENT)
Dept: ADMINISTRATIVE | Facility: CLINIC | Age: 68
End: 2023-10-10
Payer: MEDICARE

## 2023-10-10 ENCOUNTER — OFFICE VISIT (OUTPATIENT)
Dept: FAMILY MEDICINE | Facility: CLINIC | Age: 68
End: 2023-10-10
Payer: MEDICARE

## 2023-10-10 VITALS — WEIGHT: 123 LBS | BODY MASS INDEX: 19.77 KG/M2 | HEIGHT: 66 IN

## 2023-10-10 DIAGNOSIS — I10 PRIMARY HYPERTENSION: Chronic | ICD-10-CM

## 2023-10-10 DIAGNOSIS — Z00.00 ENCOUNTER FOR MEDICARE ANNUAL WELLNESS EXAM: ICD-10-CM

## 2023-10-10 DIAGNOSIS — K21.9 GASTROESOPHAGEAL REFLUX DISEASE, UNSPECIFIED WHETHER ESOPHAGITIS PRESENT: Chronic | ICD-10-CM

## 2023-10-10 DIAGNOSIS — D64.9 ANEMIA OF UNKNOWN ETIOLOGY: ICD-10-CM

## 2023-10-10 DIAGNOSIS — Z00.00 ENCOUNTER FOR PREVENTIVE HEALTH EXAMINATION: Primary | ICD-10-CM

## 2023-10-10 DIAGNOSIS — E78.5 HYPERLIPIDEMIA, UNSPECIFIED HYPERLIPIDEMIA TYPE: Chronic | ICD-10-CM

## 2023-10-10 DIAGNOSIS — N52.9 ERECTILE DYSFUNCTION, UNSPECIFIED ERECTILE DYSFUNCTION TYPE: ICD-10-CM

## 2023-10-10 DIAGNOSIS — G62.0 DRUG-INDUCED POLYNEUROPATHY: ICD-10-CM

## 2023-10-10 DIAGNOSIS — C83.38 DIFFUSE LARGE B-CELL LYMPHOMA OF LYMPH NODES OF MULTIPLE REGIONS: ICD-10-CM

## 2023-10-10 PROBLEM — Z12.11 SCREENING FOR COLON CANCER: Status: RESOLVED | Noted: 2017-08-01 | Resolved: 2023-10-10

## 2023-10-10 PROCEDURE — 1170F PR FUNCTIONAL STATUS ASSESSED: ICD-10-PCS | Mod: HCNC,CPTII,95,

## 2023-10-10 PROCEDURE — 1160F RVW MEDS BY RX/DR IN RCRD: CPT | Mod: HCNC,CPTII,95,

## 2023-10-10 PROCEDURE — 1126F PR PAIN SEVERITY QUANTIFIED, NO PAIN PRESENT: ICD-10-PCS | Mod: HCNC,CPTII,95,

## 2023-10-10 PROCEDURE — G0439 PR MEDICARE ANNUAL WELLNESS SUBSEQUENT VISIT: ICD-10-PCS | Mod: HCNC,95,,

## 2023-10-10 PROCEDURE — 1159F PR MEDICATION LIST DOCUMENTED IN MEDICAL RECORD: ICD-10-PCS | Mod: HCNC,CPTII,95,

## 2023-10-10 PROCEDURE — 1101F PT FALLS ASSESS-DOCD LE1/YR: CPT | Mod: HCNC,CPTII,95,

## 2023-10-10 PROCEDURE — 1170F FXNL STATUS ASSESSED: CPT | Mod: HCNC,CPTII,95,

## 2023-10-10 PROCEDURE — 1101F PR PT FALLS ASSESS DOC 0-1 FALLS W/OUT INJ PAST YR: ICD-10-PCS | Mod: HCNC,CPTII,95,

## 2023-10-10 PROCEDURE — 1160F PR REVIEW ALL MEDS BY PRESCRIBER/CLIN PHARMACIST DOCUMENTED: ICD-10-PCS | Mod: HCNC,CPTII,95,

## 2023-10-10 PROCEDURE — 1126F AMNT PAIN NOTED NONE PRSNT: CPT | Mod: HCNC,CPTII,95,

## 2023-10-10 PROCEDURE — 4010F ACE/ARB THERAPY RXD/TAKEN: CPT | Mod: HCNC,CPTII,95,

## 2023-10-10 PROCEDURE — 1159F MED LIST DOCD IN RCRD: CPT | Mod: HCNC,CPTII,95,

## 2023-10-10 PROCEDURE — G0439 PPPS, SUBSEQ VISIT: HCPCS | Mod: HCNC,95,,

## 2023-10-10 PROCEDURE — 4010F PR ACE/ARB THEARPY RXD/TAKEN: ICD-10-PCS | Mod: HCNC,CPTII,95,

## 2023-10-10 PROCEDURE — 3288F PR FALLS RISK ASSESSMENT DOCUMENTED: ICD-10-PCS | Mod: HCNC,CPTII,95,

## 2023-10-10 PROCEDURE — 3288F FALL RISK ASSESSMENT DOCD: CPT | Mod: HCNC,CPTII,95,

## 2023-10-10 NOTE — PATIENT INSTRUCTIONS
Counseling and Referral of Other Preventative  (Italic type indicates deductible and co-insurance are waived)    Patient Name: Jeremy Martinez  Today's Date: 10/10/2023    Health Maintenance       Date Due Completion Date    COVID-19 Vaccine (5 - 2023-24 season) 12/31/2023 (Originally 9/1/2023) 10/3/2022    Pneumococcal Vaccines (Age 65+) (1 - PCV) 01/01/2024 (Originally 3/1/1961) ---    TETANUS VACCINE 10/10/2024 (Originally 3/1/1972) ---    Shingles Vaccine (1 of 2) 10/10/2024 (Originally 3/1/1973) ---    Colorectal Cancer Screening 08/01/2027 8/1/2017    Override on 6/4/2007: Done    Lipid Panel 10/03/2027 10/3/2022        No orders of the defined types were placed in this encounter.      The following information is provided to all patients.  This information is to help you find resources for any of the problems found today that may be affecting your health:                Living healthy guide: www.[facility].[place].gov      Understanding Diabetes: www.diabetes.org      Eating healthy: www.cdc.gov/healthyweight      CDC home safety checklist: www.cdc.gov/steadi/patient.html      Agency on Aging: www.goea.[place].gov      Alcoholics anonymous (AA): www.aa.org      Physical Activity: www.modesta.nih.gov/tp6kdwc      Tobacco use: www.quitwithusla.org

## 2023-10-10 NOTE — TELEPHONE ENCOUNTER
Called pt; informed pt I was just making a reminder call for pt's virtual visit today at 9:30am and to see if pt needed any help; pt stated didn't need any help; pt informed to login 10 minutes prior to appt time

## 2023-10-10 NOTE — PROGRESS NOTES
"  The patient location is: [place]  The chief complaint leading to consultation is: Medicare AWV    Visit type: audiovisual    Face to Face time with patient: 24  60 minutes of total time spent on the encounter, which includes face to face time and non-face to face time preparing to see the patient (eg, review of tests), Obtaining and/or reviewing separately obtained history, Documenting clinical information in the electronic or other health record, Independently interpreting results (not separately reported) and communicating results to the patient/family/caregiver, or Care coordination (not separately reported).         Each patient to whom he or she provides medical services by telemedicine is:  (1) informed of the relationship between the physician and patient and the respective role of any other health care provider with respect to management of the patient; and (2) notified that he or she may decline to receive medical services by telemedicine and may withdraw from such care at any time.    Notes:       Jeremy Martinez presented for a  Medicare AWV and comprehensive Health Risk Assessment today. The following components were reviewed and updated:    Medical history  Family History  Social history  Allergies and Current Medications  Health Risk Assessment  Health Maintenance  Care Team         ** See Completed Assessments for Annual Wellness Visit within the encounter summary.**         The following assessments were completed:  Living Situation  CAGE  Depression Screening  Fall Risk Assessment (MACH 10)  Hearing Assessment(HHI)  Cognitive Function Screening  Nutrition Screening  ADL Screening  PAQ Screening      Vitals:    10/10/23 0938   Weight: 55.8 kg (123 lb)   Height: 5' 6" (1.676 m)     Body mass index is 19.85 kg/m².  Physical Exam  Constitutional:       General: He is not in acute distress.     Appearance: Normal appearance. He is well-developed and well-groomed.   Eyes:      Comments: Wearing glasses "    Skin:     Coloration: Skin is not pale.   Neurological:      Mental Status: He is alert and oriented to person, place, and time.   Psychiatric:         Mood and Affect: Mood normal.         Speech: Speech normal.         Behavior: Behavior normal. Behavior is cooperative.         Thought Content: Thought content normal.               Diagnoses and health risks identified today and associated recommendations/orders:    1. Encounter for Medicare annual wellness exam  - Ambulatory Referral/Consult to Enhanced Annual Wellness Visit (eAWV)    2. Encounter for preventive health examination  Screenings performed, as noted above. Personal preventative testing needs reviewed.     3. Diffuse large B-cell lymphoma of lymph nodes of multiple regions  Stable. S/p chemo, last in July 2023. Followed by Oncology.     4. Drug-induced polyneuropathy  Chronic. Stable. Followed by Oncology.     5. Primary hypertension  Chronic; stable on medication. Followed by PCP.    6. Hyperlipidemia, unspecified hyperlipidemia type  Chronic; stable. Followed by PCP.    7. Erectile dysfunction, unspecified erectile dysfunction type  Chronic; stable. Followed by PCP.    8. Anemia of unknown etiology  Chronic; stable. Followed by Oncology.     9. Gastroesophageal reflux disease, unspecified whether esophagitis present  Chronic; stable. Followed by PCP.      Review for Opioid Screening: Patient does not have rx for Opioids.    Review for Substance Use Disorders: Patient does not use substance.    Provided Jeremy ARORA with a 5-10 year written screening schedule and personal prevention plan. Recommendations were developed using the USPSTF age appropriate recommendations. Education, counseling, and referrals were provided as needed. After Visit Summary printed and given to patient which includes a list of additional screenings\tests needed.    Follow up in about 1 year (around 10/10/2024) for your next annual wellness visit.    Loulou Cloud,  NP      Advance Care Planning     I offered to discuss advanced care planning, including how to pick a person who would make decisions for you if you were unable to make them for yourself, called a health care power of , and what kind of decisions you might make such as use of life sustaining treatments such as ventilators and tube feeding when faced with a life limiting illness recorded on a living will that they will need to know. (How you want to be cared for as you near the end of your natural life)     X  Patient is unwilling to engage in a discussion regarding advance directives at this time.   no

## 2023-10-12 DIAGNOSIS — E87.1 HYPONATREMIA: ICD-10-CM

## 2023-10-12 DIAGNOSIS — I10 ESSENTIAL HYPERTENSION: ICD-10-CM

## 2023-10-12 RX ORDER — LISINOPRIL 40 MG/1
40 TABLET ORAL DAILY
Qty: 90 TABLET | Refills: 3 | Status: SHIPPED | OUTPATIENT
Start: 2023-10-12

## 2023-10-12 NOTE — TELEPHONE ENCOUNTER
No care due was identified.  [facility] Embedded Care Due Messages. Reference number: [id number].   10/12/2023 7:45:30 AM CDT

## 2023-10-12 NOTE — TELEPHONE ENCOUNTER
Refill Decision Note   Jeremy Martinez  is requesting a refill authorization.  Brief Assessment and Rationale for Refill:  Approve     Medication Therapy Plan:       Medication Reconciliation Completed: No   Comments:     No Care Gaps recommended.     Note composed:11:30 AM 10/12/2023

## 2023-10-19 ENCOUNTER — INFUSION (OUTPATIENT)
Dept: INFUSION THERAPY | Facility: HOSPITAL | Age: 68
End: 2023-10-19
Payer: MEDICARE

## 2023-10-19 DIAGNOSIS — C83.38 DIFFUSE LARGE B-CELL LYMPHOMA OF LYMPH NODES OF MULTIPLE REGIONS: Primary | ICD-10-CM

## 2023-10-19 PROCEDURE — 96523 IRRIG DRUG DELIVERY DEVICE: CPT | Mod: HCNC

## 2023-10-19 PROCEDURE — A4216 STERILE WATER/SALINE, 10 ML: HCPCS | Mod: HCNC | Performed by: INTERNAL MEDICINE

## 2023-10-19 PROCEDURE — 25000003 PHARM REV CODE 250: Mod: HCNC | Performed by: INTERNAL MEDICINE

## 2023-10-19 PROCEDURE — 63600175 PHARM REV CODE 636 W HCPCS: Mod: HCNC | Performed by: INTERNAL MEDICINE

## 2023-10-19 RX ORDER — SODIUM CHLORIDE 0.9 % (FLUSH) 0.9 %
10 SYRINGE (ML) INJECTION
Status: DISCONTINUED | OUTPATIENT
Start: 2023-10-19 | End: 2023-10-19 | Stop reason: HOSPADM

## 2023-10-19 RX ORDER — HEPARIN 100 UNIT/ML
500 SYRINGE INTRAVENOUS
Status: CANCELLED | OUTPATIENT
Start: 2023-10-19

## 2023-10-19 RX ORDER — SODIUM CHLORIDE 0.9 % (FLUSH) 0.9 %
10 SYRINGE (ML) INJECTION
Status: CANCELLED | OUTPATIENT
Start: 2023-10-19

## 2023-10-19 RX ORDER — HEPARIN 100 UNIT/ML
500 SYRINGE INTRAVENOUS
Status: DISCONTINUED | OUTPATIENT
Start: 2023-10-19 | End: 2023-10-19 | Stop reason: HOSPADM

## 2023-10-19 RX ADMIN — HEPARIN 500 UNITS: 100 SYRINGE at 08:10

## 2023-10-19 RX ADMIN — Medication 10 ML: at 08:10

## 2023-11-13 ENCOUNTER — OFFICE VISIT (OUTPATIENT)
Dept: PODIATRY | Facility: CLINIC | Age: 68
End: 2023-11-13
Payer: MEDICARE

## 2023-11-13 VITALS
WEIGHT: 133.19 LBS | HEIGHT: 66 IN | BODY MASS INDEX: 21.4 KG/M2 | HEART RATE: 61 BPM | DIASTOLIC BLOOD PRESSURE: 87 MMHG | SYSTOLIC BLOOD PRESSURE: 147 MMHG

## 2023-11-13 DIAGNOSIS — B35.1 ONYCHOMYCOSIS: ICD-10-CM

## 2023-11-13 DIAGNOSIS — L60.9 DISEASE OF NAIL: ICD-10-CM

## 2023-11-13 DIAGNOSIS — G62.0 DRUG-INDUCED POLYNEUROPATHY: Primary | ICD-10-CM

## 2023-11-13 PROCEDURE — 1160F PR REVIEW ALL MEDS BY PRESCRIBER/CLIN PHARMACIST DOCUMENTED: ICD-10-PCS | Mod: HCNC,CPTII,S$GLB, | Performed by: PODIATRIST

## 2023-11-13 PROCEDURE — 1159F PR MEDICATION LIST DOCUMENTED IN MEDICAL RECORD: ICD-10-PCS | Mod: HCNC,CPTII,S$GLB, | Performed by: PODIATRIST

## 2023-11-13 PROCEDURE — 1126F PR PAIN SEVERITY QUANTIFIED, NO PAIN PRESENT: ICD-10-PCS | Mod: HCNC,CPTII,S$GLB, | Performed by: PODIATRIST

## 2023-11-13 PROCEDURE — 4010F PR ACE/ARB THEARPY RXD/TAKEN: ICD-10-PCS | Mod: HCNC,CPTII,S$GLB, | Performed by: PODIATRIST

## 2023-11-13 PROCEDURE — 4010F ACE/ARB THERAPY RXD/TAKEN: CPT | Mod: HCNC,CPTII,S$GLB, | Performed by: PODIATRIST

## 2023-11-13 PROCEDURE — 1126F AMNT PAIN NOTED NONE PRSNT: CPT | Mod: HCNC,CPTII,S$GLB, | Performed by: PODIATRIST

## 2023-11-13 PROCEDURE — 3008F BODY MASS INDEX DOCD: CPT | Mod: HCNC,CPTII,S$GLB, | Performed by: PODIATRIST

## 2023-11-13 PROCEDURE — 1160F RVW MEDS BY RX/DR IN RCRD: CPT | Mod: HCNC,CPTII,S$GLB, | Performed by: PODIATRIST

## 2023-11-13 PROCEDURE — 99999 PR PBB SHADOW E&M-EST. PATIENT-LVL IV: ICD-10-PCS | Mod: PBBFAC,HCNC,, | Performed by: PODIATRIST

## 2023-11-13 PROCEDURE — 3008F PR BODY MASS INDEX (BMI) DOCUMENTED: ICD-10-PCS | Mod: HCNC,CPTII,S$GLB, | Performed by: PODIATRIST

## 2023-11-13 PROCEDURE — 3077F SYST BP >= 140 MM HG: CPT | Mod: HCNC,CPTII,S$GLB, | Performed by: PODIATRIST

## 2023-11-13 PROCEDURE — 3077F PR MOST RECENT SYSTOLIC BLOOD PRESSURE >= 140 MM HG: ICD-10-PCS | Mod: HCNC,CPTII,S$GLB, | Performed by: PODIATRIST

## 2023-11-13 PROCEDURE — 99204 PR OFFICE/OUTPT VISIT, NEW, LEVL IV, 45-59 MIN: ICD-10-PCS | Mod: HCNC,S$GLB,, | Performed by: PODIATRIST

## 2023-11-13 PROCEDURE — 1159F MED LIST DOCD IN RCRD: CPT | Mod: HCNC,CPTII,S$GLB, | Performed by: PODIATRIST

## 2023-11-13 PROCEDURE — 3079F PR MOST RECENT DIASTOLIC BLOOD PRESSURE 80-89 MM HG: ICD-10-PCS | Mod: HCNC,CPTII,S$GLB, | Performed by: PODIATRIST

## 2023-11-13 PROCEDURE — 99999 PR PBB SHADOW E&M-EST. PATIENT-LVL IV: CPT | Mod: PBBFAC,HCNC,, | Performed by: PODIATRIST

## 2023-11-13 PROCEDURE — 99204 OFFICE O/P NEW MOD 45 MIN: CPT | Mod: HCNC,S$GLB,, | Performed by: PODIATRIST

## 2023-11-13 PROCEDURE — 3079F DIAST BP 80-89 MM HG: CPT | Mod: HCNC,CPTII,S$GLB, | Performed by: PODIATRIST

## 2023-11-13 RX ORDER — AMMONIUM LACTATE 12 G/100G
1 CREAM TOPICAL DAILY
Qty: 140 G | Refills: 1 | Status: ON HOLD | OUTPATIENT
Start: 2023-11-13 | End: 2024-01-04

## 2023-11-13 RX ORDER — INFLUENZA A VIRUS A/VICTORIA/4897/2022 IVR-238 (H1N1) ANTIGEN (FORMALDEHYDE INACTIVATED), INFLUENZA A VIRUS A/DARWIN/9/2021 SAN-010 (H3N2) ANTIGEN (FORMALDEHYDE INACTIVATED), INFLUENZA B VIRUS B/PHUKET/3073/2013 ANTIGEN (FORMALDEHYDE INACTIVATED), AND INFLUENZA B VIRUS B/MICHIGAN/01/2021 ANTIGEN (FORMALDEHYDE INACTIVATED) 60; 60; 60; 60 UG/.7ML; UG/.7ML; UG/.7ML; UG/.7ML
INJECTION, SUSPENSION INTRAMUSCULAR
Status: ON HOLD | COMMUNITY
Start: 2023-10-02 | End: 2024-01-04

## 2023-11-13 RX ORDER — PROPOFOL 10 MG/ML
INJECTION, EMULSION INTRAVENOUS
Status: ON HOLD | COMMUNITY
Start: 2023-08-23 | End: 2024-01-04

## 2023-11-13 NOTE — PROGRESS NOTES
Subjective:      Patient ID: Jeremy Martinez is a 68 y.o. male.    Chief Complaint:   Nail Problem (Bilateral) and Numbness (Bilateral)    Jeremy ARORA is a 68 y.o. male who presents to the clinic complaining of thick and discolored toenails on both feet. Jeremy ARORA is inquiring about treatment options.     Finished chemo this summer  Used some funginail antifungal and saw some improvement    + numbess pain to both feet..  Often across the top of the feet. night hard time sleeping. Was told it was part of the chemo    Wife is a pt   Past Medical History:   Diagnosis Date    Erectile dysfunction     GERD (gastroesophageal reflux disease) 2010    Hyperlipidemia 2008    Hypertension 2007     Past Surgical History:   Procedure Laterality Date    COLONOSCOPY N/A 08/01/2017    Procedure: COLONOSCOPY;  Surgeon: AMBER Restrepo MD;  Location: [facility] (4TH FLR);  Service: Endoscopy;  Laterality: N/A;    ENDOSCOPIC ULTRASOUND OF UPPER GASTROINTESTINAL TRACT N/A 02/20/2023    Procedure: ULTRASOUND, UPPER GI TRACT, ENDOSCOPIC;  Surgeon: Ady Cornell MD;  Location: [facility] (2ND FLR);  Service: Endoscopy;  Laterality: N/A;    ESOPHAGOGASTRODUODENOSCOPY N/A 02/20/2023    Procedure: EGD (ESOPHAGOGASTRODUODENOSCOPY);  Surgeon: Ady Cornell MD;  Location: [facility] (2ND FLR);  Service: Endoscopy;  Laterality: N/A;    ESOPHAGOGASTRODUODENOSCOPY N/A 08/23/2023    Procedure: ESOPHAGOGASTRODUODENOSCOPY (EGD);  Surgeon: Ady Cornell MD;  Location: [facility] (2ND FLR);  Service: Endoscopy;  Laterality: N/A;  Upper EUS. DLBCL s/p treatment    INSERTION OF TUNNELED CENTRAL VENOUS CATHETER (CVC) WITH SUBCUTANEOUS PORT Right 03/17/2023    Procedure: INSERTION, PORT-A-CATH;  Surgeon: Will Corrales II, MD;  Location: [facility] [place] CATH LAB;  Service: Interventional Radiology;  Laterality: Right;    TOTAL KNEE ARTHROPLASTY  2010    right     Current Outpatient Medications on File Prior to Visit   Medication Sig Dispense Refill    DIPRIVAN 10  mg/mL infusion       FLUZONE HIGHDOSE QUAD 23-24  mcg/0.7 mL Syrg       KENALOG 40 mg/mL injection       lisinopriL (PRINIVIL,ZESTRIL) 40 MG tablet Take 1 tablet (40 mg total) by mouth once daily. 90 tablet 3    magnesium hydroxide 400 mg/5 ml (MILK OF MAGNESIA) 400 mg/5 mL Susp Take 30 mLs (2,400 mg total) by mouth daily as needed. 118 mL 2    allopurinoL (ZYLOPRIM) 100 MG tablet Take 3 tablets by mouth once daily (Patient not taking: Reported on 10/10/2023) 90 tablet 3    azelastine (ASTELIN) 137 mcg (0.1 %) nasal spray 1 spray (137 mcg total) by Nasal route 2 (two) times daily. (Patient not taking: Reported on 10/10/2023) 30 mL 3    omeprazole (PRILOSEC) 40 MG capsule Take 1 capsule (40 mg total) by mouth 2 (two) times daily before meals. (Patient not taking: Reported on 10/10/2023) 180 capsule 1    potassium chloride SA (K-DUR,KLOR-CON) 20 MEQ tablet Take 1 tablet (20 mEq total) by mouth 3 (three) times daily. (Patient not taking: Reported on 10/10/2023) 30 tablet 11    sildenafiL (VIAGRA) 100 MG tablet Take 1 tablet (100 mg total) by mouth as needed for Erectile Dysfunction. (Patient not taking: Reported on 10/10/2023) 12 tablet 3     No current facility-administered medications on file prior to visit.     Review of patient's allergies indicates:   Allergen Reactions    Amoxicillin Hives and Rash       Review of Systems   Constitutional: Negative for chills, decreased appetite, fever, malaise/fatigue, night sweats, weight gain and weight loss.   Cardiovascular:  Negative for chest pain, claudication, dyspnea on exertion, leg swelling, palpitations and syncope.   Respiratory:  Negative for cough and shortness of breath.    Endocrine: Negative for cold intolerance and heat intolerance.   Hematologic/Lymphatic: Negative for bleeding problem. Does not bruise/bleed easily.   Skin:  Positive for dry skin and nail changes. Negative for color change, flushing, itching, poor wound healing, rash, skin cancer,  "suspicious lesions and unusual hair distribution.   Musculoskeletal:  Negative for arthritis, back pain, falls, gout, joint pain, joint swelling, muscle cramps, muscle weakness, myalgias, neck pain and stiffness.   Gastrointestinal:  Negative for diarrhea, nausea and vomiting.   Neurological:  Positive for numbness and paresthesias. Negative for dizziness, focal weakness, light-headedness, tremors, vertigo and weakness.   Psychiatric/Behavioral:  Negative for altered mental status and depression. The patient does not have insomnia.    Allergic/Immunologic: Negative.            Objective:       Vitals:    11/13/23 1013   BP: (!) 147/87   Pulse: 61   Weight: 60.4 kg (133 lb 2.5 oz)   Height: 5' 6" (1.676 m)   PainSc: 0-No pain   60.4 kg (133 lb 2.5 oz)     Physical Exam  Vitals reviewed.   Constitutional:       General: He is not in acute distress.     Appearance: He is well-developed. He is not ill-appearing, toxic-appearing or diaphoretic.      Comments: Proper supportive shoegear      Cardiovascular:      Pulses:           Dorsalis pedis pulses are 1+ on the right side and 1+ on the left side.        Posterior tibial pulses are 2+ on the right side and 2+ on the left side.   Musculoskeletal:         General: No swelling, tenderness or deformity.      Right lower leg: No edema.      Left lower leg: No edema.      Right ankle: Normal.      Right Achilles Tendon: Normal.      Left ankle: Normal.      Left Achilles Tendon: Normal.      Right foot: Decreased range of motion. No deformity.      Left foot: Decreased range of motion. No deformity.      Comments: No pop     Strength 5/5   Feet:      Right foot:      Protective Sensation: 10 sites tested.  10 sites sensed.      Skin integrity: Dry skin present. No ulcer, blister, skin breakdown, erythema, warmth or callus.      Toenail Condition: Right toenails are abnormally thick.      Left foot:      Protective Sensation: 10 sites tested.  10 sites sensed.      Skin " integrity: Dry skin present. No ulcer, blister, skin breakdown, erythema, warmth or callus.      Toenail Condition: Left toenails are abnormally thick.      Comments: Slight decrease swm to digits  Skin:     General: Skin is warm and dry.      Capillary Refill: Capillary refill takes 2 to 3 seconds.      Coloration: Skin is not pale.      Findings: No erythema or rash.      Nails: There is no clubbing.   Neurological:      Mental Status: He is alert and oriented to person, place, and time.      Sensory: Sensory deficit present.   Psychiatric:         Attention and Perception: Attention normal.         Mood and Affect: Mood normal.         Speech: Speech normal.         Behavior: Behavior normal.         Thought Content: Thought content normal.         Cognition and Memory: Cognition normal.         Judgment: Judgment normal.               Assessment:       Encounter Diagnoses   Name Primary?    Onychomycosis     Drug-induced polyneuropathy Yes    Disease of nail          Plan:       Jeremy ARORA was seen today for nail problem and numbness.    Diagnoses and all orders for this visit:    Drug-induced polyneuropathy    Onychomycosis  -     Ambulatory referral/consult to Podiatry    Disease of nail    Other orders  -     ammonium lactate 12 % Crea; Apply 1 g topically Daily.      I counseled the patient on his conditions, their implications and medical management.      Rx amm lac for nails    Rx topical pain neuropathy cream from p.a.p.    Patient can discuss further with Heme-Onc or primary about oral gabapentin Lyrica or other oral medications for his painful neuropathy    - Shoe inspection. Patient instructed on proper foot hygeine. We discussed wearing proper shoe gear, daily foot inspections, never walking without protective shoe gear, never putting sharp instruments to feet, routine podiatric nail visits every 2-3 months.      - With patient's permission, nails were aggressively reduced and debrided x 10 to their  soft tissue attachment mechanically removing all offending nail and debris.     - continue proper shoe gear monitor feet    - Can consider x-rays none indicated today          Follow up in about 3 months (around 2/13/2024), or if symptoms worsen or fail to improve.

## 2023-11-17 ENCOUNTER — INFUSION (OUTPATIENT)
Dept: INFUSION THERAPY | Facility: HOSPITAL | Age: 68
End: 2023-11-17
Payer: MEDICARE

## 2023-11-17 DIAGNOSIS — C83.38 DIFFUSE LARGE B-CELL LYMPHOMA OF LYMPH NODES OF MULTIPLE REGIONS: Primary | ICD-10-CM

## 2023-11-17 PROCEDURE — 96523 IRRIG DRUG DELIVERY DEVICE: CPT | Mod: HCNC

## 2023-11-17 PROCEDURE — 25000003 PHARM REV CODE 250: Mod: HCNC | Performed by: INTERNAL MEDICINE

## 2023-11-17 PROCEDURE — A4216 STERILE WATER/SALINE, 10 ML: HCPCS | Mod: HCNC | Performed by: INTERNAL MEDICINE

## 2023-11-17 PROCEDURE — 63600175 PHARM REV CODE 636 W HCPCS: Mod: HCNC | Performed by: INTERNAL MEDICINE

## 2023-11-17 RX ORDER — HEPARIN 100 UNIT/ML
500 SYRINGE INTRAVENOUS
Status: CANCELLED | OUTPATIENT
Start: 2023-11-17

## 2023-11-17 RX ORDER — SODIUM CHLORIDE 0.9 % (FLUSH) 0.9 %
10 SYRINGE (ML) INJECTION
Status: DISCONTINUED | OUTPATIENT
Start: 2023-11-17 | End: 2023-11-17 | Stop reason: HOSPADM

## 2023-11-17 RX ORDER — SODIUM CHLORIDE 0.9 % (FLUSH) 0.9 %
10 SYRINGE (ML) INJECTION
Status: CANCELLED | OUTPATIENT
Start: 2023-11-17

## 2023-11-17 RX ORDER — HEPARIN 100 UNIT/ML
500 SYRINGE INTRAVENOUS
Status: DISCONTINUED | OUTPATIENT
Start: 2023-11-17 | End: 2023-11-17 | Stop reason: HOSPADM

## 2023-11-17 RX ADMIN — SODIUM CHLORIDE, PRESERVATIVE FREE 10 ML: 5 INJECTION INTRAVENOUS at 08:11

## 2023-11-17 RX ADMIN — HEPARIN 500 UNITS: 100 SYRINGE at 08:11

## 2023-11-17 NOTE — NURSING
Pt here for port flush. Flushed PAC with NS and heparin, blood return noted. No questions or concerns. Pt ambulated out of unit unassisted.

## 2023-11-19 ENCOUNTER — PATIENT MESSAGE (OUTPATIENT)
Dept: HEMATOLOGY/ONCOLOGY | Facility: CLINIC | Age: 68
End: 2023-11-19
Payer: MEDICARE

## 2023-12-20 ENCOUNTER — PATIENT OUTREACH (OUTPATIENT)
Dept: ADMINISTRATIVE | Facility: HOSPITAL | Age: 68
End: 2023-12-20
Payer: MEDICARE

## 2023-12-20 NOTE — PROGRESS NOTES
Health Maintenance Due   Topic Date Due    RSV Vaccine (Age 60+ and Pregnant patients) (1 - 1-dose 60+ series) Never done     Population Health Chart Review & Patient Outreach Details    Updates Requested / Reviewed:     [x]  Care Everywhere    []     []  External Sources ([facility], [facility], [facility], etc.)    [] [facility]   [] [facility]   [] Other:    [x]  Care Team Updated   []  Removed  or Duplicate Orders   [x]  Immunization Reconciliation Completed / Queried    [x] [place]   [] [place]   [] [place]   [] [place]      Health Maintenance Topics Addressed and Outreach Outcomes / Actions Taken:             Breast Cancer Screening []  Mammogram Order Placed    []  Mammogram Screening Scheduled    []  External Records Requested & Care Team Updated if Applicable    []  External Records Uploaded & Care Team Updated if Applicable    []  Pt Declined Scheduling Mammogram    []  Pt Will Schedule with External Provider / Order Routed & Care Team Updated if Applicable              Cervical Cancer Screening []  Pap Smear Scheduled in Primary Care or OBGYN    []  External Records Requested & Care Team Updated if Applicable       []  External Records Uploaded, Care Team Updated, & History Updated if Applicable    []  Patient Declined Scheduling Pap Smear    []  Patient Will Schedule with External Provider & Care Team Updated if Applicable                  Colorectal Cancer Screening []  Colonoscopy Case Request / Referral / Home Test Order Placed    []  External Records Requested & Care Team Updated if Applicable    []  External Records Uploaded, Care Team Updated, & History Updated if Applicable    []  Patient Declined Completing Colon Cancer Screening    []  Patient Will Schedule with External Provider & Care Team Updated if Applicable    []  Fit Kit Mailed (add the SmartPhrase under additional notes)    []  Reminded Patient to Complete Home Test                Diabetic Eye Exam []  Eye Exam Screening Order Placed     []  Eye Camera Scheduled or Optometry/Ophthalmology Referral Placed    []  External Records Requested & Care Team Updated if Applicable    []  External Records Uploaded, Care Team Updated, & History Updated if Applicable    []  Patient Declined Scheduling Eye Exam    []  Patient Will Schedule with External Provider & Care Team Updated if Applicable             Blood Pressure Control []  Primary Care Follow Up Visit Scheduled     []  Remote Blood Pressure Reading Captured    []  Patient Declined Remote Reading or Scheduling Appt - Escalated to PCP    []  Patient Will Call Back or Send Portal Message with Reading                 HbA1c & Other Labs []  Overdue Lab(s) Ordered    []  Overdue Lab(s) Scheduled    []  External Records Uploaded & Care Team Updated if Applicable    []  Primary Care Follow Up Visit Scheduled     []  Reminded Patient to Complete A1c Home Test    []  Patient Declined Scheduling Labs or Will Call Back to Schedule    []  Patient Will Schedule with External Provider / Order Routed, & Care Team Updated if Applicable           Primary Care Appointment []  Primary Care Appt Scheduled    []  Patient Declined Scheduling or Will Call Back to Schedule    []  Pt Established with External Provider, Updated Care Team, & Informed Pt to Notify Payor if Applicable           Medication Adherence /    Statin Use []  Primary Care Appointment Scheduled    []  Patient Reminded to  Prescription    []  Patient Declined, Provider Notified if Needed    []  Sent Provider Message to Review to Evaluate Pt for Statin, Add Exclusion Dx Codes, Document   Exclusion in Problem List, Change Statin Intensity Level to Moderate or High Intensity if Applicable                Osteoporosis Screening []  Dexa Order Placed    []  Dexa Appointment Scheduled    []  External Records Requested & Care Team Updated    []  External Records Uploaded, Care Team Updated, & History Updated if Applicable    []  Patient Declined Scheduling  Dexa or Will Call Back to Schedule    []  Patient Will Schedule with External Provider / Order Routed & Care Team Updated if Applicable       Additional Notes:    Chart review completed for medication adherence as part of October [place] Gap List report.

## 2023-12-29 ENCOUNTER — INFUSION (OUTPATIENT)
Dept: INFUSION THERAPY | Facility: HOSPITAL | Age: 68
End: 2023-12-29
Payer: MEDICARE

## 2023-12-29 ENCOUNTER — OFFICE VISIT (OUTPATIENT)
Dept: HEMATOLOGY/ONCOLOGY | Facility: CLINIC | Age: 68
End: 2023-12-29
Payer: MEDICARE

## 2023-12-29 VITALS
WEIGHT: 139 LBS | BODY MASS INDEX: 22.34 KG/M2 | RESPIRATION RATE: 17 BRPM | TEMPERATURE: 97 F | DIASTOLIC BLOOD PRESSURE: 88 MMHG | SYSTOLIC BLOOD PRESSURE: 158 MMHG | HEART RATE: 78 BPM | HEIGHT: 66 IN

## 2023-12-29 DIAGNOSIS — D64.9 ANEMIA OF UNKNOWN ETIOLOGY: ICD-10-CM

## 2023-12-29 DIAGNOSIS — G62.0 DRUG-INDUCED POLYNEUROPATHY: ICD-10-CM

## 2023-12-29 DIAGNOSIS — C83.38 DIFFUSE LARGE B-CELL LYMPHOMA OF LYMPH NODES OF MULTIPLE REGIONS: Primary | ICD-10-CM

## 2023-12-29 DIAGNOSIS — K21.9 GASTROESOPHAGEAL REFLUX DISEASE, UNSPECIFIED WHETHER ESOPHAGITIS PRESENT: Chronic | ICD-10-CM

## 2023-12-29 DIAGNOSIS — R53.82 CHRONIC FATIGUE: ICD-10-CM

## 2023-12-29 DIAGNOSIS — E78.5 HYPERLIPIDEMIA, UNSPECIFIED HYPERLIPIDEMIA TYPE: Chronic | ICD-10-CM

## 2023-12-29 PROCEDURE — 3008F BODY MASS INDEX DOCD: CPT | Mod: HCNC,CPTII,S$GLB, | Performed by: INTERNAL MEDICINE

## 2023-12-29 PROCEDURE — 4010F PR ACE/ARB THEARPY RXD/TAKEN: ICD-10-PCS | Mod: HCNC,CPTII,S$GLB, | Performed by: INTERNAL MEDICINE

## 2023-12-29 PROCEDURE — 99214 OFFICE O/P EST MOD 30 MIN: CPT | Mod: HCNC,S$GLB,, | Performed by: INTERNAL MEDICINE

## 2023-12-29 PROCEDURE — 3288F FALL RISK ASSESSMENT DOCD: CPT | Mod: HCNC,CPTII,S$GLB, | Performed by: INTERNAL MEDICINE

## 2023-12-29 PROCEDURE — 3079F PR MOST RECENT DIASTOLIC BLOOD PRESSURE 80-89 MM HG: ICD-10-PCS | Mod: HCNC,CPTII,S$GLB, | Performed by: INTERNAL MEDICINE

## 2023-12-29 PROCEDURE — 63600175 PHARM REV CODE 636 W HCPCS: Mod: HCNC | Performed by: INTERNAL MEDICINE

## 2023-12-29 PROCEDURE — 3288F PR FALLS RISK ASSESSMENT DOCUMENTED: ICD-10-PCS | Mod: HCNC,CPTII,S$GLB, | Performed by: INTERNAL MEDICINE

## 2023-12-29 PROCEDURE — 25000003 PHARM REV CODE 250: Mod: HCNC | Performed by: INTERNAL MEDICINE

## 2023-12-29 PROCEDURE — 3077F PR MOST RECENT SYSTOLIC BLOOD PRESSURE >= 140 MM HG: ICD-10-PCS | Mod: HCNC,CPTII,S$GLB, | Performed by: INTERNAL MEDICINE

## 2023-12-29 PROCEDURE — 3079F DIAST BP 80-89 MM HG: CPT | Mod: HCNC,CPTII,S$GLB, | Performed by: INTERNAL MEDICINE

## 2023-12-29 PROCEDURE — 99214 PR OFFICE/OUTPT VISIT, EST, LEVL IV, 30-39 MIN: ICD-10-PCS | Mod: HCNC,S$GLB,, | Performed by: INTERNAL MEDICINE

## 2023-12-29 PROCEDURE — 3077F SYST BP >= 140 MM HG: CPT | Mod: HCNC,CPTII,S$GLB, | Performed by: INTERNAL MEDICINE

## 2023-12-29 PROCEDURE — 1101F PT FALLS ASSESS-DOCD LE1/YR: CPT | Mod: HCNC,CPTII,S$GLB, | Performed by: INTERNAL MEDICINE

## 2023-12-29 PROCEDURE — A4216 STERILE WATER/SALINE, 10 ML: HCPCS | Mod: HCNC | Performed by: INTERNAL MEDICINE

## 2023-12-29 PROCEDURE — 96523 IRRIG DRUG DELIVERY DEVICE: CPT | Mod: HCNC

## 2023-12-29 PROCEDURE — 4010F ACE/ARB THERAPY RXD/TAKEN: CPT | Mod: HCNC,CPTII,S$GLB, | Performed by: INTERNAL MEDICINE

## 2023-12-29 PROCEDURE — 99999 PR PBB SHADOW E&M-EST. PATIENT-LVL III: ICD-10-PCS | Mod: PBBFAC,HCNC,, | Performed by: INTERNAL MEDICINE

## 2023-12-29 PROCEDURE — 1126F PR PAIN SEVERITY QUANTIFIED, NO PAIN PRESENT: ICD-10-PCS | Mod: HCNC,CPTII,S$GLB, | Performed by: INTERNAL MEDICINE

## 2023-12-29 PROCEDURE — 99999 PR PBB SHADOW E&M-EST. PATIENT-LVL III: CPT | Mod: PBBFAC,HCNC,, | Performed by: INTERNAL MEDICINE

## 2023-12-29 PROCEDURE — 1126F AMNT PAIN NOTED NONE PRSNT: CPT | Mod: HCNC,CPTII,S$GLB, | Performed by: INTERNAL MEDICINE

## 2023-12-29 PROCEDURE — 1101F PR PT FALLS ASSESS DOC 0-1 FALLS W/OUT INJ PAST YR: ICD-10-PCS | Mod: HCNC,CPTII,S$GLB, | Performed by: INTERNAL MEDICINE

## 2023-12-29 PROCEDURE — 3008F PR BODY MASS INDEX (BMI) DOCUMENTED: ICD-10-PCS | Mod: HCNC,CPTII,S$GLB, | Performed by: INTERNAL MEDICINE

## 2023-12-29 RX ORDER — HEPARIN 100 UNIT/ML
500 SYRINGE INTRAVENOUS
OUTPATIENT
Start: 2023-12-29

## 2023-12-29 RX ORDER — SODIUM CHLORIDE 0.9 % (FLUSH) 0.9 %
10 SYRINGE (ML) INJECTION
Status: DISCONTINUED | OUTPATIENT
Start: 2023-12-29 | End: 2023-12-29 | Stop reason: HOSPADM

## 2023-12-29 RX ORDER — SODIUM CHLORIDE 0.9 % (FLUSH) 0.9 %
10 SYRINGE (ML) INJECTION
OUTPATIENT
Start: 2023-12-29

## 2023-12-29 RX ORDER — HEPARIN 100 UNIT/ML
500 SYRINGE INTRAVENOUS
Status: DISCONTINUED | OUTPATIENT
Start: 2023-12-29 | End: 2023-12-29 | Stop reason: HOSPADM

## 2023-12-29 RX ADMIN — HEPARIN 500 UNITS: 100 SYRINGE at 10:12

## 2023-12-29 RX ADMIN — SODIUM CHLORIDE, PRESERVATIVE FREE 10 ML: 5 INJECTION INTRAVENOUS at 10:12

## 2023-12-29 NOTE — NURSING
Pt here for port flush. Flushed PAC with NS and heparin. No questions or concerns. Pt ambulated out of unit unassisted.

## 2023-12-29 NOTE — PROGRESS NOTES
CC: DLBCL, hematology follow up    HPI: , 68, M, is here for hematology follow up. This is a virtual visit.  He has diffuse large B cell lymphoma. He has GERD, HLD, HTn. He presented to outside hospital in February 2023 with epigastric pain.  He was found to have an esophageal mass and [place] [facility] [place] He underwent endoscopy with biopsy of GI mass . He remained stable on liquid diet.     He has lost ~30 lbs in the past 6 months. Denies fevers, night sweats, upper/lower GI bleeding.     Interval History: He is here for follow up following completion of chemo therapy and repeat EGD. No GI issues now.  Weight remains stable. Denies night sweats. He has tingling and numbness in his hands and feet.  Denies fever, chills, black stools, hematochezia, heron.     Review of patient's allergies indicates:   Allergen Reactions    Amoxicillin Hives and Rash       Current Outpatient Medications   Medication Sig    ammonium lactate 12 % Crea Apply 1 g topically Daily.    DIPRIVAN 10 mg/mL infusion     FLUZONE HIGHDOSE QUAD 23-24  mcg/0.7 mL Syrg     KENALOG 40 mg/mL injection     lisinopriL (PRINIVIL,ZESTRIL) 40 MG tablet Take 1 tablet (40 mg total) by mouth once daily.    magnesium hydroxide 400 mg/5 ml (MILK OF MAGNESIA) 400 mg/5 mL Susp Take 30 mLs (2,400 mg total) by mouth daily as needed.    allopurinoL (ZYLOPRIM) 100 MG tablet Take 3 tablets by mouth once daily (Patient not taking: Reported on 10/10/2023)    azelastine (ASTELIN) 137 mcg (0.1 %) nasal spray 1 spray (137 mcg total) by Nasal route 2 (two) times daily. (Patient not taking: Reported on 10/10/2023)    omeprazole (PRILOSEC) 40 MG capsule Take 1 capsule (40 mg total) by mouth 2 (two) times daily before meals. (Patient not taking: Reported on 10/10/2023)    potassium chloride SA (K-DUR,KLOR-CON) 20 MEQ tablet Take 1 tablet (20 mEq total) by mouth 3 (three) times daily. (Patient not taking: Reported on 10/10/2023)    sildenafiL  (VIAGRA) 100 MG tablet Take 1 tablet (100 mg total) by mouth as needed for Erectile Dysfunction. (Patient not taking: Reported on 10/10/2023)     No current facility-administered medications for this visit.          Review of Systems   Constitutional:  Positive for malaise/fatigue (improving). Negative for chills, diaphoresis, fever and weight loss (remains stable now).   HENT:  Negative for congestion, ear pain, hearing loss, nosebleeds, sinus pain and tinnitus.    Eyes:  Negative for blurred vision, double vision, photophobia, pain and discharge.   Respiratory:  Negative for cough, hemoptysis and sputum production.    Cardiovascular:  Negative for palpitations, orthopnea, claudication and leg swelling.   Gastrointestinal:  Negative for abdominal pain (resolved), blood in stool, constipation (resolved), diarrhea, heartburn (improved), melena, nausea (occasionally) and vomiting.   Genitourinary:  Negative for dysuria, frequency, hematuria and urgency.   Musculoskeletal:  Negative for back pain, myalgias and neck pain.   Neurological:  Positive for tingling. Negative for tremors, focal weakness, weakness and headaches.   Endo/Heme/Allergies:  Negative for environmental allergies.   Psychiatric/Behavioral:  Negative for depression, hallucinations and substance abuse. The patient is not nervous/anxious and does not have insomnia.         Vitals:    12/29/23 0928   BP: (!) 158/88   Pulse: 78   Resp: 17   Temp: 97.4 °F (36.3 °C)       PS: ECOG 1    Physical Exam  HENT:      Head: Normocephalic and atraumatic.   Eyes:      General: No scleral icterus.  Cardiovascular:      Rate and Rhythm: Normal rate and regular rhythm.      Heart sounds: No murmur heard.  Pulmonary:      Breath sounds: No stridor.   Abdominal:      General: There is no distension.      Palpations: There is no mass.      Tenderness: There is no abdominal tenderness.   Musculoskeletal:         General: No swelling.   Lymphadenopathy:      Cervical: No  cervical adenopathy.   Skin:     Coloration: Skin is not jaundiced.   Neurological:      Mental Status: He is alert.          2/18/23 CT abdomen pelvis with contrast    COMPARISON:  None     FINDINGS:  Abdomen:     - Lung bases: There is focal emphysematous change in the medial left lung base.  Lung bases contain mild areas of peripheral chronic atelectasis.  A focus of suspected atelectasis simulates a nodular opacity at the right dome of the diaphragm.     There is distal esophageal distension with fluid secondary to and infiltrative type mass involving the visualized cardiac portion of the stomach extending to the GE junction concerning for malignancy.  There is a mantle of lobular adenopathy surrounding the proximal abdominal aorta appearing to involve the retrocrural lymph nodes and adjacent periaortic and pericaval lymph nodes to the level of the left renal vein with the renal a vein somewhat displaced.  No visible renal vein invasion/thrombosis.  Smaller shotty type lymph nodes are seen caudal to the renal veins in the retroperitoneum.     - Liver: The liver appears homogeneous and not significantly enlarged.     - Gallbladder: There is no CT evidence of cholecystitis or cholelithiasis.     - Bile Ducts: No evidence of intra or extra hepatic biliary ductal dilation.     - Spleen: Negative.     - Kidneys: The kidneys enhance symmetrically and homogeneously.     - Adrenals: Unremarkable.     - Pancreas: Pancreas is displaced anteriorly by the retroperitoneal adenopathy with an ill-defined mass around 3 cm in diameter in the body appearing contiguous with the pancreatic parenchyma and similar in density to the retroperitoneal adenopathy consistent with involvement/invasion.  Additionally there is no fat plane margin between the posterior pancreatic body and the lobular retroperitoneal mantle of adenopathy.  Heterogeneous enhancement of the body of the pancreas in this region and poor visualization of the splenic  artery and vein within the body suggests vessel encasement/involvement.  Partial thrombosis of portions of these vessels is suspected.     -Vascular: No abdominal aortic aneurysm or significant atherosclerosis.     - Abdominal wall:  There is a small right inguinal hernia.     Small bowel and colon: There is no small bowel distension.  There is no mesenteric convincing adenopathy.  Shotty lymph nodes are noted.  No extraluminal free air or free fluid is seen in the abdomen or pelvis.  Appendix is not isolated as a separate structure however there is no evidence of appendicitis.  Few scattered diverticuli are seen distally in the colon.  No diverticulitis.     Pelvis:     There is no pelvic mass, adenopathy, or free fluid.  Bladder is unremarkable noting mild thickening of the mucosa can be explained by under distension.  The prostate is borderline mildly prominent.     Bones:  No acute osseous abnormality and no suspicious lytic or blastic lesion.     Impression:     Bulky infiltrative type mass compatible with malignancy involving the gastric cardiac portion extending to the GE junction with esophageal high-grade obstruction .  Further evaluation can be made with CT chest.     Bulky adenopathy in the retroperitoneum in the periaortic and pericaval region to the level of the left renal vein also appearing to involve the dorsal pancreatic body and also appearing to involve and obstruct the splenic artery and vein as described.     Incidental additional nonacute findings are discussed in the body of the report.       2/20/23 upper GI EUS     --One extrinsic moderate stenosis was found at the gastroesophageal junction. The stenosis was traversed.      --  A large, fungating and infiltrative mass with oozing bleeding was found in the cardia, in the gastric fundus and in the gastric body.        --Biopsies were taken with a cold forceps for histology.        --The examined duodenum was normal.          ENDOSONOGRAPHIC  FINDING:          -- The esophagus and adjacent structures were visualized endosonographically.      --  A hypoechoic mass was identified endosonographically in the cardia of the stomach and in the fundus of the stomach (seen from the GE  junction, as EUS scope could not traverse the area of extrinsic        stenosis). The mass measured 52 mm by 62 mm in maximal cross-sectional diameter.     Impression:            - Extrinsic narrowing of the esophagus.                          - Gastric tumor in the cardia, in the gastric fundus and in the gastric body. Biopsied.                          - Normal examined duodenum.         1. GASTRIC MASS BIOPSY:            -  Diffuse large B-cell lymphoma, non-germinal center subtype          -  High proliferation index (99% by Ki-67 immunostain)          -  Negative for CD30 co-expression by immunostain          -  TREY JANA positive for EBV-encoded small mRNAs in  few scattered   bystander lymphocytes          -   PENDING  (send-out): MYC tech only immunostain and Double/Triple Hit Lymphoma FISH panel with results to be reported in a separate supplemental and final diagnosis may be further classified             at that time   COMMENT: No flow cytometric analysis was performed on this specimen    Low and high power examination reveal minute irregular fragments of gastric tissue with an atypical diffuse submucosal infiltrate with focal crush artifacts and composed predominantly of large lymphoma cells with occasional   prominent central nucleoli and focal areas with increased scattered mature eosinophils and rarer scattered neutrophils, histiocytes, small mature lymphocytes, and plasma cells.   AE1/AE3, Synaptophysin, and Chromogranin immunostains were performed with adequate controls on block 1A by the referring surgical pathologist who   initially reviewed this biopsy and are negative for carcinoma or infiltrating neuroendocrine cells.   Immunohistochemical study with stains for  CD3, CD5, BCL-2, CD20, CD23, Cyclin D1, CD10, BCL-6, MUM-1, CD30, and Ki-67 and EBV-encoded small mRNAs in-situ hybridization (TREY JANA) were performed on block 1A with appropriate controls   for increased sensitivity and cellular localization within the cells of interest.  Unstained slides of block 1A were sent out to [facility]atories for MYC tech only immunostain with results to be interpreted at [facility] and Double/Triple Hit Lymphoma FISH panel.   The large lymphoma cells are positive for CD20 and MUM-1 (nuclear) and negative for AE1/AE3, Synaptophysin, Chromogranin, CD3, CD5, CD23, Cyclin D1   (nuclear), CD10 (dim <30%), BCL-6 (strong nuclear <30%), and CD30. Ki-67 (nuclear) highlights a high proliferation index (99%). TREY JANA stains few scattered small and medium-sized bystander lymphocytes.   AE1/A3 stains mucosal and glandular epithelial cells. Synaptophysin and/or Chromogranin stain a rare subset of normal neuroendocrine cells associated   with the glandular epithelial cells.   CD3, CD5, and BCL-2 co-stain numerous scattered reactive T-cells within the focal non-involved areas of the biopsy. CD5 also appears to stain glandular   epithelial cells and a rare subset of the mucosal epithelial cells. Cyclin D1 (nuclear) stains epithelial and endothelial cells. CD10 stains stromal cells and few scattered neutrophils. Rare scattered medium-sized CD30+ cells (favor   reactive immunoblasts) are identified      3/8/23 hemoglobin electrophoresis: No electrophoretic evidence of abnormal hemoglobin or beta thalassemia. See comment    3/23/23 PET CT      COMPARISON:  CT abdomen pelvis 02/18/2023     FINDINGS:  Quality of the study: Adequate.     Reference values:     Mediastinal blood pool maximum SUV: 1.7     Normal liver background maximum SUV: 1.4     There is lymphadenopathy above below the diaphragm.     In the head and neck, there are no hypermetabolic lesions worrisome for  malignancy. There are no hypermetabolic mucosal lesions, and there are no pathologically enlarged or hypermetabolic lymph nodes.     In the chest, there is mediastinal lymphadenopathy e.g. subcarinal node measuring 1.0 cm with SUV max 28 (image 70) and left upper paratracheal node measuring 0.6 cm with SUV max 12 (image 41).     Small left pleural effusion.  No concerning pulmonary nodules or masses.     In the abdomen and pelvis, there is marked diffuse stomach wall thickening with intense uptake, SUV max 42.  Large wanda conglomerate in the mid abdomen with SUV max 36 (axial fused image 125), difficult to delineate from surrounding bowel and vessels CT.  Additional scattered hypermetabolic lymphadenopathy involving retrocrural nodes, mesenteric nodes, and omental nodules.     There is physiologic tracer distribution within the abdominal organs and excretion into the genitourinary system including probable pooling of excreted tracer within the penile urethra.     The spleen has normal uptake and is nonenlarged at 5 cm in craniocaudal dimension.     In the bones, there are no hypermetabolic lesions worrisome for malignancy.     In the extremities, there are no hypermetabolic lesions worrisome for malignancy.     Incidental CT findings: Right chest port with catheter tip in the right atrium.  Multi-vessel coronary calcifications.     Impression:     1.  In this patient with lymphoma, there is hypermetabolic lymphadenopathy above and below the diaphragm, well as an infiltrative stomach mass consistent with extranodal disease.  Significant lymphadenopathy below the diaphragm is difficult to delineate without intravenous contrast, and bulky disease is not excluded.       3/28/23 ECHO    Normal central venous pressure (3 mmHg).  The estimated PA systolic pressure is 40 mmHg.  The left ventricle is normal in size with normal systolic function.  The estimated ejection fraction is 60%.  Grade I left ventricular diastolic  dysfunction.  Normal right ventricular size with normal right ventricular systolic function.  Mild to moderate tricuspid regurgitation.  Small pericardial effusion.      3/31/23 CSF cytology: Negative for malignant cells      5/30/23 PET CT      Overall significant improvement as compared to previous PET-CT noting near complete resolution of previously seen gastric wall thickening and chest/abdominal lymphadenopathy, noting residual tracer uptake adjacent to the gastric antrum without definite CT correlate and mild residual uptake in the mediastinum/shellie.     Improvement of ascites, trace residual free fluid in the pelvis with heterogeneous radiotracer uptake, possibly due to underlying physiologic bowel uptake.  Recommend attention on follow-up.     Probable mild marrow hyperplasia.      8/3/23 PET CT    COMPARISON:  NM PET-CT 05/30/2023, 03/23/2023     FINDINGS:  Quality of the study: Adequate.     Reference values:     Mediastinal blood pool maximum SUV: 1.8     Normal liver background maximum SUV: 2.4     In the head and neck, there are no hypermetabolic lesions worrisome for malignancy. There are no hypermetabolic mucosal lesions, and there are no pathologically enlarged or hypermetabolic lymph nodes.     In the chest, there are no hypermetabolic lesions worrisome for malignancy.  Stable bilateral pulmonary nodules measuring up to 0.4 cm, too small for PET detection.     In the abdomen and pelvis, there is a small focus of persistent mild radiotracer avidity within the distal gastric wall (image 140) demonstrating maximum SUV of 3.1 (previously 5.4).  No pathologically enlarged or hypermetabolic lymph nodes.  There is physiologic tracer distribution within the abdominal organs and excretion into the genitourinary system.     Spleen is normal size and demonstrates normal uptake.     In the bones, there are no hypermetabolic lesions worrisome for malignancy.     In the extremities, there are no hypermetabolic  lesions worrisome for malignancy.     Incidental CT findings: Right chest wall port with catheter tip terminating within the right atrium.     Impression:     Continued favorable response to therapy with resolution of prior hypermetabolic lymphadenopathy.     Small focus of persistent mild radiotracer avidity within the distal gastric wall however decreased compared to prior exam.  No new hypermetabolic lesion.         8/23/23 EGD      - Normal esophagus.                          - Scar in the gastric fundus.                          - Normal.                          - No specimens collected    Component      Latest Ref [facility] 12/27/2023   WBC      3.90 - 12.70 K/uL 6.40    RBC      4.60 - 6.20 M/uL 4.59 (L)    Hemoglobin      14.0 - 18.0 g/dL 11.6 (L)    Hematocrit      40.0 - 54.0 % 35.5 (L)    MCV      82 - 98 fL 77 (L)    MCH      27.0 - 31.0 pg 25.3 (L)    MCHC      32.0 - 36.0 g/dL 32.7    RDW      11.5 - 14.5 % 14.8 (H)    Platelet Count      150 - 450 K/uL 204    MPV      9.2 - 12.9 fL 10.0    Immature Granulocytes      0.0 - 0.5 % 0.3    Gran # (ANC)      1.8 - 7.7 K/uL 1.9    Immature Grans (Abs)      0.00 - 0.04 K/uL 0.02    Lymph #      1.0 - 4.8 K/uL 3.4    Mono #      0.3 - 1.0 K/uL 0.8    Eos #      0.0 - 0.5 K/uL 0.3    Baso #      0.00 - 0.20 K/uL 0.04    nRBC      0 /100 WBC 0    Gran %      38.0 - 73.0 % 29.5 (L)    Lymph %      18.0 - 48.0 % 53.3 (H)    Mono %      4.0 - 15.0 % 11.9    Eosinophil %      0.0 - 8.0 % 4.4    Basophil %      0.0 - 1.9 % 0.6    Differential Method Automated    Sodium      136 - 145 mmol/L 130 (L)    Potassium      3.5 - 5.1 mmol/L 4.3    Chloride      95 - 110 mmol/L 99    CO2      23 - 29 mmol/L 24    Glucose      70 - 110 mg/dL 96    BUN      8 - 23 mg/dL 14    Creatinine      0.5 - 1.4 mg/dL 0.9    Calcium      8.7 - 10.5 mg/dL 9.3    PROTEIN TOTAL      6.0 - 8.4 g/dL 7.1    Albumin      3.5 - 5.2 g/dL 3.8    BILIRUBIN TOTAL      0.1 - 1.0 mg/dL 0.6    ALP      55 -  135 U/L 89    AST      10 - 40 U/L 19    ALT      10 - 44 U/L 12    eGFR      >60 mL/min/1.73 m^2 >60.0    Anion Gap      8 - 16 mmol/L 7 (L)    Iron      45 - 160 ug/dL 88    Transferrin      200 - 375 mg/dL 216    TIBC      250 - 450 ug/dL 320    Saturated Iron      20 - 50 % 28    LD      110 - 260 U/L 213    Ferritin      20.0 - 300.0 ng/mL 128    Retic      0.4 - 2.0 % 1.2       Legend:  (L) Low  (H) High      Assessment:    1. History of DLBCL, non-GC type of multiple sites  2. History of DLBCL of stomach  3. Fatigue  4. Microcytic anemia  5. Htn, primary  6. HLD  7.GERD  8. Cancer associated pain  9. 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 Malnutrition      Plan:    1,2: I discussed the diagnosis, prognosis of diffuse large B cell lymphoma. I explained that the treatment is with multi-agent chemotherapy. Staging with PETCT, BM biopsy.  FISH pending. Ki 67 high. He will have CBC, CMP, LDH, uric acid, G6PD, HBV, HIV serology checked. He will start allopurinol 300mg daily to prevent tumor lysis syndrome.  PET CT  on 3/23/23 demonstrated hypermetabolic lymphadenopathy above and below the diaphragm, well as an infiltrative stomach mass consistent with extranodal disease. ECHO showed normal LVEF on 3/28/23.   NCCN IPI score 6, suggesting high risk disease, with estimated 5 year PFS of 30% and estimated 5 year OS of 33%.   Cycle 1 chemotherapy was in-patient, on 3/29/23. Rituximab was omitted from cycle 1 due to risk of GI perforation. IT MTX administered with cycle 1 CHOP as CNS IPI was intermediate/ high. No malignant cells identified in CSF on 3/31/23.   He completed 6 Cycles of CHOP(07/13/23). Mid treatment PET CT with positive response to treatment(05/30/23).  EOT PET(07/26/23) showed a small focus of persistent mild radiotracer avidity within the distal gastric wall , but decreased compared to prior exam.  In the abdomen and pelvis, there was a small focus of persistent mild radiotracer  avidity within the distal gastric wall demonstrating maximum SUV of 3.1 (previously 5.4).No new hypermetabolic lesion.   8/23/23 EGD demonstrated no mass, but a scar was noted in the gastric fundus. This was NOT biopsied. Will consider repeat imaging and/or EGD in 6 months.     3. Improving. He is more active now.    4. TIBC was low, ferritin  was normal,  saturated iron 12% on 2/19/23.  hemoglobin electrophoresis showed normal pattern on 3/8/23. Hgb 11.6g/dl today. Ferritin, TIBC normal.     5,6: He follows with     7. He is on omeprazole BID    8. Pain resolved now, not taking pain meds at this time      10. He has grade 1-2 neuropathy, in upper and lower extremities. He will take gabapentin if neuropathy worsens.     11. He previously had weight loss secondary to poor oral intake. He is on liquid diet. Encouraged use of ENSURE three times daily.  Currently eating well.         Advance Care Planning     Date: 12/29/2023   We previously had discussions regarding his underlying diagnosis, natural history, prognosis, and treatment options.  He was interested in pursuing any and all treatment options available to him, including current chemo therapy. Completed treatment and tolerated well.  Will continue monitor for late chemo effect.   Total time of this visit was 30 minutes, including time spent face to face with patient and also in preparing for today's visit for MDM and documentation. (Medical Decision Making, including consideration of possible diagnoses, management options, complex medical record review, review of diagnostic tests and information, consideration and discussion of significant complications based on comorbidities, and discussion with providers involved with the care of the patient). Greater than 50% was spent face to face with the patient counseling and coordinating care.          BMT Chart Routing      Follow up with physician    Follow up with BRIAN    Provider visit type    Infusion  scheduling note    Injection scheduling note    Labs CBC, CMP, LDH, uric acid and other   Scheduling:  Preferred lab:  Lab interval:  alp[ha globin gene assay   Imaging    Pharmacy appointment    Other referrals       Additional referrals needed  to IR for port removal

## 2024-01-04 ENCOUNTER — HOSPITAL ENCOUNTER (OUTPATIENT)
Facility: OTHER | Age: 69
Discharge: HOME | End: 2024-01-04
Attending: STUDENT IN AN ORGANIZED HEALTH CARE EDUCATION/TRAINING PROGRAM | Admitting: STUDENT IN AN ORGANIZED HEALTH CARE EDUCATION/TRAINING PROGRAM
Payer: MEDICARE

## 2024-01-04 VITALS
SYSTOLIC BLOOD PRESSURE: 141 MMHG | DIASTOLIC BLOOD PRESSURE: 85 MMHG | HEART RATE: 64 BPM | BODY MASS INDEX: 22.5 KG/M2 | OXYGEN SATURATION: 100 % | RESPIRATION RATE: 16 BRPM | WEIGHT: 140 LBS | TEMPERATURE: 98 F | HEIGHT: 66 IN

## 2024-01-04 DIAGNOSIS — R53.82 CHRONIC FATIGUE: ICD-10-CM

## 2024-01-04 DIAGNOSIS — C83.30 DIFFUSE LARGE B CELL LYMPHOMA: ICD-10-CM

## 2024-01-04 DIAGNOSIS — C83.38 DIFFUSE LARGE B-CELL LYMPHOMA OF LYMPH NODES OF MULTIPLE REGIONS: ICD-10-CM

## 2024-01-04 DIAGNOSIS — D64.9 ANEMIA OF UNKNOWN ETIOLOGY: ICD-10-CM

## 2024-01-04 PROCEDURE — 63600175 PHARM REV CODE 636 W HCPCS: Mod: HCNC | Performed by: STUDENT IN AN ORGANIZED HEALTH CARE EDUCATION/TRAINING PROGRAM

## 2024-01-04 PROCEDURE — 25000003 PHARM REV CODE 250: Mod: HCNC | Performed by: STUDENT IN AN ORGANIZED HEALTH CARE EDUCATION/TRAINING PROGRAM

## 2024-01-04 PROCEDURE — 99152 MOD SED SAME PHYS/QHP 5/>YRS: CPT | Mod: HCNC | Performed by: STUDENT IN AN ORGANIZED HEALTH CARE EDUCATION/TRAINING PROGRAM

## 2024-01-04 RX ORDER — LIDOCAINE HYDROCHLORIDE 20 MG/ML
INJECTION, SOLUTION INFILTRATION; PERINEURAL
Status: DISCONTINUED | OUTPATIENT
Start: 2024-01-04 | End: 2024-01-04 | Stop reason: HOSPADM

## 2024-01-04 RX ORDER — MIDAZOLAM HYDROCHLORIDE 1 MG/ML
INJECTION INTRAMUSCULAR; INTRAVENOUS
Status: DISCONTINUED | OUTPATIENT
Start: 2024-01-04 | End: 2024-01-04 | Stop reason: HOSPADM

## 2024-01-04 RX ORDER — FENTANYL CITRATE 50 UG/ML
INJECTION, SOLUTION INTRAMUSCULAR; INTRAVENOUS
Status: DISCONTINUED | OUTPATIENT
Start: 2024-01-04 | End: 2024-01-04 | Stop reason: HOSPADM

## 2024-01-04 NOTE — Clinical Note
The right chest was prepped. The site was prepped with ChloraPrep. The site was clipped. The patient was draped.

## 2024-01-04 NOTE — PROCEDURES
Interventional Radiology Post-Procedure Note    Pre Op Diagnosis: DLBCL having completed therapy  Post Op Diagnosis: Same    Procedure: port removal    Procedure performed by: Sarita Zarate MD    Written Informed Consent Obtained: Yes  Specimen Removed: YES port and catheter en bloc  Estimated Blood Loss: Minimal    Findings:   Successful removal of port and catheter en bloc.     Patient tolerated procedure well.      Sarita Zarate MD  Interventional Radiology

## 2024-01-04 NOTE — PLAN OF CARE
Jeremy Martinez has met all discharge criteria from Phase II. Vital Signs are stable, ambulating  without difficulty. Discharge instructions given, patient verbalized understanding. Discharged from facility via wheelchair in stable condition.

## 2024-01-04 NOTE — DISCHARGE SUMMARY
Radiology Discharge Summary      Hospital Course: No complications    Admit Date: 1/4/2024  Discharge Date: 01/04/2024     Instructions Given to Patient: Yes  Diet: Resume prior diet  Activity: activity as tolerated and no driving for today    Description of Condition on Discharge: Stable  Vital Signs (Most Recent): Temp: 97.4 °F (36.3 °C) (01/04/24 0815)  Pulse: 62 (01/04/24 0815)  Resp: 18 (01/04/24 0815)  BP: (!) 147/88 (01/04/24 0815)  SpO2: 100 % (01/04/24 0815)    Discharge Disposition: Home    Discharge Diagnosis: DLBCL     Follow-up: As needed    Sarita Zarate MD

## 2024-05-27 RX ORDER — AZELASTINE 1 MG/ML
1 SPRAY, METERED NASAL 2 TIMES DAILY
Qty: 90 ML | Refills: 1 | Status: SHIPPED | OUTPATIENT
Start: 2024-05-27 | End: 2025-05-27

## 2024-05-27 RX ORDER — AZELASTINE 1 MG/ML
1 SPRAY, METERED NASAL 2 TIMES DAILY
Qty: 30 ML | Refills: 3 | Status: CANCELLED | OUTPATIENT
Start: 2024-05-27 | End: 2025-05-27

## 2024-05-27 NOTE — TELEPHONE ENCOUNTER
Refill Routing Note   Medication(s) are not appropriate for processing by [facility] for the following reason(s):        No active prescription written by provider    [facility] action(s):  Defer             Appointments  past 12m or future 3m with PCP    Date Provider   Last Visit   9/5/2023 Jose Abbott MD   Next Visit   Visit date not found Jose Abbott MD   ED visits in past 90 days: 0        Note composed:8:33 AM 05/27/2024

## 2024-06-10 ENCOUNTER — PATIENT MESSAGE (OUTPATIENT)
Dept: INTERNAL MEDICINE | Facility: CLINIC | Age: 69
End: 2024-06-10
Payer: MEDICARE

## 2024-07-22 ENCOUNTER — PATIENT MESSAGE (OUTPATIENT)
Dept: INTERNAL MEDICINE | Facility: CLINIC | Age: 69
End: 2024-07-22
Payer: MEDICARE

## 2024-07-22 DIAGNOSIS — R73.9 HYPERGLYCEMIA: ICD-10-CM

## 2024-07-22 DIAGNOSIS — K21.9 GASTROESOPHAGEAL REFLUX DISEASE, UNSPECIFIED WHETHER ESOPHAGITIS PRESENT: ICD-10-CM

## 2024-07-22 DIAGNOSIS — I10 ESSENTIAL HYPERTENSION: Primary | ICD-10-CM

## 2024-07-22 DIAGNOSIS — C83.30 DIFFUSE LARGE B-CELL LYMPHOMA, UNSPECIFIED BODY REGION: ICD-10-CM

## 2024-07-22 DIAGNOSIS — E87.1 HYPONATREMIA: ICD-10-CM

## 2024-07-22 NOTE — TELEPHONE ENCOUNTER
Hi, please contact the patient to assist in scheduling    Orders Placed This Encounter    CBC Auto Differential    Comprehensive Metabolic Panel    Lipid Panel    Hemoglobin A1C   For prior to his upcoming appt with me:  Future Appointments   Date Time Provider Department Center   7/29/2024 10:40 AM Jose Abbott MD [facility]         Thank you, Jose Abbott

## 2024-07-29 ENCOUNTER — OFFICE VISIT (OUTPATIENT)
Dept: INTERNAL MEDICINE | Facility: CLINIC | Age: 69
End: 2024-07-29
Payer: MEDICARE

## 2024-07-29 VITALS
HEIGHT: 66 IN | SYSTOLIC BLOOD PRESSURE: 110 MMHG | OXYGEN SATURATION: 99 % | WEIGHT: 143.5 LBS | DIASTOLIC BLOOD PRESSURE: 62 MMHG | BODY MASS INDEX: 23.06 KG/M2 | HEART RATE: 56 BPM

## 2024-07-29 DIAGNOSIS — G62.0 DRUG-INDUCED POLYNEUROPATHY: ICD-10-CM

## 2024-07-29 DIAGNOSIS — I10 ESSENTIAL HYPERTENSION: Primary | ICD-10-CM

## 2024-07-29 DIAGNOSIS — E78.2 MIXED HYPERLIPIDEMIA: Chronic | ICD-10-CM

## 2024-07-29 PROCEDURE — 99999 PR PBB SHADOW E&M-EST. PATIENT-LVL III: CPT | Mod: PBBFAC,HCNC,, | Performed by: INTERNAL MEDICINE

## 2024-07-29 NOTE — PROGRESS NOTES
Subjective:       Patient ID: Jeremy Martinez is a 69 y.o. male.    Chief Complaint: Annual Exam    Here for annual exam    1 yr since last chemo and still remission. Still mild numbness on fingertips, not severe.       Review of Systems   Constitutional:  Negative for activity change and unexpected weight change.   HENT:  Negative for hearing loss, rhinorrhea and trouble swallowing.    Eyes:  Negative for discharge and visual disturbance.   Respiratory:  Negative for chest tightness and wheezing.    Cardiovascular:  Negative for chest pain and palpitations.   Gastrointestinal:  Negative for blood in stool, constipation, diarrhea and vomiting.   Endocrine: Negative for polydipsia and polyuria.   Genitourinary:  Negative for difficulty urinating, hematuria and urgency.   Musculoskeletal:  Negative for arthralgias, joint swelling and neck pain.   Neurological:  Negative for weakness and headaches.   Psychiatric/Behavioral:  Negative for confusion and dysphoric mood.            Objective:      Physical Exam  Vitals reviewed.   Constitutional:       General: He is not in acute distress.     Appearance: Normal appearance. He is well-developed. He is not ill-appearing, toxic-appearing or diaphoretic.   HENT:      Head: Normocephalic and atraumatic.   Eyes:      General: No scleral icterus.  Neck:      Thyroid: No thyromegaly.   Cardiovascular:      Rate and Rhythm: Normal rate and regular rhythm.      Heart sounds: Normal heart sounds. No murmur heard.     No friction rub. No gallop.   Pulmonary:      Effort: Pulmonary effort is normal. No respiratory distress.      Breath sounds: Normal breath sounds. No wheezing or rales.   Abdominal:      General: Bowel sounds are normal. There is no distension.      Palpations: Abdomen is soft. There is no mass.      Tenderness: There is no abdominal tenderness. There is no guarding or rebound.   Musculoskeletal:         General: Normal range of motion.      Cervical back: Normal range  "of motion.   Lymphadenopathy:      Cervical: No cervical adenopathy.   Skin:     Findings: No lesion.   Neurological:      Mental Status: He is alert and oriented to person, place, and time.   Psychiatric:         Mood and Affect: Mood normal.         Behavior: Behavior normal.         Thought Content: Thought content normal.         Assessment:       1. Essential hypertension    2. Mixed hyperlipidemia    3. Aortic atherosclerosis    4. Drug-induced polyneuropathy        Plan:       Jeremy Stevenson" was seen today for annual exam.    Diagnoses and all orders for this visit:    Essential hypertension  controlled    Mixed hyperlipidemia  -     Lipid Panel; Future  He would like to work on less fatty foods in his diet less fried foods, less eggs and recheck in 3 months.    Drug-induced polyneuropathy  Symptoms are not seer serious/severe and he does not feel a need for medicine at this time.    He is in complete remission from the lymphoma at this time.      Health Maintenance         Date Due Completion Date    RSV Vaccine (Age 60+ and Pregnant patients) (1 - 1-dose 60+ series) Never done ---    COVID-19 Vaccine (6 - 2023-24 season) 03/10/2024 11/10/2023    TETANUS VACCINE 10/10/2024 (Originally 3/1/1973) ---    Shingles Vaccine (1 of 2) 10/10/2024 (Originally [birth year]/1/1974) ---    Influenza Vaccine (1) 09/01/2024 10/2/2023    Colorectal Cancer Screening 08/01/2027 8/1/2017    Override on 6/4/2007: Done    Lipid Panel 07/23/2029 7/23/2024            Visit today included increased complexity associated with the care of the episodic problem  addressed and managing the longitudinal care of the patient due to the serious and/or complex managed problem(s) .    Follow up in about 1 year (around 7/29/2025) for Prevnar 20 vaccine, blood work in 3 months.    Future Appointments   Date Time Provider Department Center   10/29/2024  7:05 AM LAB, SBP SBP LAB [facility]   7/29/2025  9:00 AM Jose Abbott MD [facility] "

## 2024-08-02 PROBLEM — I70.0 AORTIC ATHEROSCLEROSIS: Status: ACTIVE | Noted: 2024-08-02

## 2024-10-15 DIAGNOSIS — E87.1 HYPONATREMIA: ICD-10-CM

## 2024-10-15 DIAGNOSIS — I10 ESSENTIAL HYPERTENSION: ICD-10-CM

## 2024-10-15 RX ORDER — LISINOPRIL 40 MG/1
40 TABLET ORAL
Qty: 90 TABLET | Refills: 3 | Status: SHIPPED | OUTPATIENT
Start: 2024-10-15

## 2024-10-15 NOTE — TELEPHONE ENCOUNTER
Refill Decision Note   Jeremy Martinez  is requesting a refill authorization.  Brief Assessment and Rationale for Refill:  Approve     Medication Therapy Plan:         Pharmacist review requested: Yes   Extended chart review required: Yes   Comments:     Note composed:11:58 AM 10/15/2024

## 2024-10-15 NOTE — TELEPHONE ENCOUNTER
No care due was identified.  [facility] Embedded Care Due Messages. Reference number: [id number].   10/15/2024 8:13:00 AM CDT

## 2024-10-15 NOTE — TELEPHONE ENCOUNTER
Refill Routing Note   Medication(s) are not appropriate for processing by [facility] for the following reason(s):        Drug-disease interaction    [facility] action(s):  Defer      Medication Therapy Plan: Hyponatremia    Pharmacist review requested: Yes     Appointments  past 12m or future 3m with PCP    Date Provider   Last Visit   7/29/2024 Jose Abbott MD   Next Visit   Visit date not found Jose Abbott MD   ED visits in past 90 days: 0        Note composed:8:40 AM 10/15/2024

## 2024-12-10 RX ORDER — SILDENAFIL 100 MG/1
100 TABLET, FILM COATED ORAL
Qty: 12 TABLET | Refills: 2 | Status: SHIPPED | OUTPATIENT
Start: 2024-12-10

## 2024-12-10 NOTE — TELEPHONE ENCOUNTER
Refill Decision Note   Jeremy Martinez  is requesting a refill authorization.  Brief Assessment and Rationale for Refill:  Approve     Medication Therapy Plan:        Comments:     Note composed:12:29 AM 12/10/2024

## 2025-01-13 DIAGNOSIS — Z00.00 ENCOUNTER FOR MEDICARE ANNUAL WELLNESS EXAM: ICD-10-CM

## 2025-03-20 ENCOUNTER — OFFICE VISIT (OUTPATIENT)
Dept: INTERNAL MEDICINE | Facility: CLINIC | Age: 70
End: 2025-03-20
Payer: MEDICARE

## 2025-03-20 ENCOUNTER — TELEPHONE (OUTPATIENT)
Dept: HEMATOLOGY/ONCOLOGY | Facility: CLINIC | Age: 70
End: 2025-03-20
Payer: MEDICARE

## 2025-03-20 VITALS
HEIGHT: 66 IN | HEART RATE: 60 BPM | WEIGHT: 151.88 LBS | DIASTOLIC BLOOD PRESSURE: 80 MMHG | SYSTOLIC BLOOD PRESSURE: 124 MMHG | BODY MASS INDEX: 24.41 KG/M2 | OXYGEN SATURATION: 99 %

## 2025-03-20 DIAGNOSIS — I10 ESSENTIAL HYPERTENSION: ICD-10-CM

## 2025-03-20 DIAGNOSIS — G62.0 DRUG-INDUCED POLYNEUROPATHY: ICD-10-CM

## 2025-03-20 DIAGNOSIS — C83.30 DIFFUSE LARGE B-CELL LYMPHOMA, UNSPECIFIED BODY REGION: Primary | ICD-10-CM

## 2025-03-20 PROCEDURE — 3079F DIAST BP 80-89 MM HG: CPT | Mod: HCNC,CPTII,S$GLB, | Performed by: INTERNAL MEDICINE

## 2025-03-20 PROCEDURE — 4010F ACE/ARB THERAPY RXD/TAKEN: CPT | Mod: HCNC,CPTII,S$GLB, | Performed by: INTERNAL MEDICINE

## 2025-03-20 PROCEDURE — 3008F BODY MASS INDEX DOCD: CPT | Mod: HCNC,CPTII,S$GLB, | Performed by: INTERNAL MEDICINE

## 2025-03-20 PROCEDURE — 1159F MED LIST DOCD IN RCRD: CPT | Mod: HCNC,CPTII,S$GLB, | Performed by: INTERNAL MEDICINE

## 2025-03-20 PROCEDURE — 3074F SYST BP LT 130 MM HG: CPT | Mod: HCNC,CPTII,S$GLB, | Performed by: INTERNAL MEDICINE

## 2025-03-20 PROCEDURE — G2211 COMPLEX E/M VISIT ADD ON: HCPCS | Mod: HCNC,S$GLB,, | Performed by: INTERNAL MEDICINE

## 2025-03-20 PROCEDURE — 1126F AMNT PAIN NOTED NONE PRSNT: CPT | Mod: HCNC,CPTII,S$GLB, | Performed by: INTERNAL MEDICINE

## 2025-03-20 PROCEDURE — 3288F FALL RISK ASSESSMENT DOCD: CPT | Mod: HCNC,CPTII,S$GLB, | Performed by: INTERNAL MEDICINE

## 2025-03-20 PROCEDURE — 1160F RVW MEDS BY RX/DR IN RCRD: CPT | Mod: HCNC,CPTII,S$GLB, | Performed by: INTERNAL MEDICINE

## 2025-03-20 PROCEDURE — 99999 PR PBB SHADOW E&M-EST. PATIENT-LVL IV: CPT | Mod: PBBFAC,HCNC,, | Performed by: INTERNAL MEDICINE

## 2025-03-20 PROCEDURE — 99214 OFFICE O/P EST MOD 30 MIN: CPT | Mod: HCNC,S$GLB,, | Performed by: INTERNAL MEDICINE

## 2025-03-20 PROCEDURE — 1101F PT FALLS ASSESS-DOCD LE1/YR: CPT | Mod: HCNC,CPTII,S$GLB, | Performed by: INTERNAL MEDICINE

## 2025-03-20 NOTE — PATIENT INSTRUCTIONS
You are due for these vaccines:    Health Maintenance         Date Due Completion Date    TETANUS VACCINE Never done ---    Shingles Vaccine (1 of 2) Never done ---    RSV Vaccine (Age 60+ and Pregnant patients) (1 - Risk 60-74 years 1-dose series) Never done ---

## 2025-03-20 NOTE — PROGRESS NOTES
Subjective     Patient ID: Jeremy Martinez is a 70 y.o. male.    Chief Complaint: Annual Exam             History of Present Illness    CHIEF COMPLAINT:  Jeremy presents today for follow up    NEUROLOGICAL SYMPTOMS:  He experienced an episode of disorientation while on a cruise ship in January. During the 5-minute incident, he had difficulty controlling body movements despite maintaining mental awareness. He describes reaching for support but initially grasping at air before eventually making it to a wall, comparing the experience to feeling intoxicated. He denies any similar episodes since January.  Denied having vertigo at the time or seasickness.  Did not have any hearing loss or tinnitus during the episode as well.    MEDICAL HISTORY:  He has a history of lymphoma. During the cancer episode, he experienced difficulty eating, trouble swallowing, and problems with bowel movements. He reports improved but persistent numbness.    MEDICATIONS:  He takes blood pressure medication as prescribed and Viagra as needed. He occasionally uses OTC cold or sinus medication for symptomatic relief.    SOCIAL HISTORY:  He consumes 1-2 beers daily in the evening.    IMMUNIZATIONS:  He received pneumonia vaccine in July, and both COVID and flu vaccines on November 6th at [facility].      ROS:  Constitutional: negative activity change, negative unexpected weight change  HENT: negative trouble swallowing  Eyes: negative discharge, negative visual disturbance  Respiratory: negative chest tightness, negative wheezing, negative shortness of breath, +cough  Cardiovascular: negative chest pain, negative palpitations  Gastrointestinal: negative blood in stool, negative constipation, negative diarrhea, negative vomiting  Endocrine: negative polydipsia, negative polyuria  Genitourinary: negative difficulty urinating, negative dysuria, negative hematuria  Musculoskeletal: negative arthralgias, negative joint swelling, negative neck pain, +muscle  cramps  Neurological: negative weakness, negative headaches, +confusion or disorientation, +involuntary movements, +numbness  Psychiatric/Behavioral: negative confusion, negative dysphoric mood       Cuts grass and stay active with garden.    HPI  Review of Systems   Constitutional:  Negative for activity change and unexpected weight change.   HENT:  Negative for hearing loss, rhinorrhea and trouble swallowing.    Eyes:  Negative for discharge and visual disturbance.   Respiratory:  Negative for chest tightness and wheezing.    Cardiovascular:  Negative for chest pain and palpitations.   Gastrointestinal:  Negative for blood in stool, constipation, diarrhea and vomiting.   Endocrine: Negative for polydipsia and polyuria.   Genitourinary:  Negative for difficulty urinating, hematuria and urgency.   Musculoskeletal:  Negative for arthralgias, joint swelling and neck pain.   Neurological:  Negative for weakness and headaches.   Psychiatric/Behavioral:  Negative for confusion and dysphoric mood.         Objective     Physical Exam  Vitals reviewed.   Constitutional:       General: He is not in acute distress.     Appearance: Normal appearance. He is well-developed. He is not ill-appearing, toxic-appearing or diaphoretic.   HENT:      Head: Normocephalic and atraumatic.   Eyes:      General: No scleral icterus.  Neck:      Thyroid: No thyromegaly.   Cardiovascular:      Rate and Rhythm: Normal rate and regular rhythm.      Heart sounds: Normal heart sounds. No murmur heard.     No friction rub. No gallop.   Pulmonary:      Effort: Pulmonary effort is normal. No respiratory distress.      Breath sounds: Normal breath sounds. No wheezing or rales.   Abdominal:      General: Bowel sounds are normal. There is no distension.      Palpations: Abdomen is soft. There is no mass.      Tenderness: There is no abdominal tenderness. There is no guarding or rebound.   Musculoskeletal:         General: Normal range of motion.       Cervical back: Normal range of motion.   Lymphadenopathy:      Cervical: No cervical adenopathy.   Skin:     Findings: No lesion.   Neurological:      Mental Status: He is alert and oriented to person, place, and time.   Psychiatric:         Mood and Affect: Mood normal.         Behavior: Behavior normal.         Thought Content: Thought content normal.            Assessment and Plan     1. Diffuse large B-cell lymphoma, unspecified body region  -     Ambulatory referral/consult to Hematology / Oncology; Future; Expected date: 03/27/2025    2. Essential hypertension    3. Drug-induced polyneuropathy        Assessment & Plan    DIFFUSE LARGE B CELL LYMPHOMA:  - Monitored the patient's health status, noting no new health issues in the last year, except for an episode of disorientation on a cruise in January.  - Ongoing numbness persists but is less severe than before.  - Evaluated the patient's weight, observing an increase since cancer treatment.  - Blood counts reveal slight anemia.  - As it's been over a year since the patient's last oncology appointment, scheduled a follow-up with Dr. Sarkar.      - Educated the patient about RSV (RSV) and its potential for causing significant lung infections, similar to influenza and COVID-19.  - Discussed the availability of a new RSV vaccine and offered it to protect against respiratory infections.  - Monitored the patient's health status, noting no new issues in the last year.  - Auscultation of the lungs revealed clear findings.  He will consider vaccines    HYPERTENSION:  - Continue prescribed antihypertensive medication.  - Blood pressure is good and will be monitored regularly during visits.    HYPERLIPIDEMIA:  - Recent lab results from November show slightly elevated cholesterol but high HDL (good cholesterol) levels.  - Continue monitoring cholesterol levels through regular tests.    ERECTILE DYSFUNCTION:  - Continue Viagra as needed for erectile  dysfunction.    ANEMIA:  - Monitor blood counts through regular tests.    ?BRADYCARDIA:  - Heart rate initially low but this may have been erroneous, improved to normal during exam.  - Continue monitoring heart rate during visits.    PARESTHESIA:  - Recall previous discussion about potential recovery time.    DISORIENTATION:  - No spinning sensation or double vision during the episode.  - Assess as unexplained and consider potential causes.  He will call if symptoms recur.    ALCOHOL USE:  Stable      GENERAL HEALTH AND FOLLOW-UP:  - 1.  - Encourage patient to maintain active lifestyle with gardening and yard work.  - Recommend using a smartphone vannesa or device to track daily steps. 2.  - July lab results confirm no diabetes and normal kidney and liver function. 3.  - Update vaccinations: tetanus and shingles vaccines needed. 4.  - Follow up in 1 year, around patient's birthday.  - Cancel previously scheduled July appointment. 5.  - Jeremy to contact the office if any health issues arise before next scheduled appointment.                    Health Maintenance         Date Due Completion Date    TETANUS VACCINE Never done ---    Shingles Vaccine (1 of 2) Never done ---    RSV Vaccine (Age 60+ and Pregnant patients) (1 - Risk 60-74 years 1-dose series) Never done ---    Colorectal Cancer Screening 08/01/2027 8/1/2017    Override on 6/4/2007: Done    Lipid Panel 10/29/2029 10/29/2024          Patient Instructions   You are due for these vaccines:    Health Maintenance         Date Due Completion Date    TETANUS VACCINE Never done ---    Shingles Vaccine (1 of 2) Never done ---    RSV Vaccine (Age 60+ and Pregnant patients) (1 - Risk 60-74 years 1-dose series) Never done ---                Follow up in about 1 year (around 3/20/2026).    Visit today included increased complexity associated with the care of the episodic problem  addressed and managing the longitudinal care of the patient due to the serious and/or complex  managed problem(s) .    No future appointments.        This note was generated with the assistance of ambient listening technology. Verbal consent was obtained by the patient and accompanying visitor(s) for the recording of patient appointment to facilitate this note. I attest to having reviewed and edited the generated note for accuracy, though some syntax or spelling errors may persist. Please contact the author of this note for any clarification.

## 2025-03-20 NOTE — TELEPHONE ENCOUNTER
----- Message from RN Corinne sent at 3/20/2025 11:16 AM CDT -----  Regarding: FW: SCheduling    ----- Message -----  From: Lien Maher  Sent: 3/20/2025  11:13 AM CDT  To: [facility] Cancer Navigation  Subject: SCheduling                                       Please contact patient for an appointment with [facility].  Kaitlynn Gallegos

## 2025-03-20 NOTE — TELEPHONE ENCOUNTER
----- Message from RN Corinne sent at 3/20/2025 11:16 AM CDT -----  Regarding: FW: SCheduling    ----- Message -----  From: Lien Maher  Sent: 3/20/2025  11:13 AM CDT  To: [facility] Cancer Navigation  Subject: SCheduling                                       Please contact patient for an appointment with [facility].  Kaitlynn Gallegos

## 2025-03-21 ENCOUNTER — PATIENT MESSAGE (OUTPATIENT)
Dept: HEMATOLOGY/ONCOLOGY | Facility: CLINIC | Age: 70
End: 2025-03-21
Payer: MEDICARE

## 2025-03-21 ENCOUNTER — TELEPHONE (OUTPATIENT)
Dept: HEMATOLOGY/ONCOLOGY | Facility: CLINIC | Age: 70
End: 2025-03-21
Payer: MEDICARE

## 2025-03-21 NOTE — TELEPHONE ENCOUNTER
----- Message from RN Corinne sent at 3/20/2025 11:16 AM CDT -----  Regarding: FW: SCheduling    ----- Message -----  From: Lien Maher  Sent: 3/20/2025  11:13 AM CDT  To: [facility] Cancer Navigation  Subject: SCheduling                                       Please contact patient for an appointment with [facility].  Kaitlynn Gallegos

## 2025-04-09 ENCOUNTER — TELEPHONE (OUTPATIENT)
Dept: HEMATOLOGY/ONCOLOGY | Facility: CLINIC | Age: 70
End: 2025-04-09
Payer: MEDICARE

## 2025-04-09 NOTE — TELEPHONE ENCOUNTER
Called and spoke to Patient .  Appointment scheduled on 4/17/25 with Danny.  All questions and concerns addressed.   Provided my name and direct number and instructed Spouse to call with any questions and/or concerns.       BARRON OlivaresN, RN-BC  BMT Nurse Navigator  [facility] [address], [place], [place] [address]  _________________________  o [phone]

## 2025-04-09 NOTE — NURSING
Oncology Navigation   Intake  Cancer Type: Lymphoma  Type of Referral: Internal  Date of Referral: 03/20/25  Initial Nurse Navigator Contact: 03/20/25  Referral to Initial Contact Timeline (days): 0  First Appointment Available: 04/17/25  Appointment Date: 04/17/25  First Available Date vs. Scheduled Date (days): 0     Treatment                              Acuity      Follow Up  No follow-ups on file.

## 2025-04-14 ENCOUNTER — TELEPHONE (OUTPATIENT)
Dept: HEMATOLOGY/ONCOLOGY | Facility: CLINIC | Age: 70
End: 2025-04-14
Payer: MEDICARE

## 2025-04-15 ENCOUNTER — TELEPHONE (OUTPATIENT)
Dept: HEMATOLOGY/ONCOLOGY | Facility: CLINIC | Age: 70
End: 2025-04-15
Payer: MEDICARE

## 2025-04-15 NOTE — TELEPHONE ENCOUNTER
Called and spoke to Spouse.  Appointment rescheduled to 5/7 per Dr Delgado.  Needed pt slot for an urgent lymphoma pt.  All questions and concerns addressed.   Provided my name and direct number and instructed Spouse to call with any questions and/or concerns.       BARRON OlivaresN, RN-BC  BMT Nurse Navigator  [facility] [address], [place], [place] [address]  _________________________  o [phone]

## 2025-05-21 ENCOUNTER — LAB VISIT (OUTPATIENT)
Dept: LAB | Facility: HOSPITAL | Age: 70
End: 2025-05-21
Payer: MEDICARE

## 2025-05-21 ENCOUNTER — OFFICE VISIT (OUTPATIENT)
Dept: HEMATOLOGY/ONCOLOGY | Facility: CLINIC | Age: 70
End: 2025-05-21
Payer: MEDICARE

## 2025-05-21 VITALS
HEART RATE: 64 BPM | BODY MASS INDEX: 24.01 KG/M2 | OXYGEN SATURATION: 99 % | TEMPERATURE: 98 F | HEIGHT: 66 IN | RESPIRATION RATE: 16 BRPM | WEIGHT: 149.38 LBS

## 2025-05-21 DIAGNOSIS — C83.30 DIFFUSE LARGE B-CELL LYMPHOMA, UNSPECIFIED BODY REGION: ICD-10-CM

## 2025-05-21 DIAGNOSIS — C83.398 DIFFUSE LARGE B-CELL LYMPHOMA OF SOLID ORGAN EXCLUDING SPLEEN: ICD-10-CM

## 2025-05-21 LAB
ABSOLUTE EOSINOPHIL (OHS): 0.16 K/UL
ABSOLUTE MONOCYTE (OHS): 0.87 K/UL (ref 0.3–1)
ABSOLUTE NEUTROPHIL COUNT (OHS): 3.1 K/UL (ref 1.8–7.7)
ALBUMIN SERPL BCP-MCNC: 4 G/DL (ref 3.5–5.2)
ALP SERPL-CCNC: 76 UNIT/L (ref 40–150)
ALT SERPL W/O P-5'-P-CCNC: 12 UNIT/L (ref 10–44)
ANION GAP (OHS): 11 MMOL/L (ref 8–16)
AST SERPL-CCNC: 22 UNIT/L (ref 11–45)
BASOPHILS # BLD AUTO: 0.04 K/UL
BASOPHILS NFR BLD AUTO: 0.5 %
BILIRUB SERPL-MCNC: 0.6 MG/DL (ref 0.1–1)
BUN SERPL-MCNC: 13 MG/DL (ref 8–23)
CALCIUM SERPL-MCNC: 9.5 MG/DL (ref 8.7–10.5)
CHLORIDE SERPL-SCNC: 102 MMOL/L (ref 95–110)
CO2 SERPL-SCNC: 22 MMOL/L (ref 23–29)
CREAT SERPL-MCNC: 1 MG/DL (ref 0.5–1.4)
ERYTHROCYTE [DISTWIDTH] IN BLOOD BY AUTOMATED COUNT: 14.7 % (ref 11.5–14.5)
GFR SERPLBLD CREATININE-BSD FMLA CKD-EPI: >60 ML/MIN/1.73/M2
GLUCOSE SERPL-MCNC: 76 MG/DL (ref 70–110)
HCT VFR BLD AUTO: 36.4 % (ref 40–54)
HGB BLD-MCNC: 11.8 GM/DL (ref 14–18)
IMM GRANULOCYTES # BLD AUTO: 0.02 K/UL (ref 0–0.04)
IMM GRANULOCYTES NFR BLD AUTO: 0.3 % (ref 0–0.5)
LDH SERPL-CCNC: 226 U/L (ref 110–260)
LYMPHOCYTES # BLD AUTO: 3.27 K/UL (ref 1–4.8)
MCH RBC QN AUTO: 24.8 PG (ref 27–31)
MCHC RBC AUTO-ENTMCNC: 32.4 G/DL (ref 32–36)
MCV RBC AUTO: 77 FL (ref 82–98)
NUCLEATED RBC (/100WBC) (OHS): 0 /100 WBC
PLATELET # BLD AUTO: 236 K/UL (ref 150–450)
PMV BLD AUTO: 9.4 FL (ref 9.2–12.9)
POTASSIUM SERPL-SCNC: 5.2 MMOL/L (ref 3.5–5.1)
PROT SERPL-MCNC: 7.8 GM/DL (ref 6–8.4)
RBC # BLD AUTO: 4.76 M/UL (ref 4.6–6.2)
RELATIVE EOSINOPHIL (OHS): 2.1 %
RELATIVE LYMPHOCYTE (OHS): 43.8 % (ref 18–48)
RELATIVE MONOCYTE (OHS): 11.7 % (ref 4–15)
RELATIVE NEUTROPHIL (OHS): 41.6 % (ref 38–73)
SODIUM SERPL-SCNC: 135 MMOL/L (ref 136–145)
WBC # BLD AUTO: 7.46 K/UL (ref 3.9–12.7)

## 2025-05-21 PROCEDURE — 1101F PT FALLS ASSESS-DOCD LE1/YR: CPT | Mod: CPTII,HCNC,S$GLB, | Performed by: INTERNAL MEDICINE

## 2025-05-21 PROCEDURE — 83615 LACTATE (LD) (LDH) ENZYME: CPT | Mod: HCNC

## 2025-05-21 PROCEDURE — 36415 COLL VENOUS BLD VENIPUNCTURE: CPT | Mod: HCNC

## 2025-05-21 PROCEDURE — 99215 OFFICE O/P EST HI 40 MIN: CPT | Mod: HCNC,S$GLB,, | Performed by: INTERNAL MEDICINE

## 2025-05-21 PROCEDURE — 3008F BODY MASS INDEX DOCD: CPT | Mod: CPTII,HCNC,S$GLB, | Performed by: INTERNAL MEDICINE

## 2025-05-21 PROCEDURE — 3288F FALL RISK ASSESSMENT DOCD: CPT | Mod: CPTII,HCNC,S$GLB, | Performed by: INTERNAL MEDICINE

## 2025-05-21 PROCEDURE — 80053 COMPREHEN METABOLIC PANEL: CPT | Mod: HCNC

## 2025-05-21 PROCEDURE — 1159F MED LIST DOCD IN RCRD: CPT | Mod: CPTII,HCNC,S$GLB, | Performed by: INTERNAL MEDICINE

## 2025-05-21 PROCEDURE — 1126F AMNT PAIN NOTED NONE PRSNT: CPT | Mod: CPTII,HCNC,S$GLB, | Performed by: INTERNAL MEDICINE

## 2025-05-21 PROCEDURE — 99999 PR PBB SHADOW E&M-EST. PATIENT-LVL III: CPT | Mod: PBBFAC,HCNC,, | Performed by: INTERNAL MEDICINE

## 2025-05-21 PROCEDURE — 85025 COMPLETE CBC W/AUTO DIFF WBC: CPT | Mod: HCNC

## 2025-05-21 PROCEDURE — 4010F ACE/ARB THERAPY RXD/TAKEN: CPT | Mod: CPTII,HCNC,S$GLB, | Performed by: INTERNAL MEDICINE

## 2025-06-03 PROBLEM — C83.398 DIFFUSE LARGE B-CELL LYMPHOMA OF SOLID ORGAN EXCLUDING SPLEEN: Status: ACTIVE | Noted: 2023-03-08

## 2025-06-04 DIAGNOSIS — C83.398 DIFFUSE LARGE B-CELL LYMPHOMA OF SOLID ORGAN EXCLUDING SPLEEN: Primary | ICD-10-CM

## (undated) DEVICE — SUT 3-0 VICRYL / SH (J416)

## (undated) DEVICE — SCALPEL #15 BLADE STRL DISP.

## (undated) DEVICE — TRAY SUT REM SCISSOR FORCEP

## (undated) DEVICE — FORCEP ADSON SATIN WITH TEETH

## (undated) DEVICE — NDL HYPO REG 25G X 1 1/2

## (undated) DEVICE — ADHESIVE DERMABOND ADVANCED

## (undated) DEVICE — TRAY ANGIO BAPTIST

## (undated) DEVICE — STRIP MEDI WND CLSR 1/2X4IN

## (undated) DEVICE — KIT INTRO MICRO NIT VSI 4FR

## (undated) DEVICE — FLOWSWITCH 12/BX

## (undated) DEVICE — TRAY PORT IR PLACEMENT REMOVAL

## (undated) DEVICE — DRESSING MEPORE ISLAND 31/2X4

## (undated) DEVICE — Device

## (undated) DEVICE — KIT PROBE COVER WITH GEL